# Patient Record
Sex: FEMALE | Race: WHITE
[De-identification: names, ages, dates, MRNs, and addresses within clinical notes are randomized per-mention and may not be internally consistent; named-entity substitution may affect disease eponyms.]

---

## 2018-01-29 ENCOUNTER — HOSPITAL ENCOUNTER (INPATIENT)
Dept: HOSPITAL 68 - ERH | Age: 78
LOS: 11 days | Discharge: NURSING, REHAB OR LONG-TERM CARE | DRG: 436 | End: 2018-02-09
Attending: INTERNAL MEDICINE | Admitting: INTERNAL MEDICINE
Payer: COMMERCIAL

## 2018-01-29 VITALS — SYSTOLIC BLOOD PRESSURE: 170 MMHG | DIASTOLIC BLOOD PRESSURE: 60 MMHG

## 2018-01-29 VITALS — WEIGHT: 130 LBS | HEIGHT: 59 IN | BODY MASS INDEX: 26.21 KG/M2

## 2018-01-29 VITALS — SYSTOLIC BLOOD PRESSURE: 150 MMHG | DIASTOLIC BLOOD PRESSURE: 60 MMHG

## 2018-01-29 DIAGNOSIS — I12.9: ICD-10-CM

## 2018-01-29 DIAGNOSIS — I73.9: ICD-10-CM

## 2018-01-29 DIAGNOSIS — G89.29: ICD-10-CM

## 2018-01-29 DIAGNOSIS — Z85.42: ICD-10-CM

## 2018-01-29 DIAGNOSIS — Z95.1: ICD-10-CM

## 2018-01-29 DIAGNOSIS — M47.896: ICD-10-CM

## 2018-01-29 DIAGNOSIS — K43.9: ICD-10-CM

## 2018-01-29 DIAGNOSIS — R26.89: ICD-10-CM

## 2018-01-29 DIAGNOSIS — E78.5: ICD-10-CM

## 2018-01-29 DIAGNOSIS — C78.7: Primary | ICD-10-CM

## 2018-01-29 DIAGNOSIS — M48.061: ICD-10-CM

## 2018-01-29 DIAGNOSIS — Z87.891: ICD-10-CM

## 2018-01-29 DIAGNOSIS — K59.09: ICD-10-CM

## 2018-01-29 DIAGNOSIS — N18.4: ICD-10-CM

## 2018-01-29 DIAGNOSIS — I25.10: ICD-10-CM

## 2018-01-29 DIAGNOSIS — K21.9: ICD-10-CM

## 2018-01-29 DIAGNOSIS — R18.0: ICD-10-CM

## 2018-01-29 DIAGNOSIS — Z85.038: ICD-10-CM

## 2018-01-29 DIAGNOSIS — K76.6: ICD-10-CM

## 2018-01-29 LAB
ABSOLUTE GRANULOCYTE CT: 11.6 /CUMM (ref 1.4–6.5)
APTT BLD: 29 SEC (ref 25–37)
BASOPHILS # BLD: 0 /CUMM (ref 0–0.2)
BASOPHILS NFR BLD: 0 % (ref 0–2)
EOSINOPHIL # BLD: 0 /CUMM (ref 0–0.7)
EOSINOPHIL NFR BLD: 0.1 % (ref 0–5)
ERYTHROCYTE [DISTWIDTH] IN BLOOD BY AUTOMATED COUNT: 14.9 % (ref 11.5–14.5)
GRANULOCYTES NFR BLD: 96.7 % (ref 42.2–75.2)
HCT VFR BLD CALC: 32.2 % (ref 37–47)
LYMPHOCYTES # BLD: 0.3 /CUMM (ref 1.2–3.4)
MCH RBC QN AUTO: 31 PG (ref 27–31)
MCHC RBC AUTO-ENTMCNC: 33.6 G/DL (ref 33–37)
MCV RBC AUTO: 92.2 FL (ref 81–99)
MONOCYTES # BLD: 0.1 /CUMM (ref 0.1–0.6)
PLATELET # BLD: 309 /CUMM (ref 130–400)
PMV BLD AUTO: 7.2 FL (ref 7.4–10.4)
PROTHROMBIN TIME: 10.1 SEC (ref 9.4–12.5)
RED BLOOD CELL CT: 3.5 /CUMM (ref 4.2–5.4)
WBC # BLD AUTO: 12 /CUMM (ref 4.8–10.8)

## 2018-01-29 PROCEDURE — 2NASP: CPT

## 2018-01-29 PROCEDURE — 04007: CPT

## 2018-01-29 PROCEDURE — 83520 IMMUNOASSAY QUANT NOS NONAB: CPT

## 2018-01-29 PROCEDURE — P9047 ALBUMIN (HUMAN), 25%, 50ML: HCPCS

## 2018-01-29 PROCEDURE — 87075 CULTR BACTERIA EXCEPT BLOOD: CPT

## 2018-01-29 PROCEDURE — 83516 IMMUNOASSAY NONANTIBODY: CPT

## 2018-01-29 NOTE — ULTRASOUND REPORT
EXAMINATION:
US TRIPLEX LOWER EXTREMITY, RIGHT
 
CLINICAL INFORMATION:
Right leg swelling and pain.
 
COMPARISON:
Prior bilateral venous examination October 2017
 
TECHNIQUE:
Color-flow triplex imaging with spectral analysis and compression Doppler
were performed on the lower extremity.
 
FINDINGS:
Respiratory variation, normal compression and augmented flow are noted
throughout the lower extremity. The visualized common femoral vein,
superficial femoral vein, profunda femoral vein, popliteal vein and midcalf
peroneal and posterior tibial venous segments show no evidence of deep venous
thrombosis.
 
There is no Baker's cyst.
 
IMPRESSION:
Normal triplex scan without evidence of deep venous thrombosis involving the
lower extremity.

## 2018-01-29 NOTE — ED NECK/BACK PAIN COMPLAINT
History of Present Illness
 
General
Chief Complaint: Low Back Pain/Injury
Stated Complaint: BIBA BACK PAIN
Source: patient, old records, EMS
Exam Limitations: no limitations
 
Vital Signs & Intake/Output
Vital Signs & Intake/Output
 Vital Signs
 
 
Date Time Temp Pulse Resp B/P B/P Pulse O2 O2 Flow FiO2
 
     Mean Ox Delivery Rate 
 
 1417 98.8 80 20 120/77  94   
 
 1147 97.9 62 20 170/58  97 Room Air Room Air 
 
 0918 97.7 61 18 138/68     
 
 0918 97.7 61 18 138/68     
 
 0918 97.7 61 18 138/68     
 
 0918 97.7 61 18 138/68     
 
 0918 97.7 61 18 138/68     
 
 0707 97.7 61 18 138/68  96 Room Air  
 
 2250  70  170/70     
 
 2249  80  170/70     
 
 2239 98.2 70 18 150/60  97 Room Air  
 
 2024 98.7 69 20 170/60  97 Room Air  
 
 1917 98.4 68 20 186/84  95 Room Air  
 
 1717 98.1 71 18 191/84  96 Room Air  
 
 1716       Room Air  
 
 
 ED Intake and Output
 
 
  0000  1200
 
Intake Total 420 
 
Output Total  
 
Balance 420 
 
   
 
Intake, IV 10 
 
Intake, Oral 410 
 
Number 1 
 
Bowel  
 
Movements  
 
Patient 130 lb 
 
Weight  
 
Weight Reported by Patient 
 
Measurement  
 
Method  
 
 
 
Allergies
Coded Allergies:
Sulfa (Sulfonamide Antibiotics) (Severe, HIVES 16)
 
Reconcile Medications
Amlodipine Besylate (Norvasc) 10 MG TABLET   1 TAB PO DAILY htn  (Reported)
Aspirin (Children's Aspirin) 81 MG TAB.CHEW   1 TAB PO DAILY HEART  (Reported)
Atorvastatin Calcium (Lipitor) 20 MG TABLET   1 TAB PO AT BEDTIME HIGH 
CHOLESTROL  (Reported)
Cholecalciferol (Vitamin D3) (Vitamin D3) 2,000 UNIT CAPSULE   1 CAP PO BID 
SUPPLEMENT  (Reported)
Cilostazol 50 MG TAB   1 TAB PO BID CLOTTING  (Reported)
Clonidine (Catapres) 0.2 MG TABLET   1 TAB PO BID HEART  (Reported)
Hydralazine HCl 50 MG TABLET   1 TAB PO BID htn  (Reported)
Isosorbide Mononitrate (Imdur) 60 MG TER   1 TAB PO DAILY HEART  (Reported)
Metoprolol Succinate 50 MG TAB.ER.24H   1 TAB PO DAILY htn  (Reported)
Omega-3 Fatty Acids/Fish Oil (Fish Oil 1,000 MG Capsule) 340 MG-1,000 MG CAPSULE
  2 CAP PO DAILY SUPPLEMENT  (Reported)
Omeprazole 40 MG CAPSULE.   1 CAP PO DAILY gerd
 
Triage Note:
PT BIBA FROM HOME WITH CHRONIC BACK PAIN. STATES
 WHEN SHE IS NOT MOVING OR WALKING SHE HAS NO PAIN,
 BUT WHEN SHE IS MOVING IT IS 10/10 PAIN. DENIES
 ANY CP/SOB. STATES SHE HAS BEEN HAVING DIARRHEA
 RECENTLY AND RETAINING WATER AS WELL.
Triage Nurses Notes Reviewed? yes
Onset: Gradual
Duration: week(s):, intermittent, waxing and waning
Timing: recent history
Quality/Severity: moderate
Location: lumbar spine, paraspinous muscles
Radiation: buttocks
Method of Injury: unknown
Loss of Consciousness: no loss of consciousness
Modifying Factors: movement, rest
Associated Symptoms: abdominal bloating
HPI:
77 year old female with past medical history significant for CAD status post 
CABG (), PVD (status post angioplasty with stent placement), HTN,stage IV 
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 
disc surgery, uterine cancer, colon cancer, ? cva presents to the emergency room
complaining of exacerbation of her chronic left lower back pain has been going 
on intermittently for the past few weeks however is now brought her to the point
where she is unable to ambulate at home secondary to her back pain which 
intermittently radiates into her left thigh.  She denies any recent trauma or 
fall.  Patient is been taking tramadol without improvement.  She is also 
complaining of a several year history of abdominal bloating and diarrhea for 
which she said her primary care physician wants to get a CAT scan however she is
unable to [place] to get there.  She denies any nausea vomiting walk or bloody 
stools.  No urinary urgency frequency dysuria. she denies any pain at this time
 
 
(Guera CARABALLO,Shad)
 
Past History
 
Travel History
Traveled to [place] past 21 day No
 
Medical History
Any Pertinent Medical History? see below for history
Neurological: CVA (? L CVA w/o residual)
EENT: NONE
Cardiovascular: CAD, hypertension, hyperlipidemia, PVD, CABG /PAD with LE 
stent
Respiratory: NONE
Gastrointestinal: n & v x 1 month PTA 10/22/1990:  remote sigmoid resection for 
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
Hepatic: NONE
Renal: chronic kidney disease (stage IV)
Musculoskeletal: chronic back pain, degen joint disease, falls, gout
Psychiatric: NONE
Endocrine: thyroid disease
Blood Disorders: anemia
Cancer(s): basal cell carcinoma, colon/rectal cancer, endometrial cancer
GYN/Reproductive: NONE
History of MRSA: No
History of VRE: No
History of CDIFF: No
 
Surgical History
Surgical History: hysterectomy (TAHBSO for uterine Ca, f/b RT ), PARTIAL 
COLECTOMY (SIGMOID) 10/1990  DUE TO CANCER OF COLON
 
Psychosocial History
Who do you live with Patient/Self
Services at Home None
What is your primary language English
Tobacco Use: Never used
 
Family History
Family History, If Any:
MOTHER (Unknown - pt adolted).
FATHER (unknown - pt adopted).
 
Hx Contributory? No
(Shad Wayne)
 
Review of Systems
 
Review of Systems
Constitutional:
Reports: see HPI. 
Comments
Review of systems: See HPI, All other systems negative.
Constitutional, no chills no fever, no malaise 
HEENT:  no sore throat no congestion
Cardiovascular: No chest pain 
Skin:  no rashes, no change in skin
Respiratory: No dyspnea no cough no  sputum 
GI: No nausea no vomiting, CHRONIC diarrhea
: No dysuria No hematuria, no frequency
Muscle skeletal:  back pain, no neck pain,
Neurologic: , no headache
Psych: No stress
Heme/endocrine: No bruising 
Immunology: No lymphadenopathy
(Shad Wayne)
 
Physical Exam
 
Physical Exam
General Appearance: well developed/nourished, alert, awake
Neck: supple
Comments:
Well-developed well-nourished person in no acute distress
HEENT: Normal EENT exam; PERRL, EOMI, no nystagmus. HEAD is atraumatic. moist 
mucous membranes.  
Neck: Supple, normal range of motion without pain or tenderness
Back: Nontender, midline well healed incision without overlying erythema 
induration or tenderness,  no CVA tenderness. Full range of motion
Cardiovascular: Regular rate and rhythms no murmurs rubs 
Respiratory:  No respiratory distress.  Patient speaking in full complete 
sentences. Breath sounds clear to auscultation bilaterally: NO W/R/R
Abdomen: Soft, nontender distended, no appreciable organomegaly. Normal bowel 
sounds. No rebound/guarding,   (+)  ascites.
Extremity: No edema, neg slr b/l, full range of motion of extremities, normal 
and equal pulses bilaterally, 5 out of 5 strength noted to bilateral upper and 
lower extremities
Neuro: Alert oriented x3, motor sensory normal, There were no obvious focal 
neurologic abnormalities.
Skin: No appreciable rash on exposed skin, skin is warm and dry. no jaundice
Psych: Mood and affect is normal, memory and judgment is normal.
 
Core Measures
CVA/TIA Diagnosis: No
(Guera CARABALLO,Shad)
 
Progress
Differential Diagnosis: cauda equina syn, herniated disc, myofascial strain, 
pyelo/UTI, sciatica, spinal cord inj, T/L spine injury, ureterolithiasis, 
malignancy, ascites, sbp
Plan of Care:
 Orders
 
 
Procedure Date/time Status
 
Heart Healthy Diet  B Active
 
Change service to  1455 Active
 
CULTURE,BODY FLUID  1000 Active
 
CYTOLOGY SPECIMEN  1000 Active
 
BODY FLUID TOTAL PROTEIN  1000 Complete
 
BODY FLUID LDH  1000 Complete
 
BODY FLUID CELL COUNT  1000 Complete
 
BODY FLUID GLUCOSE  1000 Complete
 
BODY FLUID AMYLASE  1000 Complete
 
BODY FLUID ALBUMIN  1000 Complete
 
TOTAL PROTEIN  0815 Complete
 
LDH (LACT ACID DEHYDROGENASE)  0815 Complete
 
ALBUMIN  0815 Complete
 
CBC WITHOUT DIFFERENTIAL  0600 Complete
 
BASIC ELECTROLYTES PLUS BUN&CR  0600 Complete
 
PT EVAL LOW COMPLEX 20 MIN   UNK Complete
 
Gait  Training   UNK Complete
 
Lab Add-on Test   UNK Active
 
Vital Signs  2020 Active
 
Teach/Educate 2020 Active
 
Pain Treatment and Response 2020 Active
 
Nutritional Intake, Monitor 2020 Active
 
Isolation  2020 Active
 
Intake & Output 2020 Active
 
Patient Care Conference  2020 Active
 
Activity/Ambulation  2020 Active
 
PT Evaluate & Treat  180 Active
 
Saline Lock  180 Active
 
Pathway - chart  1807 Active
 
House Staff  1807 Active
 
Lab Add-on Test  1807 Active
 
Code Status  1807 Active
 
Patient Data  1728 Active
 
Add-on Test (ER Only)  1708 Active
 
ED Holding Orders  1706 Active
 
Admit to inpatient  1706 Active
 
Vital Signs  1706 Active
 
Code Status  1706 Complete
 
TROPONIN LEVEL  1540 Complete
 
PARTIAL THROMBOPLASTIN TIME  1540 Complete
 
PROTHROMBIN TIME  1540 Complete
 
PHOSPHORUS  1540 Complete
 
MAGNESIUM  1540 Complete
 
GAMMA GLUTAMYL TRANSFERASE  1540 Complete
 
Lab Add-on Test   UNK Active
 
VTE Mechanical Prophylaxis   UNK Active
 
Vital Signs   UNK Complete
 
Precautions   UNK Active
 
Nursing Misc   UNK Active
 
Intake & Output   UNK Active
 
MISSING MEDICATION FORM   UNK Active
 
 
 Current Medications
 
 
  Sig/Jose E Start time  Last
 
Medication Dose  Stop Time Status Admin
 
Prednisone 10 MG DAILY  1523 AC 
 
Amlodipine Besylate 10 MG DAILY  1000 AC 
 
(Norvasc)     0918
 
Isosorbide  60 MG DAILY  1000 AC 
 
Mononitrate     0918
 
(Imdur)     
 
Metoprolol Succinate 50 MG DAILY  1000 AC 
 
(Toprol Xl)     0918
 
Atorvastatin Calcium 20 MG AT BEDTIME  2200 AC 
 
(Lipitor)     2250
 
Cilostazol 50 MG BID  2200 AC 
 
(Pletal)     0300
 
Clonidine 0.2 MG BID  2200 AC 
 
(Catapres)     0918
 
Hydralazine HCl 50 MG BID  2200 AC 
 
(Apresoline)     0918
 
Melatonin 5 MG ONCE ONE  2045 CAN 
 
(Melatonin)    204  
 
Acetaminophen 500 MG Q6P PRN  1815 AC 
 
(Tylenol)     
 
Lidocaine 1 PAT DAILY  1815 AC 
 
(Lidoderm)     
 
Hydrocodone Bitart/ 1 TAB Q8P PRN  1800 AC 
 
Acetaminophen     
 
(Vicodin)     
 
Oxycodone/ 1 TAB Q8P PRN  1800 AC 
 
Acetaminophen     
 
(Percocet)     
 
 
 Laboratory Tests
 
 
 
18 1000:
Fluid WBC 72  H, Fld Mesothelial Cells 63, Fld Total RBCs Counted 128  H
 
18 1000:
Lymphocytes 14, % Normal PMNs 23, Fluid Glucose 102, Fluid Total Protein < 2.0, 
Fluid Albumin < 1.0, Fluid , Fluid Amylase < 30
 
18 0815:
Anion Gap 9, Estimated GFR 44  L, BUN/Creatinine Ratio 30.8  H, Lactate 
Dehydrogenase 509, Total Protein 4.5  L, Albumin 2.4  L, CBC w Diff NO MAN DIFF 
REQ, RBC 2.66  L, MCV 91.0, MCH 30.8, MCHC 33.8, RDW 14.9  H, MPV 7.8, Gran % 
87.2  H, Lymphocytes % 7.4  L, Monocytes % 5.3, Eosinophils % 0.1, Basophils % 0
, Absolute Granulocytes 9.5  H, Absolute Lymphocytes 0.8  L, Absolute Monocytes 
0.6, Absolute Eosinophils 0, Absolute Basophils 0
 Microbiology
 UNK  BODY FLUID: Body Fluid Culture - RES
 UNK  BODY FLUID: Gram Stain - RES
 
labs ordered, ct ordered-PT IS DECLINING ANYTHING FOR pain, 
 
case d/w dr mckeon agrees with plan
 
 
i d/w the pt all of her labs, pt is aware of her elevated kidney function,.l id/
w however her ct findings and need for workup. pt denies abd pain, fever, 
chills. 
 
case d/wdr mann will admit
Diagnostic Imaging:
Viewed by Me: CT Scan.  Discussed w/RAD: CT Scan. 
Radiology Impression: PATIENT: BROOK QUIROZ  MEDICAL RECORD NO: [id number] PRESENT
AGE: 77  PATIENT ACCOUNT NO: [id number] : 41  LOCATION: [facility] ([facility] ORDERING 
PHYSICIAN: Shad CARABALLO     SERVICE DATE:  EXAM TYPE: CAT - CT 
ABD & PELVIS W/O IV CONTRAS EXAMINATION: CT ABDOMEN AND PELVIS WITHOUT CONTRAST 
CLINICAL INFORMATION: Ascites. Lower back pain. Swelling. COMPARISON: Ultrasound
abdomen 2018. CT abdomen and pelvis 2016. TECHNIQUE: Multidetector 
volumetric imaging was performed from the superior aspect of the liver through 
the pubic symphysis. Sagittal and coronal reformatted images were obtained on 
the technologist's workstation. DLP: 386.18 mGy-cm FINDINGS: LUNG BASES: Small 
volume left pleural effusion. Linear atelectasis at the left lung base. Status 
post median sternotomy. Vascular calcification of the coronary arteries and 
thoracic aorta. LIVER, GALLBLADDER, AND BILIARY TREE: Large area of low 
attenuation consistent with a hepatic mass involving much of the midline right 
liver. Mass extends from the herminio hepatis to the dome measuring about 8.2 x 7.6
x 8 cm. This causes lobular bulging of the margin of the liver. On the CT scan 
of 2016, there was a hypodense lesion at the dome of the liver measuring 
2.2 cm. There is no intrahepatic bile duct dilatation. The gallbladder is 
unremarkable with no evidence of radiopaque gallstones, gallbladder wall 
thickening, or obvious pericholecystic inflammatory changes. PANCREAS: Pancreas 
is atrophic. No pancreatic duct dilatation or mass. SPLEEN: Unremarkable. 
ADRENAL GLANDS: Unremarkable. KIDNEYS AND URETERS: Bilateral renal cysts. There 
is a hyperdense cyst in the cortex of the upper pole right kidney measuring 1.8 
cm. Hyperdense cyst in the mid pole of the left kidney measuring 3.8 cm. BLADDER
: Small collection of air in the bladder. No Rodriguez catheter seen. Correlate with
history of bladder instrumentation. No bladder wall thickening. GASTROINTESTINAL
TRACT: Surgical line of sutures at the proximal sigmoid colon from prior partial
colectomy. No acute change of the bowel. No bowel obstruction. No bowel wall 
dilatation. There is a large collection of stool throughout the colon from cecum
through pelvis. The appendix is not seen. MESENTERY: There is a large volume of 
abdominal ascites. Surgical clips in the mesentery adjacent to the descending 
colon left side of abdomen. ABDOMINAL WALL: There is a midline ventral hernia at
the upper abdomen just below the diaphragm at the midline. The defect in the 
anterior abdominal wall measures 4.7 x 5.7 cm. There is a large pocket of 
abdominal ascites at this hernia site extending into the subcutaneous tissue. 
The herniated pocket measures 7.9 x 3.0 x 9.4 cm and bulges the anterior skin 
line at the upper anterior abdomen. There is generalized anasarca. LYMPH NODES: 
Normal. VASCULAR: Extensive atherosclerotic vascular wall calcifications 
throughout the abdomen and pelvis. No aneurysm of the aorta. PELVIC VISCERA: 
Uterus is absent. No adnexal abnormality. OSSEOUS STRUCTURES: Degenerative 
spondylosis of the spine with multilevel endplate spurring of the thoracic 
vertebrae. Degenerative spondylosis of the facet joints at the lower lumbar 
spine. Status post left-sided laminectomy L4 vertebra. Status post median 
sternotomy. No acute osseous abnormality. No focal bone lesion. IMPRESSION: 1. 
Large volume of abdominal ascites. Generalized anasarca. Small left pleural 
effusion. 2. Suspicious large lesion in the liver. This can be further 
characterized with dynamic MRI. 3. Degenerative changes of the spine. Status 
post left-sided laminectomy L4 vertebra. DICTATED BY: Stanton De La Torre MD  DATE/TIME 
DICTATED:18 :RAD.GARCIA  DATE/TIME TRANSCRIBED:1531 CONFIDENTIAL, DO NOT COPY WITHOUT APPROPRIATE AUTHORIZATION.  <
Electronically signed in Other Vendor System>                                   
                                                    SIGNED BY: Stanton De La Torre MD  [id number]
Initial ED EKG: nsr at 70, nonspecific st seg changes, normal axis
Prior EKG: unchanged (2016)
(Shad Wayne)
 
Departure
 
Departure
Time of Disposition: 
Disposition: STILL A PATIENT
Condition: Stable
Clinical Impression
Primary Impression: Ascites
Secondary Impressions: Gait instability, Intractable back pain, Liver lesion
Referrals:
Jairon PINTO,Adalberto CARLSON (PCP/Family)
 
Departure Forms:
Customer Survey
General Discharge Information
 
Admission Note
Spoke With:
Jerson Mann MD
Documentation of Exam:
Documentation of any treatments & extenuating circumstances including Concerns 
Regarding Discharge (functional status, medication knowledge or non-compliance, 
living conditions, etc.) that warrant an admission rather than observation: [ gi
and oncology consult, iv pain control, trend labs, brook will require 
paracentesis, pt consult, case management consult as she is unable to care for 
self currently at home alone
 
(Shad Wayne)
 
PA/NP Co-Sign Statement
Statement:
ED Attending supervision documentation-
 
[x] I saw and evaluated the patient. I have also reviewed all the pertinent lab 
results and diagnostic results. I agree with the findings and the plan of care 
as documented in the PA's/NP's documentation. 
 
[] I have reviewed the ED Record and agree with the PA's/NP's documentation.
 
[] Additions or exceptions (if any) to the PAs/NP's note and plan are 
summarized below:
[]
 
(Win Mckeon DO

## 2018-01-29 NOTE — ADMISSION CERTIFICATION
Admission Certification
 
Certification Statement
- As attending physician, I certify that at the time of
- admission, based on clinical presentation, severity of
- symptoms, need for further diagnostic testing and
- therapeutic interventions, and risk of adverse outcomes
- without in-hospital treatment, in my clinical assessment,
- this patient requires an acute hospital stay for a minimum
- of two nights or longer. I have also considered psychsocial
- factors such as support system, advanced age, financial
- issues, cognitive issues, and failed out-patient treatments,
- past re-admission history, safety of patient, and lack of
- compliance as applicable.
Specific rationale supporting this admission is:
Patient presents with severe back pain requiring IV meds, unable to ambulate, 
ascites ? malignancy. Needs diagnostic and therapeutic paracentesis, 
neurosurgical and PT consults.

## 2018-01-29 NOTE — CT SCAN REPORT
EXAMINATION:
CT ABDOMEN AND PELVIS WITHOUT CONTRAST
 
CLINICAL INFORMATION:
Ascites. Lower back pain. Swelling.
 
COMPARISON:
Ultrasound abdomen 01/18/2018. CT abdomen and pelvis 09/28/2016.
 
TECHNIQUE:
Multidetector volumetric imaging was performed from the superior aspect of
the liver through the pubic symphysis. Sagittal and coronal reformatted
images were obtained on the technologist's workstation.
 
DLP:
386.18 mGy-cm
 
FINDINGS:
 
LUNG BASES: Small volume left pleural effusion. Linear atelectasis at the
left lung base. Status post median sternotomy. Vascular calcification of the
coronary arteries and thoracic aorta.
 
LIVER, GALLBLADDER, AND BILIARY TREE: Large area of low attenuation
consistent with a hepatic mass involving much of the midline right liver.
Mass extends from the herminio hepatis to the dome measuring about 8.2 x 7.6 x 8
cm. This causes lobular bulging of the margin of the liver. On the CT scan of
09/28/2016, there was a hypodense lesion at the dome of the liver measuring
2.2 cm.
There is no intrahepatic bile duct dilatation.
 
The gallbladder is unremarkable with no evidence of radiopaque gallstones,
gallbladder wall thickening, or obvious pericholecystic inflammatory changes.
 
 
PANCREAS: Pancreas is atrophic. No pancreatic duct dilatation or mass.
 
SPLEEN: Unremarkable.
 
ADRENAL GLANDS: Unremarkable.
 
KIDNEYS AND URETERS: Bilateral renal cysts. There is a hyperdense cyst in the
cortex of the upper pole right kidney measuring 1.8 cm. Hyperdense cyst in
the mid pole of the left kidney measuring 3.8 cm.
 
BLADDER: Small collection of air in the bladder. No Rodriguez catheter seen.
Correlate with history of bladder instrumentation. No bladder wall
thickening.
 
GASTROINTESTINAL TRACT: Surgical line of sutures at the proximal sigmoid
colon from prior partial colectomy. No acute change of the bowel. No bowel
obstruction. No bowel wall dilatation. There is a large collection of stool
throughout the colon from cecum through pelvis.
The appendix is not seen.
 
MESENTERY: There is a large volume of abdominal ascites. Surgical clips in
the mesentery adjacent to the descending colon left side of abdomen.
 
ABDOMINAL WALL: There is a midline ventral hernia at the upper abdomen just
below the diaphragm at the midline. The defect in the anterior abdominal wall
measures 4.7 x 5.7 cm. There is a large pocket of abdominal ascites at this
hernia site extending into the subcutaneous tissue. The herniated pocket
measures 7.9 x 3.0 x 9.4 cm and bulges the anterior skin line at the upper
anterior abdomen.
There is generalized anasarca.
 
LYMPH NODES: Normal.
 
VASCULAR: Extensive atherosclerotic vascular wall calcifications throughout
the abdomen and pelvis. No aneurysm of the aorta.
 
PELVIC VISCERA: Uterus is absent. No adnexal abnormality.
 
OSSEOUS STRUCTURES: Degenerative spondylosis of the spine with multilevel
endplate spurring of the thoracic vertebrae. Degenerative spondylosis of the
facet joints at the lower lumbar spine. Status post left-sided laminectomy L4
vertebra.
Status post median sternotomy.
No acute osseous abnormality. No focal bone lesion.
 
IMPRESSION:
1. Large volume of abdominal ascites. Generalized anasarca. Small left
pleural effusion.
2. Suspicious large lesion in the liver. This can be further characterized
with dynamic MRI.
3. Degenerative changes of the spine. Status post left-sided laminectomy L4
vertebra.

## 2018-01-30 VITALS — DIASTOLIC BLOOD PRESSURE: 77 MMHG | SYSTOLIC BLOOD PRESSURE: 120 MMHG

## 2018-01-30 VITALS — DIASTOLIC BLOOD PRESSURE: 68 MMHG | SYSTOLIC BLOOD PRESSURE: 138 MMHG

## 2018-01-30 VITALS — SYSTOLIC BLOOD PRESSURE: 170 MMHG | DIASTOLIC BLOOD PRESSURE: 58 MMHG

## 2018-01-30 VITALS — SYSTOLIC BLOOD PRESSURE: 170 MMHG | DIASTOLIC BLOOD PRESSURE: 60 MMHG

## 2018-01-30 LAB
ABSOLUTE GRANULOCYTE CT: 9.5 /CUMM (ref 1.4–6.5)
BASOPHILS # BLD: 0 /CUMM (ref 0–0.2)
BASOPHILS NFR BLD: 0 % (ref 0–2)
EOSINOPHIL # BLD: 0 /CUMM (ref 0–0.7)
EOSINOPHIL NFR BLD: 0.1 % (ref 0–5)
ERYTHROCYTE [DISTWIDTH] IN BLOOD BY AUTOMATED COUNT: 14.9 % (ref 11.5–14.5)
GRANULOCYTES NFR BLD: 87.2 % (ref 42.2–75.2)
HCT VFR BLD CALC: 24.2 % (ref 37–47)
LYMPHOCYTES # BLD: 0.8 /CUMM (ref 1.2–3.4)
MCH RBC QN AUTO: 30.8 PG (ref 27–31)
MCHC RBC AUTO-ENTMCNC: 33.8 G/DL (ref 33–37)
MCV RBC AUTO: 91 FL (ref 81–99)
MONOCYTES # BLD: 0.6 /CUMM (ref 0.1–0.6)
PLATELET # BLD: 233 /CUMM (ref 130–400)
PMV BLD AUTO: 7.8 FL (ref 7.4–10.4)
RED BLOOD CELL CT: 2.66 /CUMM (ref 4.2–5.4)
WBC # BLD AUTO: 10.9 /CUMM (ref 4.8–10.8)

## 2018-01-30 PROCEDURE — 0W9G3ZZ DRAINAGE OF PERITONEAL CAVITY, PERCUTANEOUS APPROACH: ICD-10-PCS

## 2018-01-30 NOTE — PN- HOUSESTAFF
**See Addendum**
Subjective
Follow-up For:
weakness
back pain
liver mass
Subjective:
nonradicular back pain 5/10
no abdominal pain, diarrhea, incontinence, dysuria, nausea or vomiting
 
Review of Systems
Constitutional:
Reports: see HPI. 
 
Objective
Last 24 Hrs of Vital Signs/I&O
 Vital Signs
 
 
Date Time Temp Pulse Resp B/P B/P Pulse O2 O2 Flow FiO2
 
     Mean Ox Delivery Rate 
 
 1417 98.8 80 20 120/77  94   
 
 1147 97.9 62 20 170/58  97 Room Air Room Air 
 
 0918 97.7 61 18 138/68     
 
 0918 97.7 61 18 138/68     
 
 0918 97.7 61 18 138/68     
 
 0918 97.7 61 18 138/68     
 
 0918 97.7 61 18 138/68     
 
 0707 97.7 61 18 138/68  96 Room Air  
 
 2250  70  170/70     
 
 2249  80  170/70     
 
 2239 98.2 70 18 150/60  97 Room Air  
 
 2024 98.7 69 20 170/60  97 Room Air  
 
 1917 98.4 68 20 186/84  95 Room Air  
 
 1717 98.1 71 18 191/84  96 Room Air  
 
 1716       Room Air  
 
 
 Intake & Output
 
 
  1600  0800  0000
 
Intake Total 680 110 420
 
Output Total 150  
 
Balance 530 110 420
 
    
 
Intake, Blood 200  
 
Product   
 
Intake, IV  10 10
 
Intake, Oral 480 100 410
 
Number 0  1
 
Bowel   
 
Movements   
 
Output, Urine 150  
 
Patient   58.967 kg
 
Weight   
 
Weight   Reported by Patient
 
Measurement   
 
Method   
 
 
 
 
Physical Exam
General Appearance: Alert, Oriented X3, Cooperative, No Acute Distress
Cardiovascular: Regular Rate, Normal S1, Normal S2, No Murmurs
Lungs: Clear to Auscultation, Normal Air Movement
Abdomen: Normal Bowel Sounds, Soft, firm hepatomegaly nontender with ascites
Neurological: Strength at 5/5 X4 Ext (4/5 LLE), Cranial Nerves 3-12 NL
Extremities: No Clubbing, No Cyanosis, 1+ RLE edema
Current Medications:
 Current Medications
 
 
  Sig/Jose E Start time  Last
 
Medication Dose Route Stop Time Status Admin
 
Acetaminophen 500 MG Q6P PRN  1815 AC 
 
  PO   
 
Albumin Human 50 GM ONCE ONE  1245 DC 01/30
 
  IV  1246  1419
 
Amlodipine Besylate 10 MG DAILY  1000 AC 
 
  PO   0918
 
Atorvastatin Calcium 20 MG AT BEDTIME  2200 AC 
 
  PO   2250
 
Cilostazol 50 MG BID  2200 AC 
 
  PO   0300
 
Clonidine 0.2 MG BID  2200 AC 
 
  PO   0918
 
Heparin Sodium  5,000 UNIT Q8  2200 DC 
 
(Porcine)  SC  0800  
 
Hydralazine HCl 50 MG BID  220 AC 
 
  PO   0918
 
Hydrocodone Bitart/ 1 TAB Q8P PRN  1800 AC 
 
Acetaminophen  PO   
 
Isosorbide  60 MG DAILY  1000 AC 
 
Mononitrate  PO   0918
 
Lidocaine 1 ML .STK-MED ONE  1138 DC 
 
  ID  1139  
 
Lidocaine 1 PAT DAILY  1815 AC 
 
  EXT   
 
Melatonin 5 MG ONCE ONE  2045 CAN 
 
  PO  2046  
 
Metoprolol Succinate 50 MG DAILY  1000 AC 
 
  PO   0918
 
Oxycodone/ 1 TAB Q8P PRN  1800 AC 
 
Acetaminophen  PO   
 
Prednisone 10 MG DAILY  1523 AC 
 
  PO   
 
Tramadol HCl 50 MG ONCE ONE  2230 DC 
 
  PO  2231  2255
 
 
 
 
Last 24 Hrs of Lab/Lio Results
Last 24 Hrs of Labs/Mics:
 Laboratory Tests
 
18 1000:
Fluid WBC 72  H, Fld Mesothelial Cells 63, Fld Total RBCs Counted 128  H
 
18 1000:
Lymphocytes 14, % Normal PMNs 23, Fluid Glucose 102, Fluid Total Protein < 2.0, 
Fluid Albumin < 1.0, Fluid , Fluid Amylase < 30
 
18 0815:
Anion Gap 9, Estimated GFR 44  L, BUN/Creatinine Ratio 30.8  H, Lactate 
Dehydrogenase 509, Total Protein 4.5  L, Albumin 2.4  L, CBC w Diff NO MAN DIFF 
REQ, RBC 2.66  L, MCV 91.0, MCH 30.8, MCHC 33.8, RDW 14.9  H, MPV 7.8, Gran % 
87.2  H, Lymphocytes % 7.4  L, Monocytes % 5.3, Eosinophils % 0.1, Basophils % 0
, Absolute Granulocytes 9.5  H, Absolute Lymphocytes 0.8  L, Absolute Monocytes 
0.6, Absolute Eosinophils 0, Absolute Basophils 0
 Microbiology
 UNK  BODY FLUID: Body Fluid Culture - RES
 UNK  BODY FLUID: Gram Stain - RES
 
 
 
Assessment/Plan
Assessment:
77-year-old female with past medical history of CAD status post CABG (), PVD
(status post angioplasty with stent placement), HTN,stage IV chronic kidney 
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 
uterine cancer, colon cancer, ? cva admitted with chief complaint of back pain 
for 2 weeks which is associated with lower extremity weakness and frequent 
falls.
 
Chronic back pain: nonradicular, with new onset weakness, difficulty ambulating 
and falls 
 Patient reports diarrhea with fecal incontinence but normal bladder control
 L4 laminectomy in past (Dr. Rodriguez) and PT.
 CT showing neuroforaminal narrowing (no metastases or fractures) with spinal 
stenosis. 
        Continue analgesia with Dilaudid/oxycodone/Tylenol.
        Physical therapy consulted
        Neurosurgical consult for possible cord compression
 Patient refusing MRI evaluation
        STR.
 
Ascites with liver mass:
 CT reports large mass. 
 Patient refusing MRI at this time 
 Potential malignant ascites is of concern 
 History of colon cancer in past (resected ). 
 Paracentesis today, check SAAG, SBP, culture, and cytology
 GI consultation
 
RLE edema:
 Ultrasound Doppler negative for DVT. 
 
CAD/HTN:
 Continue betablocker,nitrates and antihypertensives
 
HLD:
 Statin on hold
 Trend LFTs
 
GERD: Continue omeprazole
 
CKD stage IIIA: stable
 Avoid nephrotoxins
 
Heart healthy diet
DVT ppx-heparin 5000 units subcutaneous Q8H
Full code
Problem List:
 1. Peripheral vascular occlusive disease
 
 2. CAD (coronary artery disease)
 
 3. Hypertension
 
 4. Abdominal pain
 
 5. History of colon cancer
 
 6. History of uterine cancer
 
 7. Falls frequently
 
 8. Ascites
 
 9. Liver lesion
 
Pain Ratin
Pain Location:
lower back
Pain Goal: Pain 4 or less
Pain Plan:
prn
Tomorrow's Labs & Rationales:
cbc, bep, lfts

## 2018-01-30 NOTE — CONS- GASTROENTEROLOGY
General Information and HPI
 
Consulting Request
Date of Consult: 01/30/18
Requested By:
Jerson Mann MD
 
Reason for Consult:
1.  Ascites
Source of Information: patient
History of Present Illness:
Patient is a 77 year old female with a PMH colon cancer s/p resectin in 1990 who
presents with new onset ascites and an enlarging mass in the dome of the liver. 
She has a past medical history of CAD status post CABG (2000), PVD (status post 
angioplasty with stent placement), HTN,stage IV chronic kidney disease secondary
to hypertensive nephrosclerosis HLD, lumbar disc surgery, and uterine cancer, ? 
cva admitted with chief complaint of back pain for 2 weeks which is associated 
with lower extremity weakness and frequent falls.  She describes the pain as 
dull aching and radiating down her lower extremities especially on the left 
side.  Of note the patient has history of back injury in due to falling down and
she has history of lumbar laminectomy.  albert Perez also reports diarrhea for 2 
months of associated with fecal incontinence.  She has had no melena nor bright 
red blood per rectum.  She denies nausea, vomiting or abdominal pain.  She has 
had no fever or shaking chills.  
 
On admission she had a CT Scan the results of which are as follows:
 
 
FINDINGS:
 
LUNG BASES: Small volume left pleural effusion. Linear atelectasis at the
left lung base. Status post median sternotomy. Vascular calcification of the
coronary arteries and thoracic aorta.
 
LIVER, GALLBLADDER, AND BILIARY TREE: Large area of low attenuation
consistent with a hepatic mass involving much of the midline right liver.
Mass extends from the herminio hepatis to the dome measuring about 8.2 x 7.6 x 8
cm. This causes lobular bulging of the margin of the liver. On the CT scan of
09/28/2016, there was a hypodense lesion at the dome of the liver measuring
2.2 cm.
There is no intrahepatic bile duct dilatation.
 
The gallbladder is unremarkable with no evidence of radiopaque gallstones,
gallbladder wall thickening, or obvious pericholecystic inflammatory changes.
 
 
PANCREAS: Pancreas is atrophic. No pancreatic duct dilatation or mass.
 
SPLEEN: Unremarkable.
 
ADRENAL GLANDS: Unremarkable.
 
KIDNEYS AND URETERS: Bilateral renal cysts. There is a hyperdense cyst in the
cortex of the upper pole right kidney measuring 1.8 cm. Hyperdense cyst in
the mid pole of the left kidney measuring 3.8 cm.
 
BLADDER: Small collection of air in the bladder. No Rodriguez catheter seen.
Correlate with history of bladder instrumentation. No bladder wall
thickening.
 
GASTROINTESTINAL TRACT: Surgical line of sutures at the proximal sigmoid
colon from prior partial colectomy. No acute change of the bowel. No bowel
obstruction. No bowel wall dilatation. There is a large collection of stool
throughout the colon from cecum through pelvis.
The appendix is not seen.
 
MESENTERY: There is a large volume of abdominal ascites. Surgical clips in
the mesentery adjacent to the descending colon left side of abdomen.
 
ABDOMINAL WALL: There is a midline ventral hernia at the upper abdomen just
below the diaphragm at the midline. The defect in the anterior abdominal wall
measures 4.7 x 5.7 cm. There is a large pocket of abdominal ascites at this
hernia site extending into the subcutaneous tissue. The herniated pocket
measures 7.9 x 3.0 x 9.4 cm and bulges the anterior skin line at the upper
anterior abdomen.
There is generalized anasarca.
 
LYMPH NODES: Normal.
 
VASCULAR: Extensive atherosclerotic vascular wall calcifications throughout
the abdomen and pelvis. No aneurysm of the aorta.
 
PELVIC VISCERA: Uterus is absent. No adnexal abnormality.
 
OSSEOUS STRUCTURES: Degenerative spondylosis of the spine with multilevel
endplate spurring of the thoracic vertebrae. Degenerative spondylosis of the
facet joints at the lower lumbar spine. Status post left-sided laminectomy L4
vertebra.
Status post median sternotomy.
No acute osseous abnormality. No focal bone lesion.
 
IMPRESSION:
1. Large volume of abdominal ascites. Generalized anasarca. Small left
pleural effusion.
2. Suspicious large lesion in the liver. This can be further characterized
with dynamic MRI.
3. Degenerative changes of the spine. Status post left-sided laminectomy L4
vertebra.
 
She underwent paracentesis and had a SAAG of 1.3 which is suggestive of portal 
hypertension.  She had WBC of 72 which argues against SBP.  Cytology is still 
pending.  Ascitic protein was <2 and LDH was 173 both of which argue again 
malignancy and cardiac ascites.  She also has chronic anemia which has been 
present for several years as well as chronic hypoalbuminemia with albumins in 
the 3.0 range.  However, both her platelets and coags (PT/INR) have been normal.
 
 
Allergies/Medications
Allergies:
Coded Allergies:
Sulfa (Sulfonamide Antibiotics) (Severe, HIVES 11/20/16)
 
Home Med List:
Amlodipine Besylate (Norvasc) 10 MG TABLET   1 TAB PO DAILY htn  (Reported)
Aspirin (Children's Aspirin) 81 MG TAB.CHEW   1 TAB PO DAILY HEART  (Reported)
Atorvastatin Calcium (Lipitor) 20 MG TABLET   1 TAB PO AT BEDTIME HIGH 
CHOLESTROL  (Reported)
Cholecalciferol (Vitamin D3) (Vitamin D3) 2,000 UNIT CAPSULE   1 CAP PO BID 
SUPPLEMENT  (Reported)
Cilostazol 50 MG TAB   1 TAB PO BID CLOTTING  (Reported)
Clonidine (Catapres) 0.2 MG TABLET   1 TAB PO BID HEART  (Reported)
Hydralazine HCl 50 MG TABLET   1 TAB PO BID htn  (Reported)
Isosorbide Mononitrate (Imdur) 60 MG TER   1 TAB PO DAILY HEART  (Reported)
Metoprolol Succinate 50 MG TAB.ER.24H   1 TAB PO DAILY htn  (Reported)
Omega-3 Fatty Acids/Fish Oil (Fish Oil 1,000 MG Capsule) 340 MG-1,000 MG CAPSULE
  2 CAP PO DAILY SUPPLEMENT  (Reported)
Omeprazole 40 MG CAPSULE.   1 CAP PO DAILY gerd
 
Current Medications:
 Current Medications
 
 
  Sig/Jose E Start time  Last
 
Medication Dose Route Stop Time Status Admin
 
Acetaminophen 500 MG Q6P PRN 01/29 1815 AC 
 
  PO   
 
Albumin Human 50 GM ONCE ONE 01/30 1245 DC 01/30
 
  IV 01/30 1246  1419
 
Amlodipine Besylate 10 MG DAILY 01/30 1000 AC 01/30
 
  PO   0918
 
Atorvastatin Calcium 20 MG AT BEDTIME 01/29 2200 AC 01/29
 
  PO   2250
 
Cilostazol 50 MG BID 01/29 2200 AC 01/30
 
  PO   0300
 
Clonidine 0.2 MG BID 01/29 2200 AC 01/30
 
  PO   0918
 
Heparin Sodium  5,000 UNIT Q8 01/29 2200 DC 
 
(Porcine)  SC 01/30 0800  
 
Hydralazine HCl 50 MG BID 01/29 2200 AC 01/30
 
  PO   0918
 
Hydrocodone Bitart/ 1 TAB Q8P PRN 01/29 1800 AC 
 
Acetaminophen  PO   
 
Isosorbide  60 MG DAILY 01/30 1000 AC 01/30
 
Mononitrate  PO   0918
 
Lidocaine 1 ML .STK-MED ONE 01/30 1138 DC 
 
  ID 01/30 1139  
 
Lidocaine 1 PAT DAILY 01/29 1815 AC 
 
  EXT   
 
Melatonin 5 MG ONCE ONE 01/29 2045 CAN 
 
  PO 01/29 2046  
 
Metoprolol Succinate 50 MG DAILY 01/30 1000 AC 01/30
 
  PO   0918
 
Oxycodone/ 1 TAB Q8P PRN 01/29 1800 AC 
 
Acetaminophen  PO   
 
Prednisone 10 MG DAILY 01/30 1523 UNVr 
 
  PO   
 
Tramadol HCl 50 MG ONCE ONE 01/29 2230 DC 01/29
 
  PO 01/29 2231  2255
 
 
 
 
Past History
 
Travel History
Traveled to [place] past 21 day No
 
Medical History
Blood Transfusion Hx: Yes
Neurological: CVA (? L CVA w/o residual)
EENT: NONE
Cardiovascular: CAD, hypertension, hyperlipidemia, PVD, CABG 2000/PAD with LE 
stent
Respiratory: NONE
Gastrointestinal: n & v x 1 month PTA 10/22/1990:  remote sigmoid resection for 
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
Hepatic: NONE
Renal: chronic kidney disease (stage IV)
Musculoskeletal: chronic back pain, degen joint disease, falls, gout
Psychiatric: NONE
Endocrine: thyroid disease
Blood Disorders: anemia
Cancer(s): basal cell carcinoma, colon/rectal cancer, endometrial cancer
GYN/Reproductive: NONE
 
Surgical History
Surgical History: hysterectomy (TAHBSO for uterine Ca, f/b RT ), PARTIAL 
COLECTOMY (SIGMOID) 10/1990  DUE TO CANCER OF COLON
 
Family History
Relations & Conditions If Any:
MOTHER (Unknown - pt adolted).
FATHER (unknown - pt adopted).
 
 
Psychosocial History
Who Do You Live With? self
Services at Home: None
Primary Language: English
Smoking Status: Former Smoker
Living Will? yes
Power of /HCP? yes
Name of POA/HCP: Clare Alston- friend/POA
 
Functional Ability
ADLs
Independent: dressing, eating, toileting, bathing. 
Ambulation: cane
IADLs
Independent: shopping, housework, finances, food prep, telephone, transportation
, medication admin. 
 
Review of Systems
 
Review of Systems
Constitutional:
Reports: malaise, weakness. 
EENTM:
Denies: no symptoms. 
Cardiovascular:
Denies: no symptoms. 
Respiratory:
Denies: no symptoms. 
GI:
Reports: see HPI, distention. 
Genitourinary:
Denies: no symptoms. 
Musculoskeletal:
Reports: see HPI, back pain. 
Skin:
Denies: no symptoms. 
Neurological/Psychological:
Denies: no symptoms. 
Hematologic/Endocrine:
Denies: no symptoms. 
Immunologic/Allergic:
Denies: no symptoms. 
 
Exam & Diagnostic Data
Vital Signs and I&O
Vital Signs
 
 
Date Time Temp Pulse Resp B/P B/P Pulse O2 O2 Flow FiO2
 
     Mean Ox Delivery Rate 
 
01/30 1417 98.8 80 20 120/77  94   
 
01/30 1147 97.9 62 20 170/58  97 Room Air Room Air 
 
01/30 0918 97.7 61 18 138/68     
 
01/30 0918 97.7 61 18 138/68     
 
01/30 0918 97.7 61 18 138/68     
 
01/30 0918 97.7 61 18 138/68     
 
01/30 0918 97.7 61 18 138/68     
 
01/30 0707 97.7 61 18 138/68  96 Room Air  
 
01/29 2250  70  170/70     
 
01/29 2249  80  170/70     
 
01/29 2239 98.2 70 18 150/60  97 Room Air  
 
01/29 2024 98.7 69 20 170/60  97 Room Air  
 
01/29 1917 98.4 68 20 186/84  95 Room Air  
 
01/29 1717 98.1 71 18 191/84  96 Room Air  
 
01/29 1716       Room Air  
 
 
 Intake & Output
 
 
 01/30 1600 01/30 0400 01/29 1600 01/29 0400 01/28 1600 01/28 0400
 
Intake Total 790 420    
 
Output Total 150     
 
Balance 640 420    
 
       
 
Intake, Blood 200     
 
Product      
 
Intake, IV 10 10    
 
Intake, Oral 580 410    
 
Number 0 1    
 
Bowel      
 
Movements      
 
Output, Urine 150     
 
Patient  130 lb    
 
Weight      
 
Weight  Reported by Patient    
 
Measurement      
 
Method      
 
 
 
 
Physical Exam
General Appearance: alert, awake, mild distress
Head: atraumatic, normal appearance
Neck: supple, full range of motion
Respiratory: normal breath sounds, lungs clear
Cardiovascular: regular rate/rhythm, Normal S1 and S2 without rub, murmur or 
gallop
Gastrointestinal: normal bowel sounds, soft, non-tender, no organomegaly
Extremities: edema, 2+, RLE
Neurologic/Psych: awake, alert, oriented x 3
Cranial Nerves: grossly intact
 
Results
Pertinent Lab Results:
 Laboratory Tests
 
 
 01/30 01/30 01/30
 
 UNK UNK 0815
 
Chemistry   
 
  Sodium (137 - 145 mmol/L)   138
 
  Potassium (3.5 - 5.1 mmol/L)   4.4
 
  Chloride (98 - 107 mmol/L)   106
 
  Carbon Dioxide (22 - 30 mmol/L)   23
 
  Anion Gap (5 - 16)   9
 
  BUN (7 - 17 mg/dL)   37  H
 
  Creatinine (0.5 - 1.0 mg/dL)   1.2  H
 
  Estimated GFR (>60 ml/min)   44  L
 
  BUN/Creatinine Ratio (7 - 25 %)   30.8  H
 
  Lactate Dehydrogenase (313 - 618 U/L)   509
 
  Total Protein (6.3 - 8.2 g/dL)   4.5  L
 
  Albumin (3.5 - 5.0 g/dL)   2.4  L
 
Hematology   
 
  CBC w Diff   NO MAN DIFF REQ
 
  WBC (4.8 - 10.8 /CUMM)   10.9  H
 
  RBC (4.20 - 5.40 /CUMM)   2.66  L
 
  Hgb (12.0 - 16.0 G/DL)   8.2  L
 
  Hct (37 - 47 %)   24.2  L
 
  MCV (81.0 - 99.0 FL)   91.0
 
  MCH (27.0 - 31.0 PG)   30.8
 
  MCHC (33.0 - 37.0 G/DL)   33.8
 
  RDW (11.5 - 14.5 %)   14.9  H
 
  Plt Count (130 - 400 /CUMM)   233
 
  MPV (7.4 - 10.4 FL)   7.8
 
  Gran % (42.2 - 75.2 %)   87.2  H
 
  Lymphocytes % (20.5 - 51.1 %)   7.4  L
 
  Monocytes % (1.7 - 9.3 %)   5.3
 
  Eosinophils % (0 - 5 %)   0.1
 
  Basophils % (0.0 - 2.0 %)   0
 
  Absolute Granulocytes (1.4 - 6.5 /CUMM)   9.5  H
 
  Absolute Lymphocytes (1.2 - 3.4 /CUMM)   0.8  L
 
  Lymphocytes (%)  14 
 
  Absolute Monocytes (0.10 - 0.60 /CUMM)   0.6
 
  Absolute Eosinophils (0.0 - 0.7 /CUMM)   0
 
  Absolute Basophils (0.0 - 0.2 /CUMM)   0
 
  % Normal PMNs (%)  23 
 
Other Body Source   
 
  Fluid WBC (0 - 5 /CUMM) 72  H  
 
  Fld Mesothelial Cells (%) 63  
 
  Fld Total RBCs Counted (0 /CUMM) 128  H  
 
  Fluid Glucose (mg/dL)  102 
 
  Fluid Total Protein (g/dL)  < 2.0 
 
  Fluid Albumin (g/dL)  < 1.0 
 
  Fluid LDH (U/L)  173 
 
  Fluid Amylase (U/L)  < 30 
 
 
 
 
 01/29
 
 1540
 
Chemistry 
 
  Sodium (137 - 145 mmol/L) 141
 
  Potassium (3.5 - 5.1 mmol/L) 4.9
 
  Chloride (98 - 107 mmol/L) 105
 
  Carbon Dioxide (22 - 30 mmol/L) 23
 
  Anion Gap (5 - 16) 13
 
  BUN (7 - 17 mg/dL) 37  H
 
  Creatinine (0.5 - 1.0 mg/dL) 1.3  H
 
  Estimated GFR (>60 ml/min) 40  L
 
  BUN/Creatinine Ratio (7 - 25 %) 28.5  H
 
  Glucose (65 - 99 mg/dL) 132  H
 
  Calcium (8.4 - 10.2 mg/dL) 10.0
 
  Phosphorus (2.5 - 4.5 mg/dL) 3.0
 
  Magnesium (1.6 - 2.3 mg/dL) 1.9
 
  Total Bilirubin (0.2 - 1.3 mg/dL) 0.6
 
  GGT (12 - 43 U/L) 159  H
 
  AST (14 - 36 U/L) 25
 
  ALT (9 - 52 U/L) 35
 
  Alkaline Phosphatase (<127 U/L) 214  H
 
  Troponin I (< 0.11 ng/ml) 0.02
 
  Total Protein (6.3 - 8.2 g/dL) 5.8  L
 
  Albumin (3.5 - 5.0 g/dL) 3.3  L
 
  Globulin (1.9 - 4.2 gm/dL) 2.5
 
  Albumin/Globulin Ratio (1.1 - 2.2 %) 1.3
 
Coagulation 
 
  PT (9.4 - 12.5 SEC) 10.1
 
  INR (0.90 - 1.19) 0.96
 
  APTT (25 - 37 SEC) 29
 
Hematology 
 
  CBC w Diff NO MAN DIFF REQ
 
  WBC (4.8 - 10.8 /CUMM) 12.0  H
 
  RBC (4.20 - 5.40 /CUMM) 3.50  L
 
  Hgb (12.0 - 16.0 G/DL) 10.8  L
 
  Hct (37 - 47 %) 32.2  L
 
  MCV (81.0 - 99.0 FL) 92.2
 
  MCH (27.0 - 31.0 PG) 31.0
 
  MCHC (33.0 - 37.0 G/DL) 33.6
 
  RDW (11.5 - 14.5 %) 14.9  H
 
  Plt Count (130 - 400 /CUMM) 309
 
  MPV (7.4 - 10.4 FL) 7.2  L
 
  Gran % (42.2 - 75.2 %) 96.7  H
 
  Lymphocytes % (20.5 - 51.1 %) 2.3  L
 
  Monocytes % (1.7 - 9.3 %) 0.9  L
 
  Eosinophils % (0 - 5 %) 0.1
 
  Basophils % (0.0 - 2.0 %) 0
 
  Absolute Granulocytes (1.4 - 6.5 /CUMM) 11.6  H
 
  Absolute Lymphocytes (1.2 - 3.4 /CUMM) 0.3  L
 
  Absolute Monocytes (0.10 - 0.60 /CUMM) 0.1
 
  Absolute Eosinophils (0.0 - 0.7 /CUMM) 0
 
  Absolute Basophils (0.0 - 0.2 /CUMM) 0
 
 
 
 
Assessment/Plan
Assessment/Recommendations:
ASSESSMENT:
1.  New onset ascites.  Question etiology.  SAG suggests portal hypertension.  
Given patient's history of coronary artery disease as well as peripheral 
vascular disease may have underlying metabolic syndrome and steatohepatitis/
cirrhosis related to NAFLD.  Cannot rule out malignant ascites but this appears 
less likely given low protein and LDH will need to await cytology.
2.  Lesion in right lobe of the liver question primary hepatocellular carcinoma 
versus metastatic disease given history of endometrial cancer as well as colon 
cancer.
3.  Back pain
4.  Chronic anemia of long duration
5.  Chronic constipation.  CT scan of the abdomen and pelvis shows large stool 
burden
 
RECOMMENDATIONS:
1.  Would check alpha-fetoprotein as well as CEA 
2.  Await cytology from ascites
3.  Improve bowel habit would give MiraLAX on a twice a day basis
4.  Would check iron studies, B12 and folate given anemia
6.  Would obtain triple phase MRI as hepatocellular carcinoma has a particular 
bright appearance which washes out and which can be elicited with a with and 
without contrast study.
7.  Would check AMA and ASMA as patient may have burned out autoimmune hepatitis
 
 
Consult Acknowledgment
- Thank you for your consult request.

## 2018-01-30 NOTE — ULTRASOUND REPORT
EXAMINATION:
PARACENTESIS
 
CLINICAL INFORMATION:
Ascites
 
COMPARISON:
CT abdomen pelvis 01/29/2018
 
TECHNIQUE:
Indirect ultrasound guidance using a 6 [place] Safe-T-Centesis closed
needle/catheter system
 
FINDINGS:
Informed consent was obtained from the patient prior to the procedure. During
this process, the procedure alternatives were explained, along with the
intended outcome and benefits. The risks of the procedure, as well as the
risk of not doing the procedure, was discussed. The patient was given the
opportunity to ask questions regarding the procedure and appeared competent
to make medical decisions. A signed consent form which documents this
discussion was placed in the medical record.
 
Ultrasound evaluation of the abdomen for ascites was performed. Moderate
amount of ascites is noted in the left lower quadrant. The site was marked. A
timeout procedure was performed. The area was prepped and draped in usual
sterile fashion.
 
Using standard interventional and sterile techniques, lidocaine was used to
anesthetize the region.  A 6 [place] Safe-T-Centesis closed needle/catheter
system was introduced into the left lower quadrant using standard safety
needle technique. Approximately 6.4 L of yellow fluid was removed into the
Vacutainer bottles. The catheter was then removed. Good hemostasis was
achieved.
 
The patient demonstrated immediate symptomatic relief. The patient tolerated
the procedure well. A sterile dressing was placed. The patient was discharged
from the department in stable condition.
 
COMPLICATIONS:
None.
 
IMPRESSION:
Successful ultrasound-guided paracentesis yielding 6.4 L of fluid.

## 2018-01-31 VITALS — DIASTOLIC BLOOD PRESSURE: 68 MMHG | SYSTOLIC BLOOD PRESSURE: 140 MMHG

## 2018-01-31 VITALS — SYSTOLIC BLOOD PRESSURE: 130 MMHG | DIASTOLIC BLOOD PRESSURE: 60 MMHG

## 2018-01-31 VITALS — SYSTOLIC BLOOD PRESSURE: 168 MMHG | DIASTOLIC BLOOD PRESSURE: 70 MMHG

## 2018-01-31 VITALS — DIASTOLIC BLOOD PRESSURE: 60 MMHG | SYSTOLIC BLOOD PRESSURE: 180 MMHG

## 2018-01-31 LAB
ABSOLUTE GRANULOCYTE CT: 11.5 /CUMM (ref 1.4–6.5)
BASOPHILS # BLD: 0 /CUMM (ref 0–0.2)
BASOPHILS NFR BLD: 0.1 % (ref 0–2)
EOSINOPHIL # BLD: 0 /CUMM (ref 0–0.7)
EOSINOPHIL NFR BLD: 0 % (ref 0–5)
ERYTHROCYTE [DISTWIDTH] IN BLOOD BY AUTOMATED COUNT: 15 % (ref 11.5–14.5)
GRANULOCYTES NFR BLD: 94 % (ref 42.2–75.2)
HCT VFR BLD CALC: 26.3 % (ref 37–47)
LYMPHOCYTES # BLD: 0.5 /CUMM (ref 1.2–3.4)
MCH RBC QN AUTO: 30.6 PG (ref 27–31)
MCHC RBC AUTO-ENTMCNC: 33.3 G/DL (ref 33–37)
MCV RBC AUTO: 92 FL (ref 81–99)
MONOCYTES # BLD: 0.2 /CUMM (ref 0.1–0.6)
PLATELET # BLD: 213 /CUMM (ref 130–400)
PMV BLD AUTO: 8.1 FL (ref 7.4–10.4)
RED BLOOD CELL CT: 2.86 /CUMM (ref 4.2–5.4)
WBC # BLD AUTO: 12.2 /CUMM (ref 4.8–10.8)

## 2018-01-31 NOTE — PN- HOUSESTAFF
Alla PINTO,Jamal 01/31/18 0710:
Subjective
Follow-up For:
Weakness and chronic back pain with difficulty ambulating
liver mass and ascites
Subjective:
patient had no complaints today
no complaints of abdominal pain
 
Review of Systems
Constitutional:
Reports: see HPI. 
 
Objective
Last 24 Hrs of Vital Signs/I&O
 Vital Signs
 
 
Date Time Temp Pulse Resp B/P B/P Pulse O2 O2 Flow FiO2
 
     Mean Ox Delivery Rate 
 
01/31 1422 97.2 90 19 140/68  94   
 
01/31 1224  76  168/70     
 
01/31 1128       Room Air Room Air 
 
01/31 0858  68  180/60     
 
01/31 0615 98.0 58 18 180/60  97 Room Air  
 
01/31 0607   20 180/64     
 
01/30 2228 99.1 74 20 170/60  95   
 
01/30 2126    170/86     
 
01/30 2126    170/86     
 
 
 Intake & Output
 
 
 01/31 1600 01/31 0800 01/31 0000
 
Intake Total 560  360
 
Output Total 250  
 
Balance 310  360
 
    
 
Intake, Oral 560  360
 
Output, Urine 250  
 
 
 
 
Physical Exam
General Appearance: Alert, Oriented X3, Cooperative, No Acute Distress
Cardiovascular: Regular Rate, Normal S1, Normal S2, No Murmurs
Lungs: Clear to Auscultation, Normal Air Movement
Abdomen: Normal Bowel Sounds, Soft, No Tenderness, No Masses, +hepatomegaly
Extremities: No Clubbing, No Cyanosis, Normal Pulses, trace RLE edema
Current Medications:
 Current Medications
 
 
  Sig/Jose E Start time  Last
 
Medication Dose Route Stop Time Status Admin
 
Acetaminophen 650 MG .STK-MED ONE 01/30 2129 DC 
 
  PO 01/30 2130  
 
Acetaminophen 500 MG Q6P PRN 01/29 1815 AC 01/30
 
  PO   2129
 
Amlodipine Besylate 10 MG DAILY 01/30 1000 AC 01/31
 
  PO   0607
 
Atorvastatin Calcium 20 MG AT BEDTIME 01/29 2200 AC 01/30
 
  PO   2126
 
Cilostazol 50 MG BID 01/29 2200 AC 01/31
 
  PO   0859
 
Clonidine 0.2 MG BID 01/29 2200 AC 01/31
 
  PO   0858
 
Hydralazine HCl 50 MG TID 01/31 1000 AC 01/31
 
  PO   0858
 
Hydralazine HCl 50 MG BID 01/29 2200 DC 01/30
 
  PO   2126
 
Hydrocodone Bitart/ 1 TAB Q8P PRN 01/29 1800 AC 
 
Acetaminophen  PO   
 
Isosorbide  60 MG DAILY 01/30 1000 AC 01/31
 
Mononitrate  PO   0858
 
Lidocaine 1 PAT DAILY 01/29 1815 AC 
 
  EXT   
 
Metoprolol Succinate 50 MG DAILY 01/30 1000 AC 01/31
 
  PO   0858
 
Oxycodone/ 1 TAB Q8P PRN 01/29 1800 AC 
 
Acetaminophen  PO   
 
Polyethylene Glycol 17 GM BID 01/31 1000 AC 01/31
 
  PO   0859
 
Prednisone 10 MG DAILY 01/30 1523 AC 01/31
 
  PO   0859
 
 
 
 
Last 24 Hrs of Lab/Lio Results
Last 24 Hrs of Labs/Mics:
 Laboratory Tests
 
01/31/18 0748:
Anion Gap 9, Estimated GFR 40  L, BUN/Creatinine Ratio 27.7  H, Iron 26  L, TIBC
153  L, Ferritin 151.0, Total Bilirubin 0.7, Direct Bilirubin 0.3, AST 32, ALT 
35, Alkaline Phosphatase 212  H, Total Protein 4.5  L, Albumin 2.6  L, Vitamin 
B12 796, Folate 4.2, CBC w Diff NO MAN DIFF REQ, RBC 2.86  L, MCV 92.0, MCH 30.6
, MCHC 33.3, RDW 15.0  H, MPV 8.1, Gran % 94.0  H, Lymphocytes % 4.4  L, 
Monocytes % 1.5  L, Eosinophils % 0, Basophils % 0.1, Absolute Granulocytes 11.5
 H, Absolute Lymphocytes 0.5  L, Absolute Monocytes 0.2, Absolute Eosinophils 0,
Absolute Basophils 0
 
 
Assessment/Plan
Assessment:
77-year-old female with past medical history of CAD status post CABG (2000), PVD
(status post angioplasty with stent placement), HTN,stage IV chronic kidney 
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 
uterine cancer, colon cancer was admitted acute on chronic back pain, associated
with lower extremity weakness, gait instability, and frequent falls.  The 
patient was found to have hepatomegaly and ascites on admission and underwent 
diagnostic and therapeutic paracentesis on this hospitalization.
 
Acute on chronic back pain: nonradicular, with new weakness, difficulty 
ambulating and falls 
 History of L4 laminectomy in past (Dr. Rodriguez) and PT.
 History of neuroforaminal narrowing with possible spinal stenosis. 
 Continue analgesia with Dilaudid/oxycodone/Tylenol.
 Physical therapy consulted, patient will likely need STR placement.
 Patient refusing MRI evaluation
       
Ascites with liver mass:
 CT reports large mass 
 Patient refusing MRI at this time 
 Potential malignant ascites is of concern 
 History of colon cancer in past (resected 1990). 
 Paracentesis today, high SAAG suggests portal hypertension, can not rule out 
malignant ascites
 GI consulted, appreciate recommendations
 Deferring work up to outpatient because patient refusing MRI because of 
claustrophobia and risk of gadolinium administration with renal insufficiency 
for recommended triple phase MRI
 
RLE edema:
 Ultrasound Doppler negative for DVT. 
 
CAD/HTN:
 Continue betablocker, nitrates and antihypertensives
 Patient was hypertensive today, hydralazine frequency increased to TID
 
HLD:
 Statin on hold
 Trend LFTs
 
GERD: Continue omeprazole
 
CKD stage IIIA: stable
 Avoid nephrotoxins
 
Heart healthy diet
DVT ppx-heparin 5000 units subcutaneous Q8H
Full code
Problem List:
 1. Peripheral vascular occlusive disease
 
 2. CAD (coronary artery disease)
 
 3. Hypertension
 
 4. History of uterine cancer
 
 5. Unsteady gait
 
 6. Ascites
 
 7. Liver lesion
 
 8. Intractable back pain
 
Pain Rating: 3
Pain Location:
lumbar 
Pain Goal: Pain 4 or less
Pain Plan:
prn
Tomorrow's Labs & Rationales:
none
 
 
James Camp MD 01/31/18 1220:
Attending MD Review Statement
 
Attending Statement
Attending MD Statement: examined this patient, discuss w/resident/PA/NP, agreed 
w/resident/PA/NP, reviewed EMR data (avail)
Attending Assessment/Plan:
Doing well today.  Back pain is improved, no neurological deficit, still weak 
and unsteady.  Paracentesis shows no signs of SBP, consistent with acute liver 
injury or cirrhosis.  Cytology sent.  Patient requires an MRI for workup of 
liver mass (cannot obtain triple-phase CT due to CKD), and this can be done as 
an outpatient.  Can be discharged to [facility] if bed is available, with outpatient 
liver MRI and GI follow up.

## 2018-01-31 NOTE — PN- GASTROENTEROLOGY
Assessment/Plan
Assessment/Recommendations:
ASSESSMENT:
1.  New onset ascites.  Await cytology.
2.  Lesion in right lobe of the liver question primary hepatocellular carcinoma 
versus metastatic disease given history of endometrial cancer as well as colon 
cancer.
3.  Back pain
4.  Chronic anemia of long duration
5.  Chronic constipation.  CT scan of the abdomen and pelvis shows large stool 
burden
 
RECOMMENDATIONS:
1.  Would check alpha-fetoprotein as well as CEA 
2.  Await cytology from ascites
3.  Improve bowel habit would give MiraLAX on a twice a day basis as CT abdomen 
showed increased stool burden
4.  Would check iron studies, B12 and folate given anemia
6.  Would obtain triple phase MRI as hepatocellular carcinoma has a particular 
bright appearance (i.e. hypervascular on the arterial phase of imaging) and 
display portal vein/delayed phase wash-out (or pseudocapsule enhancement).  
These findings can only be elicited with a with and without contrast study.  
Patient unable to tolerate closed MRI and awaiting study at open MRI.
7.  Would check AMA and ASMA as patient may have burned out autoimmune hepatitis
to account for portal hypertension. 
8.  GI will see again on Friday.  Awaiting results of above studies. 
 
 
Subjective
Subjective:
Patient somewhat fatigued.  No nausea, vomiting or abdominal pain.  Unable to 
tolerate close MRI due to claustrophobia.  No shortness of breath.  
 
Objective
Vital Signs and I&Os
Vital Signs
 
 
Date Time Temp Pulse Resp B/P B/P Pulse O2 O2 Flow FiO2
 
     Mean Ox Delivery Rate 
 
01/31 0858  68  180/60     
 
01/31 0615 98.0 58 18 180/60  97 Room Air  
 
01/31 0607   20 180/64     
 
01/30 2228 99.1 74 20 170/60  95   
 
01/30 2126    170/86     
 
01/30 2126    170/86     
 
01/30 1417 98.8 80 20 120/77  94   
 
01/30 1147 97.9 62 20 170/58  97 Room Air Room Air 
 
 
 Intake & Output
 
 
 01/31 1600 01/31 0400 01/30 1600 01/30 0400 01/29 1600 01/29 0400
 
Intake Total  360 790 420  
 
Output Total   150   
 
Balance  360 640 420  
 
       
 
Intake, Blood   200   
 
Product      
 
Intake, IV   10 10  
 
Intake, Oral  360 580 410  
 
Number   0 1  
 
Bowel      
 
Movements      
 
Output, Urine   150   
 
Patient    130 lb  
 
Weight      
 
Weight    Reported by Patient  
 
Measurement      
 
Method      
 
 
 
 
Physical Exam
General Appearance: alert, awake, comfortable
Respiratory: lungs clear
Cardiovascular: regular rate/rhythm, Normal S1 and S2, without rub, murmur or 
gallop
Abdomen: soft, non-tender, no organomegaly
Neurologic/Psychiatric: alert, oriented x 3, normal mood/affect
Current Medications:
 Current Medications
 
 
  Sig/Jose E Start time  Last
 
Medication Dose Route Stop Time Status Admin
 
Acetaminophen 650 MG .STK-MED ONE 01/30 2129 DC 
 
  PO 01/30 2130  
 
Acetaminophen 500 MG Q6P PRN 01/29 1815 AC 01/30
 
  PO   2129
 
Albumin Human 50 GM ONCE ONE 01/30 1245 DC 01/30
 
  IV 01/30 1246  1419
 
Amlodipine Besylate 10 MG DAILY 01/30 1000 AC 01/31
 
  PO   0607
 
Atorvastatin Calcium 20 MG AT BEDTIME 01/29 2200 AC 01/30
 
  PO   2126
 
Cilostazol 50 MG BID 01/29 2200 AC 01/31
 
  PO   0859
 
Clonidine 0.2 MG BID 01/29 2200 AC 01/31
 
  PO   0858
 
Hydralazine HCl 50 MG TID 01/31 1000 AC 01/31
 
  PO   0858
 
Hydralazine HCl 50 MG BID 01/29 2200 DC 01/30
 
  PO   2126
 
Hydrocodone Bitart/ 1 TAB Q8P PRN 01/29 1800 AC 
 
Acetaminophen  PO   
 
Isosorbide  60 MG DAILY 01/30 1000 AC 01/31
 
Mononitrate  PO   0858
 
Lidocaine 1 ML .STK-MED ONE 01/30 1138 DC 
 
  ID 01/30 1139  
 
Lidocaine 1 PAT DAILY 01/29 1815 AC 
 
  EXT   
 
Metoprolol Succinate 50 MG DAILY 01/30 1000 AC 01/31
 
  PO   0858
 
Oxycodone/ 1 TAB Q8P PRN 01/29 1800 AC 
 
Acetaminophen  PO   
 
Polyethylene Glycol 17 GM BID 01/31 1000 AC 01/31
 
  PO   0859
 
Prednisone 10 MG DAILY 01/30 1523 AC 01/31
 
  PO   0859
 
 
 
 
Results
Pertinent Lab Results:
 Laboratory Tests
 
 
 01/31 01/30 01/30 01/30
 
 0748 UNK UNK 0815
 
Chemistry    
 
  Sodium (137 - 145 mmol/L) 138   138
 
  Potassium (3.5 - 5.1 mmol/L) 4.5   4.4
 
  Chloride (98 - 107 mmol/L) 104   106
 
  Carbon Dioxide (22 - 30 mmol/L) 25   23
 
  Anion Gap (5 - 16) 9   9
 
  BUN (7 - 17 mg/dL) 36  H   37  H
 
  Creatinine (0.5 - 1.0 mg/dL) 1.3  H   1.2  H
 
  Estimated GFR (>60 ml/min) 40  L   44  L
 
  BUN/Creatinine Ratio (7 - 25 %) 27.7  H   30.8  H
 
  Iron (37 - 170 ug/dL) 26  L   
 
  TIBC (265 - 497 ug/dL) 153  L   
 
  Ferritin (11.1 - 264 ng/mL) Pending   
 
  Total Bilirubin (0.2 - 1.3 mg/dL) 0.7   
 
  Direct Bilirubin (< 0.4 mg/dL) 0.3   
 
  AST (14 - 36 U/L) 32   
 
  ALT (9 - 52 U/L) 35   
 
  Alkaline Phosphatase (<127 U/L) 212  H   
 
  Lactate Dehydrogenase (313 - 618 U/L)    509
 
  Total Protein (6.3 - 8.2 g/dL) 4.5  L   4.5  L
 
  Albumin (3.5 - 5.0 g/dL) 2.6  L   2.4  L
 
  Vitamin B12 (239 - 931 pg/mL) Pending   
 
  Folate (2.76 - 20.0 ng/mL) Pending   
 
Hematology    
 
  CBC w Diff Pending   NO MAN DIFF REQ
 
  WBC (4.8 - 10.8 /CUMM) Pending   10.9  H
 
  RBC (4.20 - 5.40 /CUMM) Pending   2.66  L
 
  Hgb (12.0 - 16.0 G/DL) Pending   8.2  L
 
  Hct (37 - 47 %) Pending   24.2  L
 
  MCV (81.0 - 99.0 FL) Pending   91.0
 
  MCH (27.0 - 31.0 PG) Pending   30.8
 
  MCHC (33.0 - 37.0 G/DL) Pending   33.8
 
  RDW (11.5 - 14.5 %) Pending   14.9  H
 
  Plt Count (130 - 400 /CUMM) Pending   233
 
  MPV (7.4 - 10.4 FL) Pending   7.8
 
  Gran % (42.2 - 75.2 %) Pending   87.2  H
 
  Lymphocytes % (20.5 - 51.1 %) Pending   7.4  L
 
  Monocytes % (1.7 - 9.3 %) Pending   5.3
 
  Eosinophils % (0 - 5 %) Pending   0.1
 
  Basophils % (0.0 - 2.0 %) Pending   0
 
  Absolute Granulocytes (1.4 - 6.5 /CUMM) Pending   9.5  H
 
  Absolute Lymphocytes (1.2 - 3.4 /CUMM) Pending   0.8  L
 
  Lymphocytes (%)   14 
 
  Absolute Monocytes (0.10 - 0.60 /CUMM) Pending   0.6
 
  Absolute Eosinophils (0.0 - 0.7 /CUMM) Pending   0
 
  Absolute Basophils (0.0 - 0.2 /CUMM) Pending   0
 
  % Normal PMNs (%)   23 
 
Other Body Source    
 
  Fluid WBC (0 - 5 /CUMM)  72  H  
 
  Fld Mesothelial Cells (%)  63  
 
  Fld Total RBCs Counted (0 /CUMM)  128  H  
 
  Fluid Glucose (mg/dL)   102 
 
  Fluid Total Protein (g/dL)   < 2.0 
 
  Fluid Albumin (g/dL)   < 1.0 
 
  Fluid LDH (U/L)   173 
 
  Fluid Amylase (U/L)   < 30 
 
 
 
 
 01/29 01/29
 
 1540 1506
 
Chemistry  
 
  Sodium (137 - 145 mmol/L) 141 
 
  Potassium (3.5 - 5.1 mmol/L) 4.9 
 
  Chloride (98 - 107 mmol/L) 105 
 
  Carbon Dioxide (22 - 30 mmol/L) 23 
 
  Anion Gap (5 - 16) 13 
 
  BUN (7 - 17 mg/dL) 37  H 
 
  Creatinine (0.5 - 1.0 mg/dL) 1.3  H 
 
  Estimated GFR (>60 ml/min) 40  L 
 
  BUN/Creatinine Ratio (7 - 25 %) 28.5  H 
 
  Glucose (65 - 99 mg/dL) 132  H 
 
  Calcium (8.4 - 10.2 mg/dL) 10.0 
 
  Phosphorus (2.5 - 4.5 mg/dL) 3.0 
 
  Magnesium (1.6 - 2.3 mg/dL) 1.9 
 
  Total Bilirubin (0.2 - 1.3 mg/dL) 0.6 
 
  GGT (12 - 43 U/L) 159  H 
 
  AST (14 - 36 U/L) 25 
 
  ALT (9 - 52 U/L) 35 
 
  Alkaline Phosphatase (<127 U/L) 214  H 
 
  Troponin I (< 0.11 ng/ml) 0.02 
 
  Total Protein (6.3 - 8.2 g/dL) 5.8  L 
 
  Albumin (3.5 - 5.0 g/dL) 3.3  L 
 
  Globulin (1.9 - 4.2 gm/dL) 2.5 
 
  Albumin/Globulin Ratio (1.1 - 2.2 %) 1.3 
 
Coagulation  
 
  PT (9.4 - 12.5 SEC) 10.1 
 
  INR (0.90 - 1.19) 0.96 
 
  APTT (25 - 37 SEC) 29 
 
Hematology  
 
  CBC w Diff NO MAN DIFF REQ 
 
  WBC (4.8 - 10.8 /CUMM) 12.0  H 
 
  RBC (4.20 - 5.40 /CUMM) 3.50  L 
 
  Hgb (12.0 - 16.0 G/DL) 10.8  L 
 
  Hct (37 - 47 %) 32.2  L 
 
  MCV (81.0 - 99.0 FL) 92.2 
 
  MCH (27.0 - 31.0 PG) 31.0 
 
  MCHC (33.0 - 37.0 G/DL) 33.6 
 
  RDW (11.5 - 14.5 %) 14.9  H 
 
  Plt Count (130 - 400 /CUMM) 309 
 
  MPV (7.4 - 10.4 FL) 7.2  L 
 
  Gran % (42.2 - 75.2 %) 96.7  H 
 
  Lymphocytes % (20.5 - 51.1 %) 2.3  L 
 
  Monocytes % (1.7 - 9.3 %) 0.9  L 
 
  Eosinophils % (0 - 5 %) 0.1 
 
  Basophils % (0.0 - 2.0 %) 0 
 
  Absolute Granulocytes (1.4 - 6.5 /CUMM) 11.6  H 
 
  Absolute Lymphocytes (1.2 - 3.4 /CUMM) 0.3  L 
 
  Absolute Monocytes (0.10 - 0.60 /CUMM) 0.1 
 
  Absolute Eosinophils (0.0 - 0.7 /CUMM) 0 
 
  Absolute Basophils (0.0 - 0.2 /CUMM) 0 
 
Urines  
 
  Urine Color  Cancelled
 
  Urine Clarity  Cancelled
 
  Urine pH  Cancelled
 
  Ur Specific Gravity  Cancelled
 
  Urine Protein  Cancelled
 
  Urine Ketones  Cancelled
 
  Urine Nitrite  Cancelled
 
  Urine Bilirubin  Cancelled
 
  Urine Urobilinogen  Cancelled
 
  Ur Leukocyte Esterase  Cancelled
 
  Ur Microscopic  Cancelled
 
  Urine Hemoglobin  Cancelled
 
  Urine Glucose  Cancelled

## 2018-02-01 VITALS — SYSTOLIC BLOOD PRESSURE: 170 MMHG | DIASTOLIC BLOOD PRESSURE: 64 MMHG

## 2018-02-01 VITALS — DIASTOLIC BLOOD PRESSURE: 68 MMHG | SYSTOLIC BLOOD PRESSURE: 186 MMHG

## 2018-02-01 VITALS — SYSTOLIC BLOOD PRESSURE: 110 MMHG | DIASTOLIC BLOOD PRESSURE: 65 MMHG

## 2018-02-01 LAB
ABSOLUTE GRANULOCYTE CT: 12.1 /CUMM (ref 1.4–6.5)
BASOPHILS # BLD: 0 /CUMM (ref 0–0.2)
BASOPHILS NFR BLD: 0.1 % (ref 0–2)
EOSINOPHIL # BLD: 0 /CUMM (ref 0–0.7)
EOSINOPHIL NFR BLD: 0.2 % (ref 0–5)
ERYTHROCYTE [DISTWIDTH] IN BLOOD BY AUTOMATED COUNT: 14.6 % (ref 11.5–14.5)
GRANULOCYTES NFR BLD: 89 % (ref 42.2–75.2)
HCT VFR BLD CALC: 24.9 % (ref 37–47)
LYMPHOCYTES # BLD: 0.8 /CUMM (ref 1.2–3.4)
MCH RBC QN AUTO: 30.8 PG (ref 27–31)
MCHC RBC AUTO-ENTMCNC: 33.7 G/DL (ref 33–37)
MCV RBC AUTO: 91.6 FL (ref 81–99)
MONOCYTES # BLD: 0.6 /CUMM (ref 0.1–0.6)
PLATELET # BLD: 218 /CUMM (ref 130–400)
PMV BLD AUTO: 8.1 FL (ref 7.4–10.4)
RED BLOOD CELL CT: 2.72 /CUMM (ref 4.2–5.4)
WBC # BLD AUTO: 13.6 /CUMM (ref 4.8–10.8)

## 2018-02-01 NOTE — PN- HOUSESTAFF
Alla PINTO,Jamal 18 0702:
Subjective
Follow-up For:
Back pain and lower extremity weakness
hepatomegaly and ascites
Subjective:
no complaints this morning
back pain improved, ambulating with PT
no abdominal pain, nausea, or vomiting
 
Review of Systems
Constitutional:
Reports: see HPI. 
 
Objective
Last 24 Hrs of Vital Signs/I&O
 Vital Signs
 
 
Date Time Temp Pulse Resp B/P B/P Pulse O2 O2 Flow FiO2
 
     Mean Ox Delivery Rate 
 
 0857  64  182/70     
 
 0800       Room Air  
 
 0648 98.7 66 18 170/64  97 Room Air  
 
 2330 98.3 80 18 130/60  97 Room Air  
 
 2045  70  140/70     
 
 2045  70  140/70     
 
 1639  70  132/80     
 
 
 Intake & Output
 
 
  1600  0800  0000
 
Intake Total 480 210 350
 
Output Total 300  
 
Balance 180 210 350
 
    
 
Intake, IV  10 
 
Intake, Oral 480 200 350
 
Output, Urine 300  
 
 
 
 
Physical Exam
General Appearance: Alert, Oriented X3, Cooperative, No Acute Distress
Cardiovascular: Regular Rate, Normal S1, Normal S2, No Murmurs
Lungs: Clear to Auscultation, Normal Air Movement
Abdomen: Normal Bowel Sounds, Soft, No Tenderness, distended +hepatomegaly, no 
tenderness on palpation
Extremities: No Clubbing, No Cyanosis, No Edema, Normal Pulses
Current Medications:
 Current Medications
 
 
  Sig/Jose E Start time  Last
 
Medication Dose Route Stop Time Status Admin
 
Acetaminophen 500 MG Q6P PRN  1815 AC 
 
  PO   2129
 
Amlodipine Besylate 10 MG DAILY  1000 AC 
 
  PO   0858
 
Atorvastatin Calcium 20 MG AT BEDTIME  2200 AC 
 
  PO   2045
 
Cilostazol 50 MG BID  2200 AC 
 
  PO   0858
 
Clonidine 0.2 MG BID  2200 AC 
 
  PO   0858
 
Hydralazine HCl 75 MG TID  1600 AC 
 
  PO   
 
Hydralazine HCl 50 MG TID  1000 DC 
 
  PO   0857
 
Hydrocodone Bitart/ 1 TAB Q8P PRN  1800 AC 
 
Acetaminophen  PO   
 
Isosorbide  60 MG DAILY  1000 AC 
 
Mononitrate  PO   0858
 
Lidocaine 1 PAT DAILY  1815 AC 
 
  EXT   
 
Metoprolol Succinate 50 MG DAILY  1000 AC 
 
  PO   0858
 
Oxycodone/ 1 TAB Q8P PRN  1800 AC 
 
Acetaminophen  PO   0621
 
Polyethylene Glycol 17 GM BID  1000 AC 
 
  PO   0859
 
Prednisone 10 MG DAILY  1523 AC 
 
  PO   0858
 
Tramadol HCl 50 MG ONCE ONE  2300 DC 
 
  PO  2301  2258
 
 
 
 
Last 24 Hrs of Lab/Lio Results
Last 24 Hrs of Labs/Mics:
 Laboratory Tests
 
18 0920:
CBC w Diff NO MAN DIFF REQ, RBC 2.72  L, MCV 91.6, MCH 30.8, MCHC 33.7, RDW 14.6
 H, MPV 8.1, Gran % 89.0  H, Lymphocytes % 6.0  L, Monocytes % 4.7, Eosinophils 
% 0.2, Basophils % 0.1, Absolute Granulocytes 12.1  H, Absolute Lymphocytes 0.8 
L, Absolute Monocytes 0.6, Absolute Eosinophils 0, Absolute Basophils 0
 
18 0817:
Anion Gap 11, Estimated GFR 40  L, BUN/Creatinine Ratio 37.7  H
 
 
Assessment/Plan
Assessment:
77-year-old female with past medical history of CAD status post CABG (), PVD
(status post angioplasty with stent placement), HTN,stage IV chronic kidney 
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 
uterine cancer, colon cancer was admitted acute on chronic back pain, associated
with lower extremity weakness, gait instability, and frequent falls.  The 
patient was found to have hepatomegaly and ascites on admission and underwent 
diagnostic and therapeutic paracentesis on this hospitalization.
 
Acute on chronic back pain: nonradicular, with new weakness, difficulty 
ambulating and falls 
 History of L4 laminectomy in past (Dr. Rodriguez)
 History of neuroforaminal narrowing with possible spinal stenosis. 
 Continue analgesia with Dilaudid/oxycodone/Tylenol.
 Physical therapy consulted, patient will likely need STR placement.
       
Ascites with liver mass:
 CT reports enlarging liver mass 8cm x 8cm x 8cm compared to 2.2cm in 2016
 MRI abdomen to further evaluate liver mass tomorrow
 Potential malignant ascites is of concern 
 History of colon cancer in past (resected ). 
 Paracentesis today, high SAAG suggests portal hypertension, can not rule out 
malignant ascites
 GI consulted, appreciate recommendations
 Outpatient GI follow up for further testing
 
RLE edema:
 Ultrasound Doppler negative for DVT. 
 
CAD/HTN:
 Continue betablocker, nitrates and antihypertensives
 Patient was hypertensive today, hydralazine dose and frequency increased
 Patient is not currently on an ACEi?
 
HLD:
 Statin on hold
 Trend LFTs
 
GERD: Continue omeprazole
 
CKD stage IIIA: stable
 Avoid nephrotoxins
 
Heart healthy diet
DVT ppx-heparin 5000 units subcutaneous Q8H
Full code
Problem List:
 1. Peripheral vascular occlusive disease
 
 2. CAD (coronary artery disease)
 
 3. Hypertension
 
 4. Liver lesion
 
 5. Ascites
 
 6. Chronic renal insufficiency
 
 7. Unsteady gait
 
 8. Falls frequently
 
 9. History of colon cancer
 
 10. History of uterine cancer
 
Pain Ratin
Pain Location:
n/a
Pain Goal: Pain 4 or less
Pain Plan:
prn
Tomorrow's Labs & Rationales:
James Tejada MD 18 1333:
Attending MD Review Statement
 
Attending Statement
Attending MD Statement: examined this patient, discuss w/resident/PA/NP, agreed 
w/resident/PA/NP, reviewed EMR data (avail)
Attending Assessment/Plan:
Doing well today.  Back pain is improved, no neurological deficit, still weak 
and unsteady.  Paracentesis shows no signs of SBP, consistent with acute liver 
injury or cirrhosis.  Cytology sent.  Patient requires an MRI for workup of 
liver mass (cannot obtain triple-phase CT due to CKD), and this can be done as 
an outpatient.  Can be discharged to [facility] if bed is available, with outpatient 
liver MRI and GI follow up.

## 2018-02-02 VITALS — DIASTOLIC BLOOD PRESSURE: 62 MMHG | SYSTOLIC BLOOD PRESSURE: 110 MMHG

## 2018-02-02 VITALS — DIASTOLIC BLOOD PRESSURE: 60 MMHG | SYSTOLIC BLOOD PRESSURE: 180 MMHG

## 2018-02-02 VITALS — DIASTOLIC BLOOD PRESSURE: 68 MMHG | SYSTOLIC BLOOD PRESSURE: 120 MMHG

## 2018-02-02 NOTE — PN- HOUSESTAFF
Alla PINTO,Jamal 1817:
Subjective
Follow-up For:
back pain/lower extremity weakness
liver mass/hepatomegaly/ascites
Subjective:
ambulated with PT yesterday, pending STR can't do stairs at home
no abdominal pain, refused MRI for evaluation of liver mass this morning
back pain is controlled, had one dose of tramadol overnight
 
Review of Systems
Constitutional:
Reports: see HPI. 
 
Objective
Last 24 Hrs of Vital Signs/I&O
 Vital Signs
 
 
Date Time Temp Pulse Resp B/P B/P Pulse O2 O2 Flow FiO2
 
     Mean Ox Delivery Rate 
 
 1333    170/67     
 
 1128 98.6 61 16 120/68     
 
 1127 98.6 61 16 120/68     
 
 0600 98.6 61 16 120/68  97 Room Air  
 
 2300 98.0 64 18 186/68  98 Room Air  
 
 2207  65  114/60     
 
 2202  64  186/68     
 
 2201  64  186/68     
 
 1518 97.6 60 18 110/65  98   
 
 
 Intake & Output
 
 
  1600  0800  0000
 
Intake Total  400 500
 
Output Total   250
 
Balance  400 250
 
    
 
Intake, Oral  400 500
 
Output, Urine   250
 
 
 
 
Physical Exam
General Appearance: Alert, Oriented X3, Cooperative, No Acute Distress
Cardiovascular: Regular Rate, Normal S1, Normal S2, No Murmurs
Lungs: bibasilar crackles
Abdomen: Normal Bowel Sounds, Soft, distended but soft, nontender, hepatomegaly
Neurological: Strength at 5/5 X4 Ext
Extremities: No Clubbing, No Cyanosis, Normal Pulses, RLE 1+ edema, -dvt
Current Medications:
 Current Medications
 
 
  Sig/Jose E Start time  Last
 
Medication Dose Route Stop Time Status Admin
 
Acetaminophen 500 MG Q6P PRN  1815 AC 
 
  PO   9
 
Amlodipine Besylate 10 MG DAILY  1000 AC 
 
  PO   1128
 
Atorvastatin Calcium 20 MG AT BEDTIME 2200 AC 
 
  PO   2202
 
Cilostazol 50 MG BID 2200 AC 
 
  PO   1133
 
Clonidine 0.2 MG BID 2200 AC 
 
  PO   1127
 
Hydralazine HCl 75 MG TID  1600 AC 
 
  PO   1333
 
Hydrocodone Bitart/ 1 TAB Q8P PRN  1800 AC 
 
Acetaminophen  PO   
 
Isosorbide  60 MG DAILY  1000 AC 
 
Mononitrate  PO   1127
 
Lidocaine 1 PAT DAILY  1815 AC 
 
  EXT   1126
 
Lorazepam 2 MG ONE PRN  0730 DC 
 
  IV  1200  
 
Metoprolol Succinate 50 MG DAILY  1000 AC 
 
  PO   1127
 
Oxycodone/ 1 TAB Q8P PRN  1800 AC 
 
Acetaminophen  PO   0621
 
Polyethylene Glycol 17 GM BID  1000 AC 
 
  PO   1126
 
Prednisone 10 MG DAILY  1523 AC 
 
  PO   1127
 
Tramadol HCl 50 MG ONCE ONE 2200 DC 
 
  PO 
 
 
 
 
Assessment/Plan
Assessment:
77-year-old female with past medical history of CAD status post CABG (), PVD
(status post angioplasty with stent placement), HTN,stage IV chronic kidney 
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 
uterine cancer, colon cancer was admitted acute on chronic back pain, associated
with lower extremity weakness, gait instability, and frequent falls.  The 
patient was found to have hepatomegaly and ascites on admission and underwent 
diagnostic and therapeutic paracentesis on this hospitalization.
 
Acute on chronic back pain: nonradicular, with new weakness, difficulty 
ambulating and falls 
 History of L4 laminectomy in past (Dr. Rodriguez)
 History of neuroforaminal narrowing with possible spinal stenosis. 
 Continue analgesia with Dilaudid/oxycodone/Tylenol.
 Physical therapy consulted, patient will likely need STR placement.
       
Ascites with liver mass:
 CT reports enlarging liver mass 8cm x 8cm x 8cm compared to 2.2cm in 2016
 MRI abdomen to further evaluate liver mass tomorrow
 Likely malignancy, patient refused MRI today
 History of colon cancer in past (resected ). 
 High SAAG suggests portal hypertension, can not rule out malignant ascites
 Follow up cytology and alpha fetoprotein
 GI consulted, appreciate recommendations
 Outpatient GI follow up for further testing
 
CAD/HTN:
 Continue betablocker, nitrates and antihypertensives
 Patient was hypertensive today, hydralazine dose and frequency increased
 Patient is not currently on an ACEi?
 
HLD:
 Statin on hold
 Trend LFTs
 
GERD: Continue omeprazole
 
CKD stage IIIA: stable
 Avoid nephrotoxins
 
Heart healthy diet
DVT ppx-heparin 5000 units subcutaneous Q8H
Full code
Problem List:
 1. CAD (coronary artery disease)
 
 2. Peripheral vascular occlusive disease
 
 3. Ascites
 
 4. Liver lesion
 
 5. Unsteady gait
 
Pain Ratin
Pain Location:
lumbar
Pain Goal: Pain 4 or less
Pain Plan:
prn
Tomorrow's Labs & Rationales:
afp
 
 
Conrado PINTOJames 18 1242:
Attending MD Review Statement
 
Attending Statement
Attending MD Statement: examined this patient, discuss w/resident/PA/NP, agreed 
w/resident/PA/NP, reviewed EMR data (avail)
Attending Assessment/Plan:
77F PMH CAD status post CABG (), PVD (status post angioplasty with stent 
placement), HTN,stage IV chronic kidney disease secondary to hypertensive 
nephrosclerosis, HLD, lumbar disc surgery, uterine cancer, colon cancer admitted
with acute onset of lower back pain with lower extremity weakness, as her legs 
just gave out.  Happens to patient about once a year, improved during admission 
with physical therapy and pain control.  Was found incidentally to have ascites 
now s/p paracentesis removing 6L, with ascitic fluid showing no evidence of SBP 
and consistent with cirrhosis/hepatitis.  CT of the abdomen shows large mass and
requires MRI, but patient is claustrophobic and will have outpatient open MRI.
 
Doing well today.  Awaiting insurance authorization for STR placement.  BP 
improved after increasing Hydralazine dose yesterday.
 
1. Ascites, very likely to be malignant given hepatic mass and history of 
multiple malignancies
2. Acute on chronic lower back pain
3. Bilateral lower extremity weakness
 
Plan
- Stable for discharge to STR
- Continue home medications
- Increase Hydralazine to 75mg TID on discharge
- Continue home Prednisone
- Outpatient GI follow up and open MRI liver

## 2018-02-02 NOTE — PATIENT DISCHARGE INSTRUCTIONS
Discharge Instructions
 
General Discharge Information
You were seen/treated for:
hepatomegaly/ascites/liver mass
back pain
Special Instructions:
Please follow up with GI, Dr. Jolly to evaluate your liver.
Please follow-up with your PCP within one week of discharge to check creatinine 
and potassium numbers
 
Acute Coronary Syndrome
 
Inclusion Criteria
At DC or during hospital stay patient has or had the following:
ACS DIAGNOSIS No
 
Discharge Core Measures
Meds if any: Prescribed or Continued at Discharge
Meds if any: NOT Prescribed or Continued at Discharge
 
Congestive Heart Failure
 
Inclusion Criteria
At DC or during hospital stay patient has or had the following:
CHF DIAGNOSIS No
 
Discharge Core Measures
Meds if any: Prescribed or Continued at Discharge
Meds if any: NOT Prescribed or Continued at Discharge
 
Cerebrovascular accident
 
Inclusion Criteria
At DC or during hospital stay patient has or had the following:
CVA/TIA Diagnosis No
 
Discharge Core Measures
Meds if any: Prescribed or Continued at Discharge
Meds if any: NOT Prescribed or Continued at Discharge
 
Venous thromboembolism
 
Inclusion Criteria
VTE Diagnosis No
VTE Type NONE
VTE Confirmed by (Test) NONE
 
Discharge Core Measures
- Per Current guidelines, there needs to be overlap
- treatment for the first 5 days of Warfarin therapy.
- If discharged on Warfarin prior to 5 days of
- overlap therapy, the patient will need to be
- assessed for post discharge needs including
- *Post discharge parental anticoagulation
- *Warfarin and/or parental anticoagulation education
- *Follow up date to check INR post discharge
At least 5 days overlap therapy as Inpatient No
Meds if any: Prescribed or Continued at Discharge
Note: Overlap Therapy is Warfarin and Anticoagulant
Meds if any: NOT Prescribed or Continued at Discharge

## 2018-02-02 NOTE — DISCHARGE SUMMARY
Visit Information
 
Visit Dates
Admission Date:
01/29/18
 
Discharge Date:
02/02/17
 
 
Hospital Course
 
Course
Attending Physician:
James Camp MD
 
Primary Care Physician:
Adalberto De La O MD
 
Hospital Course:
77-year-old female who presented to the ED with a chief complaint of 2 weeks of 
progressive back pain associated with lower extremity weakness, gait instability
and frequent falls.  She also reported increased abdominal girth.
 
PMH: of CAD s/p CABG (2000), PVD s/p angioplasty with stent placement, HTN, 
stage IV CKD secondary to hypertensive nephrosclerosis, HLD, lumbar disc surgery
, uterine cancer, colon cancer.
 
 Issues that was addressed during this admission:
 
#Acute on chronic back pain: 
The patient has history of L4 laminectomy in past (Dr. Rodriguez), History of 
neuroforaminal narrowing with possible spinal stenosis, and she reported recent 
fall.  CT showed: Degenerative spondylosis of the facet joints at the lower 
lumbar spine, without acute osseous abnormality. No focal bone lesion.  She was 
treated with pain medication as needed.  Physical therapy recommended STR 
placement.
       
#Ascites with liver mass(high suspicious of liver cancer)
 CT reports enlarging liver mass 8cm x 8cm x 8cm compared to 2.2cm in 2016.  
Ascites is SAAG was >1.1 and suggestive of portal hypertension however 
malignancy cannot be ruled out. GI consult recommended triphasic MRI to further 
address the liver mass.  The patient refused MRI because she is claustrophobic. 
Given that the patient has a history of cancer in the past we will instruct her 
to do the MRI as an outpatient (open MRI) and follow the GI and primary care 
doctor as soon as possible postdischarge.
 
 The patient alpha-fetoprotein was found to be negative. CEA, antimitochondrial 
antibody, and anti-smooth muscle antibody are all pending
 
#Chronic stable conditions includes CAD/HTN/HLD/GERD/CKD
 For this we continued her home dose of beta blocker, nitrates, 
antihypertensives and omeprazole. 
 
#For DVT prophylaxis we give heparin 5000 units subcutaneous was given at all 
time
Allergies:
Coded Allergies:
Sulfa (Sulfonamide Antibiotics) (Severe, HIVES 11/20/16)
 
Pertinent Lab Results:
SERVICE DATE: 01/29/-EXAM TYPE: CAT - CT ABD & PELVIS W/O IV CONTRAS
 
EXAMINATION:
CT ABDOMEN AND PELVIS WITHOUT CONTRAST
 
CLINICAL INFORMATION:
Ascites. Lower back pain. Swelling.
 
COMPARISON:
Ultrasound abdomen 01/18/2018. CT abdomen and pelvis 09/28/2016.
 
TECHNIQUE:
Multidetector volumetric imaging was performed from the superior aspect of
the liver through the pubic symphysis. Sagittal and coronal reformatted
images were obtained on the technologist's workstation.
 
DLP:
386.18 mGy-cm
 
FINDINGS:
 
LUNG BASES: Small volume left pleural effusion. Linear atelectasis at the
left lung base. Status post median sternotomy. Vascular calcification of the
coronary arteries and thoracic aorta.
 
LIVER, GALLBLADDER, AND BILIARY TREE: Large area of low attenuation
consistent with a hepatic mass involving much of the midline right liver.
Mass extends from the herminio hepatis to the dome measuring about 8.2 x 7.6 x 8
cm. This causes lobular bulging of the margin of the liver. On the CT scan of
09/28/2016, there was a hypodense lesion at the dome of the liver measuring
2.2 cm.
There is no intrahepatic bile duct dilatation.
 
The gallbladder is unremarkable with no evidence of radiopaque gallstones,
gallbladder wall thickening, or obvious pericholecystic inflammatory changes.
 
 
PANCREAS: Pancreas is atrophic. No pancreatic duct dilatation or mass.
 
SPLEEN: Unremarkable.
 
ADRENAL GLANDS: Unremarkable.
 
KIDNEYS AND URETERS: Bilateral renal cysts. There is a hyperdense cyst in the
cortex of the upper pole right kidney measuring 1.8 cm. Hyperdense cyst in
the mid pole of the left kidney measuring 3.8 cm.
 
BLADDER: Small collection of air in the bladder. No Rodriguez catheter seen.
Correlate with history of bladder instrumentation. No bladder wall
thickening.
 
GASTROINTESTINAL TRACT: Surgical line of sutures at the proximal sigmoid
colon from prior partial colectomy. No acute change of the bowel. No bowel
obstruction. No bowel wall dilatation. There is a large collection of stool
throughout the colon from cecum through pelvis.
The appendix is not seen.
 
MESENTERY: There is a large volume of abdominal ascites. Surgical clips in
the mesentery adjacent to the descending colon left side of abdomen.
 
ABDOMINAL WALL: There is a midline ventral hernia at the upper abdomen just
below the diaphragm at the midline. The defect in the anterior abdominal wall
measures 4.7 x 5.7 cm. There is a large pocket of abdominal ascites at this
hernia site extending into the subcutaneous tissue. The herniated pocket
measures 7.9 x 3.0 x 9.4 cm and bulges the anterior skin line at the upper
anterior abdomen.
There is generalized anasarca.
 
LYMPH NODES: Normal.
 
VASCULAR: Extensive atherosclerotic vascular wall calcifications throughout
the abdomen and pelvis. No aneurysm of the aorta.
 
PELVIC VISCERA: Uterus is absent. No adnexal abnormality.
 
OSSEOUS STRUCTURES: Degenerative spondylosis of the spine with multilevel
endplate spurring of the thoracic vertebrae. Degenerative spondylosis of the
facet joints at the lower lumbar spine. Status post left-sided laminectomy L4
vertebra.
Status post median sternotomy.
No acute osseous abnormality. No focal bone lesion.
 
IMPRESSION:
1. Large volume of abdominal ascites. Generalized anasarca. Small left
pleural effusion.
2. Suspicious large lesion in the liver. This can be further characterized
with dynamic MRI.
3. Degenerative changes of the spine. Status post left-sided laminectomy L4
vertebra.
 
Disposition Summary
 
Disposition
Principal Diagnosis:
Abdominal Ascites with liver mass thats highly suspicious for malignancy.
Additional Diagnosis:
Spinal stenosis
Discharge Disposition: SNF
 
Discharge Instructions
 
General Discharge Information
Code Status: Full Code
Patient's Diet:
Heart healthy diet
Patient's Activity:
As tolerated
Follow-Up Instructions/Appts:
-The patient will need to follow with her primary care doctor within 1 week.
 
-The patient has a mass in the liver that is suspicious for cancer, she will be 
instructed to remove and follow with GI as stated in the discharge instructions.
 This will need to be done as soon as possible postdischarge.
 
Medications at Discharge
Discharge Medications:
Continue taking these medications:
Cilostazol (Cilostazol) 50 MG TAB
    1 Tablet ORAL TWICE DAILY
    Qty = 60
    Comments:
       TAKE 1 TABLET BY MOUTH TWICE A DAY 
       START TONIGHT 7/15/14
 
Clonidine (Catapres) 0.2 MG TABLET
    1 Tablet ORAL TWICE DAILY
 
Atorvastatin Calcium (Lipitor) 20 MG TABLET
    1 Tablet ORAL AT BEDTIME
    Comments:
       LAST GIVEN 7/14/14
       START TOMORROW 7/15/14
 
Isosorbide Mononitrate (Imdur) 60 MG TER
    1 Tablet ORAL DAILY
    Comments:
       LAST GIVEN 7/15/14
       START TOMORROW 7/16/14
 
Aspirin (Children's Aspirin) 81 MG TAB.CHEW
    1 Tablet ORAL DAILY
    Comments:
       START TOMORROW 7/16/14
 
Cholecalciferol (Vitamin D3) (Vitamin D3) 2,000 UNIT CAPSULE
    1 Capsule ORAL TWICE DAILY
    Comments:
       PER PT 
 
Omeprazole (Omeprazole) 40 MG CAPSULE.DR
    1 Capsule ORAL DAILY
    Qty = 30
 
Omega-3 Fatty Acids/Fish Oil (Fish Oil 1,000 MG Capsule) 340 MG-1,000 MG CAPSULE
    2 Capsule ORAL DAILY
 
Amlodipine Besylate (Norvasc) 10 MG TABLET
    1 Tablet ORAL DAILY
    Days = 30
 
Metoprolol Succinate (Metoprolol Succinate) 50 MG TAB.ER.24H
    1 Tablet ORAL DAILY
    Days = 30
 
Prednisone (Prednisone) 5 MG TABLET
    1.5 Tablet ORAL DAILY
    Qty = 45
 
Start taking the following new medications:
Polyethylene Glycol 3350 (Miralax) 17 GRAM/DOSE POWDER
    17 Gram ORAL TWICE DAILY
    Qty = 30
    No Refills
 
The following medications have been changed:
Old:
Hydralazine HCl (Hydralazine HCl) 50 MG TABLET
    1 Tablet ORAL TWICE DAILY
    Days = 30
 
New:
Hydralazine HCl (Hydralazine HCl) 50 MG TABLET
    1.5 Tablet ORAL THREE TIMES DAILY
    Qty = 90
 
 
Copies To:
Patricia PINTO,Filipe HOYOS; Jairon PINTO,Adalberto CARLSON

## 2018-02-02 NOTE — PN- GASTROENTEROLOGY
Assessment/Plan
Assessment/Recommendations:
ASSESSMENT:
1.  New onset ascites.  Await cytology.
2.  Lesion in right lobe of the liver question primary hepatocellular carcinoma 
versus metastatic disease given history of endometrial cancer as well as colon 
cancer.
3.  Back pain
4.  Chronic anemia of long duration
5.  Chronic constipation.  CT scan of the abdomen and pelvis shows large stool 
burden
 
RECOMMENDATIONS:
1.  Would check alpha-fetoprotein as well as CEA 
2.  Would obtain triple phase MRI as hepatocellular carcinoma has a particular 
bright appearance (i.e. hypervascular on the arterial phase of imaging) and 
display portal vein/delayed phase wash-out (or pseudocapsule enhancement).  
These findings can only be elicited with a with and without contrast study.  
Patient unable to tolerate closed MRI and awaiting study at open MRI.
3.  Would check AMA and ASMA as patient may have burned out autoimmune hepatitis
to account for portal hypertension. 
4. Patient to be discharge.  Make sure that patient has follow up appointment 
with me in the office.
5.  GI will sign off for now.  Please do not hesitate to contact us as needed. 
 
 
Subjective
Subjective:
Patient comfortable.  No GI complaints.  Unable to get open MRI as in-patient.  
AFP added to labs today.
 
Objective
Vital Signs and I&Os
Vital Signs
 
 
Date Time Temp Pulse Resp B/P B/P Pulse O2 O2 Flow FiO2
 
     Mean Ox Delivery Rate 
 
02/02 1500 98.2 55 20 110/62  96   
 
02/02 1333    170/67     
 
02/02 1128 98.6 61 16 120/68     
 
02/02 1127 98.6 61 16 120/68     
 
02/02 0600 98.6 61 16 120/68  97 Room Air  
 
02/01 2300 98.0 64 18 186/68  98 Room Air  
 
02/01 2207  65  114/60     
 
02/01 2202  64  186/68     
 
02/01 2201  64  186/68     
 
 
 Intake & Output
 
 
 02/02 1600 02/02 0400 02/01 1600 02/01 0400 01/31 1600 01/31 0400
 
Intake Total 640 500 690 350 560 360
 
Output Total  250 300  250 
 
Balance 640 250 390 350 310 360
 
       
 
Intake, IV   10   
 
Intake, Oral 640 500 680 350 560 360
 
Output, Urine  250 300  250 
 
 
 
 
Physical Exam
General Appearance: alert, awake
Respiratory: no respiratory distress, lungs clear
Cardiovascular: regular rate/rhythm, Normal S1 and S2 without Rub, Murmur or 
Gallop
Abdomen: normal bowel sounds, soft, non-tender
Neurologic/Psychiatric: awake, alert, oriented x 3
Skin: normal color, warm/dry
Current Medications:
 Current Medications
 
 
  Sig/Jose E Start time  Last
 
Medication Dose Route Stop Time Status Admin
 
Acetaminophen 500 MG Q6P PRN 01/29 1815 AC 01/30
 
  PO   2129
 
Amlodipine Besylate 10 MG DAILY 01/30 1000 AC 02/02
 
  PO   1128
 
Atorvastatin Calcium 20 MG AT BEDTIME 01/29 2200 AC 02/01
 
  PO   2202
 
Cilostazol 50 MG BID 01/29 2200 AC 02/02
 
  PO   1133
 
Clonidine 0.2 MG BID 01/29 2200 AC 02/02
 
  PO   1127
 
Hydralazine HCl 75 MG TID 02/01 1600 AC 02/02
 
  PO   1333
 
Hydrocodone Bitart/ 1 TAB Q8P PRN 01/29 1800 AC 
 
Acetaminophen  PO   
 
Isosorbide  60 MG DAILY 01/30 1000 AC 02/02
 
Mononitrate  PO   1127
 
Lidocaine 1 PAT DAILY 01/29 1815 AC 02/02
 
  EXT   1126
 
Lorazepam 2 MG ONE PRN 02/02 0730 DC 
 
  IV 02/02 1200  
 
Metoprolol Succinate 50 MG DAILY 01/30 1000 AC 02/02
 
  PO   1127
 
Oxycodone/ 1 TAB Q8P PRN 01/29 1800 AC 02/01
 
Acetaminophen  PO   0621
 
Polyethylene Glycol 17 GM BID 01/31 1000 AC 02/02
 
  PO   1126
 
Prednisone 10 MG DAILY 01/30 1523 AC 02/02
 
  PO   1127
 
Tramadol HCl 50 MG ONCE ONE 02/01 2200 DC 02/01
 
  PO 02/01 2201 2202
 
 
 
 
Results
Pertinent Lab Results:
 Laboratory Tests
 
 
 02/01 02/01 02/01
 
 0920 0817 0600
 
Chemistry   
 
  Sodium (137 - 145 mmol/L)  137 
 
  Potassium (3.5 - 5.1 mmol/L)  4.4 
 
  Chloride (98 - 107 mmol/L)  104 
 
  Carbon Dioxide (22 - 30 mmol/L)  22 
 
  Anion Gap (5 - 16)  11 
 
  BUN (7 - 17 mg/dL)  49  H 
 
  Creatinine (0.5 - 1.0 mg/dL)  1.3  H 
 
  Estimated GFR (>60 ml/min)  40  L 
 
  BUN/Creatinine Ratio (7 - 25 %)  37.7  H 
 
  Alpha Fetoprotein   Cancelled
 
Hematology   
 
  CBC w Diff NO MAN DIFF REQ  
 
  WBC (4.8 - 10.8 /CUMM) 13.6  H  
 
  RBC (4.20 - 5.40 /CUMM) 2.72  L  
 
  Hgb (12.0 - 16.0 G/DL) 8.4  L  
 
  Hct (37 - 47 %) 24.9  L  
 
  MCV (81.0 - 99.0 FL) 91.6  
 
  MCH (27.0 - 31.0 PG) 30.8  
 
  MCHC (33.0 - 37.0 G/DL) 33.7  
 
  RDW (11.5 - 14.5 %) 14.6  H  
 
  Plt Count (130 - 400 /CUMM) 218  
 
  MPV (7.4 - 10.4 FL) 8.1  
 
  Gran % (42.2 - 75.2 %) 89.0  H  
 
  Lymphocytes % (20.5 - 51.1 %) 6.0  L  
 
  Monocytes % (1.7 - 9.3 %) 4.7  
 
  Eosinophils % (0 - 5 %) 0.2  
 
  Basophils % (0.0 - 2.0 %) 0.1  
 
  Absolute Granulocytes (1.4 - 6.5 /CUMM) 12.1  H  
 
  Absolute Lymphocytes (1.2 - 3.4 /CUMM) 0.8  L  
 
  Absolute Monocytes (0.10 - 0.60 /CUMM) 0.6  
 
  Absolute Eosinophils (0.0 - 0.7 /CUMM) 0  
 
  Absolute Basophils (0.0 - 0.2 /CUMM) 0  
 
 
 
 
 01/31 01/31
 
 0748 0600
 
Chemistry  
 
  Sodium (137 - 145 mmol/L) 138 
 
  Potassium (3.5 - 5.1 mmol/L) 4.5 
 
  Chloride (98 - 107 mmol/L) 104 
 
  Carbon Dioxide (22 - 30 mmol/L) 25 
 
  Anion Gap (5 - 16) 9 
 
  BUN (7 - 17 mg/dL) 36  H 
 
  Creatinine (0.5 - 1.0 mg/dL) 1.3  H 
 
  Estimated GFR (>60 ml/min) 40  L 
 
  BUN/Creatinine Ratio (7 - 25 %) 27.7  H 
 
  Iron (37 - 170 ug/dL) 26  L 
 
  TIBC (265 - 497 ug/dL) 153  L 
 
  Ferritin (11.1 - 264 ng/mL) 151.0 
 
  Total Bilirubin (0.2 - 1.3 mg/dL) 0.7 
 
  Direct Bilirubin (< 0.4 mg/dL) 0.3 
 
  AST (14 - 36 U/L) 32 
 
  ALT (9 - 52 U/L) 35 
 
  Alkaline Phosphatase (<127 U/L) 212  H 
 
  Total Protein (6.3 - 8.2 g/dL) 4.5  L 
 
  Albumin (3.5 - 5.0 g/dL) 2.6  L 
 
  Alpha Fetoprotein  Pending
 
  Vitamin B12 (239 - 931 pg/mL) 796 
 
  Folate (2.76 - 20.0 ng/mL) 4.2 
 
Hematology  
 
  CBC w Diff NO MAN DIFF REQ 
 
  WBC (4.8 - 10.8 /CUMM) 12.2  H 
 
  RBC (4.20 - 5.40 /CUMM) 2.86  L 
 
  Hgb (12.0 - 16.0 G/DL) 8.8  L 
 
  Hct (37 - 47 %) 26.3  L 
 
  MCV (81.0 - 99.0 FL) 92.0 
 
  MCH (27.0 - 31.0 PG) 30.6 
 
  MCHC (33.0 - 37.0 G/DL) 33.3 
 
  RDW (11.5 - 14.5 %) 15.0  H 
 
  Plt Count (130 - 400 /CUMM) 213 
 
  MPV (7.4 - 10.4 FL) 8.1 
 
  Gran % (42.2 - 75.2 %) 94.0  H 
 
  Lymphocytes % (20.5 - 51.1 %) 4.4  L 
 
  Monocytes % (1.7 - 9.3 %) 1.5  L 
 
  Eosinophils % (0 - 5 %) 0 
 
  Basophils % (0.0 - 2.0 %) 0.1 
 
  Absolute Granulocytes (1.4 - 6.5 /CUMM) 11.5  H 
 
  Absolute Lymphocytes (1.2 - 3.4 /CUMM) 0.5  L 
 
  Absolute Monocytes (0.10 - 0.60 /CUMM) 0.2 
 
  Absolute Eosinophils (0.0 - 0.7 /CUMM) 0 
 
  Absolute Basophils (0.0 - 0.2 /CUMM) 0

## 2018-02-03 VITALS — DIASTOLIC BLOOD PRESSURE: 60 MMHG | SYSTOLIC BLOOD PRESSURE: 148 MMHG

## 2018-02-03 VITALS — SYSTOLIC BLOOD PRESSURE: 160 MMHG | DIASTOLIC BLOOD PRESSURE: 60 MMHG

## 2018-02-03 VITALS — DIASTOLIC BLOOD PRESSURE: 60 MMHG | SYSTOLIC BLOOD PRESSURE: 115 MMHG

## 2018-02-03 LAB
ABSOLUTE GRANULOCYTE CT: 9.9 /CUMM (ref 1.4–6.5)
BASOPHILS # BLD: 0 /CUMM (ref 0–0.2)
BASOPHILS NFR BLD: 0 % (ref 0–2)
EOSINOPHIL # BLD: 0 /CUMM (ref 0–0.7)
EOSINOPHIL NFR BLD: 0.2 % (ref 0–5)
ERYTHROCYTE [DISTWIDTH] IN BLOOD BY AUTOMATED COUNT: 14.9 % (ref 11.5–14.5)
GRANULOCYTES NFR BLD: 88.6 % (ref 42.2–75.2)
HCT VFR BLD CALC: 24.7 % (ref 37–47)
LYMPHOCYTES # BLD: 0.6 /CUMM (ref 1.2–3.4)
MCH RBC QN AUTO: 30.6 PG (ref 27–31)
MCHC RBC AUTO-ENTMCNC: 33.5 G/DL (ref 33–37)
MCV RBC AUTO: 91.3 FL (ref 81–99)
MONOCYTES # BLD: 0.6 /CUMM (ref 0.1–0.6)
PLATELET # BLD: 229 /CUMM (ref 130–400)
PMV BLD AUTO: 7.7 FL (ref 7.4–10.4)
RED BLOOD CELL CT: 2.7 /CUMM (ref 4.2–5.4)
WBC # BLD AUTO: 11.2 /CUMM (ref 4.8–10.8)

## 2018-02-03 NOTE — PN- HOUSESTAFF
Alla PINTO,Jamal 18 0859:
Subjective
Follow-up For:
back pain/weakness
hepatomegaly, liver mass, ascites
Subjective:
no complaints this morning, no abdominal pain, patient wants to go to [facility], 
refused MRI as an inpatient
 
Review of Systems
Constitutional:
Reports: see HPI. 
 
Objective
Last 24 Hrs of Vital Signs/I&O
 Vital Signs
 
 
Date Time Temp Pulse Resp B/P B/P Pulse O2 O2 Flow FiO2
 
     Mean Ox Delivery Rate 
 
 0742 98.2 63 20 160/60  96 Room Air  
 
 0000      97 Room Air  
 
 2157 98.3 72 20 180/60  97   
 
 2117  64  175/68     
 
 211  64  175/68     
 
 1500 98.2 55 20 110/62  96   
 
 1333    170/67     
 
 1128 98.6 61 16 120/68     
 
 1127 98.6 61 16 120/68     
 
 
 Intake & Output
 
 
  1600  0800  0000
 
Intake Total  0 500
 
Output Total   
 
Balance  0 500
 
    
 
Intake, IV  0 
 
Intake, Oral  0 500
 
Number  0 
 
Bowel   
 
Movements   
 
Patient  58.967 kg 
 
Weight   
 
 
 
 
Physical Exam
General Appearance: Alert, Oriented X3, Cooperative, No Acute Distress
Cardiovascular: Regular Rate, Normal S1, Normal S2, No Murmurs
Lungs: Clear to Auscultation, Normal Air Movement
Abdomen: Normal Bowel Sounds, Soft, distended but soft, firm hepatomegaly, no 
tenderness on palpation
Extremities: No Clubbing, No Cyanosis, minimal RLE edema
Current Medications:
 Current Medications
 
 
  Sig/Jose E Start time  Last
 
Medication Dose Route Stop Time Status Admin
 
Acetaminophen 500 MG Q6P PRN 1815 AC 
 
  PO   
 
Amlodipine Besylate 10 MG DAILY  1000 AC 
 
  PO   112
 
Atorvastatin Calcium 20 MG AT BEDTIME 2200 AC 
 
  PO   
 
Cilostazol 50 MG BID 2200 AC 
 
  PO   
 
Clonidine 0.2 MG BID 2200 AC 
 
  PO   
 
Hydralazine HCl 75 MG TID  1600 AC 
 
  PO   211
 
Hydrocodone Bitart/ 1 TAB Q8P PRN  1800 AC 
 
Acetaminophen  PO   
 
Isosorbide  60 MG DAILY  1000 AC 
 
Mononitrate  PO   112
 
Lidocaine 1 PAT DAILY 1815 AC 
 
  EXT   1126
 
Metoprolol Succinate 50 MG DAILY  1000 AC 
 
  PO   1127
 
Oxycodone/ 1 TAB Q8P PRN  1800 AC 
 
Acetaminophen  PO   0621
 
Polyethylene Glycol 17 GM BID  1000 AC 
 
  PO   2117
 
Prednisone 10 MG DAILY  1523 AC 
 
  PO   1127
 
Tramadol HCl 50 MG ONE TIME ONE 2115 DC 
 
  PO 3
 
 
 
 
Assessment/Plan
Assessment:
77-year-old female with past medical history of CAD status post CABG (), PVD
(status post angioplasty with stent placement), HTN,stage IV chronic kidney 
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 
uterine cancer, colon cancer was admitted acute on chronic back pain, associated
with lower extremity weakness, gait instability, and frequent falls.  The 
patient was found to have hepatomegaly and ascites on admission and underwent 
diagnostic and therapeutic paracentesis on this hospitalization.
 
Acute on chronic back pain: nonradicular, with new weakness, difficulty 
ambulating and falls 
 History of L4 laminectomy in past (Dr. Rodriguez)
 History of neuroforaminal narrowing with possible spinal stenosis. 
 Continue analgesia with Dilaudid/oxycodone/Tylenol.
 Physical therapy consulted, patient will likely need STR placement.
 Has stairs at home that she doesn't have the strength to climb
       
Ascites with liver mass:
 CT reports enlarging liver mass 8cm x 8cm x 8cm compared to 2.2cm in 2016
 MRI abdomen to further evaluate liver mass tomorrow
 Likely malignancy, patient refused MRI today
 History of colon cancer in past (resected ). 
 High SAAG suggests portal hypertension, can not rule out malignant ascites
 Follow up cytology and alpha fetoprotein
 GI consulted, appreciate recommendations
 Outpatient GI follow up for further testing
 
CAD/HTN:
 Continue betablocker, nitrates, hydralazine, and clonidine
 
HLD:
 Statin on hold
 Trend LFTs
 
GERD: Continue omeprazole
 
CKD stage IIIA: stable
 Avoid nephrotoxins
 
Heart healthy diet
DVT ppx-heparin 5000 units subcutaneous Q8H
Full code
Problem List:
 1. CAD (coronary artery disease)
 
 2. Hypertension
 
 3. History of colon cancer
 
 4. History of uterine cancer
 
 5. Chronic anemia
 
 6. Chronic renal insufficiency
 
 7. Unsteady gait
 
 8. Liver lesion
 
 9. Ascites
 
Pain Ratin
Pain Location:
n/a
Pain Goal: Pain 4 or less
Pain Plan:
prn
Tomorrow's Labs & Rationales:
cbc
 
Deja PINTO,B 18 1156:
Objective
Last 24 Hrs of Vital Signs/I&O
 Vital Signs
 
 
Date Time Temp Pulse Resp B/P B/P Pulse O2 O2 Flow FiO2
 
     Mean Ox Delivery Rate 
 
 1038 98.2 63 20 160/60     
 
 1038 98.2 63 20 160/60     
 
 1038 98.2 63 20 160/60     
 
 1038  63 20 160/60     
 
/ 0742 98.2 63 20 160/60  96 Room Air  
 
 0000      97 Room Air  
 
 2157 98.3 72 20 180/60  97   
 
 211  64  175/68     
 
 211  64  175/68     
 
 1500 98.2 55 20 110/62  96   
 
 1333    170/67     
 
 
 Intake & Output
 
 
  1600  0800  0000
 
Intake Total  0 500
 
Output Total   
 
Balance  0 500
 
    
 
Intake, IV  0 
 
Intake, Oral  0 500
 
Number  0 
 
Bowel   
 
Movements   
 
Patient  130 lb 
 
Weight   
 
 
 
 
 
Attending MD Review Statement
 
Attending Statement
Attending MD Statement: examined this patient, discuss w/resident/PA/NP, agreed 
w/resident/PA/NP, discussed with nursing, discussed with case mgmt
Attending Assessment/Plan:
78 y/o F presented with acute back pain and was found to have ascites s/p para, 
neg for SBP. Ct scan was s/f for large hepatic mass.
Hepatic mass with the concern of malignancy, pt refusing MRI in house as she is 
claustrophobic- plan is for MRI and GI follow up as outpt for further management
 
generalized weakness: Insurance refused STR. pt is very weak and at risk of fall
at home. Discussed with CM and as per CM [facility] services mentioned that they are 
familiar with pt and are not comfortable providing services given her risk of 
falls. Will follow PT and CM in regards to dispo planning. 
Mild leukocytosis: unclear etiology at this point. will rpt CBC. no signs of 
acute infection. 
Hypertension: overnight BP was not very well controlled. will monitor today and 
persistently elevated will adjust her BP meds.  
 
w/resident/PA/NP, discussed with nursing, discussed with case mgmt
Attending Assessment/Plan:
78 y/o F presented with acute back pain and was found to have ascites s/p para, 
neg for SBP. Ct scan was s/f for large hepatic mass.
Hepatic mass - plan is for MRI and GI follow up as outpt for further management 
generalized weakness: Insurance refused STR. pt is very weak and at risk of fall
at home. Discussed with CM and as per CM [facility] services mentioned that they are 
familiar with pt and are not comfortable providing services given her risk of 
falls. Will follow PT and CM in regards to dispo planning. 
Mild leukocytosis: unclear etiology at this point. will rpt CBC. no signs of 
acute infection. 
Hypertension: overnight BP was not very well controlled. will monitor today and 
persistently elevated will adjust her BP meds.

## 2018-02-04 VITALS — DIASTOLIC BLOOD PRESSURE: 68 MMHG | SYSTOLIC BLOOD PRESSURE: 150 MMHG

## 2018-02-04 VITALS — DIASTOLIC BLOOD PRESSURE: 60 MMHG | SYSTOLIC BLOOD PRESSURE: 180 MMHG

## 2018-02-04 VITALS — DIASTOLIC BLOOD PRESSURE: 64 MMHG | SYSTOLIC BLOOD PRESSURE: 138 MMHG

## 2018-02-04 VITALS — SYSTOLIC BLOOD PRESSURE: 118 MMHG | DIASTOLIC BLOOD PRESSURE: 68 MMHG

## 2018-02-04 LAB
ABSOLUTE GRANULOCYTE CT: 10.5 /CUMM (ref 1.4–6.5)
ABSOLUTE GRANULOCYTE CT: 9.3 /CUMM (ref 1.4–6.5)
BASOPHILS # BLD: 0 /CUMM (ref 0–0.2)
BASOPHILS # BLD: 0 /CUMM (ref 0–0.2)
BASOPHILS NFR BLD: 0 % (ref 0–2)
BASOPHILS NFR BLD: 0 % (ref 0–2)
EOSINOPHIL # BLD: 0 /CUMM (ref 0–0.7)
EOSINOPHIL # BLD: 0 /CUMM (ref 0–0.7)
EOSINOPHIL NFR BLD: 0 % (ref 0–5)
EOSINOPHIL NFR BLD: 0.3 % (ref 0–5)
ERYTHROCYTE [DISTWIDTH] IN BLOOD BY AUTOMATED COUNT: 15.1 % (ref 11.5–14.5)
ERYTHROCYTE [DISTWIDTH] IN BLOOD BY AUTOMATED COUNT: 15.2 % (ref 11.5–14.5)
GRANULOCYTES NFR BLD: 87.3 % (ref 42.2–75.2)
GRANULOCYTES NFR BLD: 91.6 % (ref 42.2–75.2)
HCT VFR BLD CALC: 21.2 % (ref 37–47)
HCT VFR BLD CALC: 21.9 % (ref 37–47)
LYMPHOCYTES # BLD: 0.6 /CUMM (ref 1.2–3.4)
LYMPHOCYTES # BLD: 0.7 /CUMM (ref 1.2–3.4)
MCH RBC QN AUTO: 30.7 PG (ref 27–31)
MCH RBC QN AUTO: 30.9 PG (ref 27–31)
MCHC RBC AUTO-ENTMCNC: 33.5 G/DL (ref 33–37)
MCHC RBC AUTO-ENTMCNC: 33.7 G/DL (ref 33–37)
MCV RBC AUTO: 91 FL (ref 81–99)
MCV RBC AUTO: 92.3 FL (ref 81–99)
MONOCYTES # BLD: 0.4 /CUMM (ref 0.1–0.6)
MONOCYTES # BLD: 0.7 /CUMM (ref 0.1–0.6)
PLATELET # BLD: 209 /CUMM (ref 130–400)
PLATELET # BLD: 216 /CUMM (ref 130–400)
PMV BLD AUTO: 7.5 FL (ref 7.4–10.4)
PMV BLD AUTO: 7.7 FL (ref 7.4–10.4)
RED BLOOD CELL CT: 2.3 /CUMM (ref 4.2–5.4)
RED BLOOD CELL CT: 2.41 /CUMM (ref 4.2–5.4)
WBC # BLD AUTO: 10.6 /CUMM (ref 4.8–10.8)
WBC # BLD AUTO: 11.4 /CUMM (ref 4.8–10.8)

## 2018-02-04 NOTE — PN- HOUSESTAFF
Bhumika Mattson 18 1052:
Subjective
Follow-up For:
back pain/weakness
hepatomegaly, liver mass, ascites
Subjective:
no complaints this morning, no abdominal pain, patient wants to go to [facility], 
refused MRI as an inpatient
 
Review of Systems
Constitutional:
Reports: no symptoms. 
 
Objective
Last 24 Hrs of Vital Signs/I&O
 Vital Signs
 
 
Date Time Temp Pulse Resp B/P B/P Pulse O2 O2 Flow FiO2
 
     Mean Ox Delivery Rate 
 
 0830 98.7 60 18 180/63     
 
 0829 98.7 60 18 138/64     
 
 0825 98.7 60 18 138/64     
 
 0706 98.7 60 18 138/64  97 Room Air  
 
 0000      97 Room Air  
 
 2245 98.1 65 18 148/60  97 Room Air  
 
 205  65  148/60     
 
 2054  65  148/60     
 
 1638  62  115/60     
 
 1600       Room Air  
 
 1424 98.6 62 18 115/60  96 Room Air  
 
 
 Intake & Output
 
 
  1600  0800  0000
 
Intake Total  0 150
 
Output Total   
 
Balance  0 150
 
    
 
Intake, IV  0 10
 
Intake, Oral  0 140
 
Number  1 
 
Bowel   
 
Movements   
 
 
Physical Exam
General Appearance: Alert, Oriented X3, Cooperative, No Acute Distress
Cardiovascular: Regular Rate, Normal S1, Normal S2, No Murmurs
Lungs: Clear to Auscultation, Normal Air Movement
Abdomen: Normal Bowel Sounds, Soft, distended but soft, firm hepatomegaly, no 
tenderness on palpation
Extremities: No Clubbing, No Cyanosis, minimal RLE edema
 
Physical Exam
General Appearance: Alert
 
Assessment/Plan
Assessment:
77-year-old female with past medical history of CAD status post CABG (), PVD
(status post angioplasty with stent placement), HTN,stage IV chronic kidney 
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 
uterine cancer, colon cancer was admitted acute on chronic back pain, associated
with lower extremity weakness, gait instability, and frequent falls.  The 
patient was found to have hepatomegaly and ascites on admission and underwent 
diagnostic and therapeutic paracentesis on this hospitalization.
 
Acute on chronic back pain: nonradicular, with new weakness, difficulty 
ambulating and falls 
 History of L4 laminectomy in past (Dr. Rodriguez)
 History of neuroforaminal narrowing with possible spinal stenosis. 
 Continue analgesia with Dilaudid/oxycodone/Tylenol.
 Discussed with CM and as per CM [facility] services mentioned that they are familiar 
with pt and are not comfortable providing services given her risk of falls. Will
follow PT and CM in regards to dispo planning. 
       
Ascites with liver mass:
 CT reports enlarging liver mass 8cm x 8cm x 8cm compared to 2.2cm in 2016
 MRI abdomen to further evaluate liver mass tomorrow
 Likely malignancy, patient refused MRI today
 History of colon cancer in past (resected ). 
 High SAAG suggests portal hypertension, can not rule out malignant ascites
 Follow up cytology and alpha fetoprotein
 GI consulted, appreciate recommendations
 Outpatient GI follow up for further testing
 
CAD/HTN:
 Continue betablocker, nitrates, hydralazine, and clonidine
 
HLD:
 Statin on hold
 Trend LFTs
 
GERD: Continue omeprazole
 
CKD stage IIIA: stable
 Avoid nephrotoxins
 
Heart healthy diet
DVT ppx-heparin 5000 units subcutaneous Q8H
Full code
Problem List:
 1. Liver lesion
 
 2. Ascites
 
 3. Intractable back pain
 
Pain Ratin
Pain Location:
low back
Pain Goal: Pain 4 or less
Pain Plan:
as per paion plan
Tomorrow's Labs & Rationales:
none
 
 
Deja PINTO,B 18 1219:
Attending MD Review Statement
 
Attending Statement
Attending MD Statement: examined this patient, discuss w/resident/PA/NP, agreed 
w/resident/PA/NP, discussed with nursing, discussed with case mgmt
Attending Assessment/Plan:
76 y/o F presented with acute back pain and was found to have ascites s/p para, 
neg for SBP. Ct scan was s/f for large hepatic mass.
Hepatic mass with the concern of malignancy, pt refusing MRI in house as she is 
claustrophobic- plan is for MRI and GI follow up as outpt for further management
 
Drop in H.H: no active source of bleeding. check FOBT. rpt H.h, type and screen.
Consult GI if positive H.H. 
generalized weakness: Insurance refused STR. pt is very weak and at risk of fall
at home. Discussed with CM and as per CM [facility] services mentioned that they are 
familiar with pt and are not comfortable providing services given her risk of 
falls. Will follow PT and CM in regards to dispo planning.

## 2018-02-05 VITALS — SYSTOLIC BLOOD PRESSURE: 178 MMHG | DIASTOLIC BLOOD PRESSURE: 60 MMHG

## 2018-02-05 VITALS — DIASTOLIC BLOOD PRESSURE: 60 MMHG | SYSTOLIC BLOOD PRESSURE: 140 MMHG

## 2018-02-05 VITALS — SYSTOLIC BLOOD PRESSURE: 154 MMHG | DIASTOLIC BLOOD PRESSURE: 74 MMHG

## 2018-02-05 LAB
ABSOLUTE GRANULOCYTE CT: 12.2 /CUMM (ref 1.4–6.5)
BASOPHILS # BLD: 0 /CUMM (ref 0–0.2)
BASOPHILS NFR BLD: 0.1 % (ref 0–2)
EOSINOPHIL # BLD: 0 /CUMM (ref 0–0.7)
EOSINOPHIL NFR BLD: 0.1 % (ref 0–5)
ERYTHROCYTE [DISTWIDTH] IN BLOOD BY AUTOMATED COUNT: 15.2 % (ref 11.5–14.5)
GRANULOCYTES NFR BLD: 94.7 % (ref 42.2–75.2)
HCT VFR BLD CALC: 23.9 % (ref 37–47)
LYMPHOCYTES # BLD: 0.3 /CUMM (ref 1.2–3.4)
MCH RBC QN AUTO: 30.9 PG (ref 27–31)
MCHC RBC AUTO-ENTMCNC: 33.7 G/DL (ref 33–37)
MCV RBC AUTO: 91.7 FL (ref 81–99)
MONOCYTES # BLD: 0.3 /CUMM (ref 0.1–0.6)
PLATELET # BLD: 252 /CUMM (ref 130–400)
PMV BLD AUTO: 7.7 FL (ref 7.4–10.4)
RED BLOOD CELL CT: 2.61 /CUMM (ref 4.2–5.4)
WBC # BLD AUTO: 12.9 /CUMM (ref 4.8–10.8)

## 2018-02-05 NOTE — PN- HOUSESTAFF
Drake Perez 18 0758:
Subjective
Follow-up For:
back pain/weakness
hepatomegaly, liver mass, ascites
Subjective:
Patient reports her R breast looks and feels different than her L breast. No 
acute events overnight
 
Review of Systems
Constitutional:
Reports: see HPI. 
 
Objective
Last 24 Hrs of Vital Signs/I&O
 Vital Signs
 
 
Date Time Temp Pulse Resp B/P B/P Pulse O2 O2 Flow FiO2
 
     Mean Ox Delivery Rate 
 
 0711 98.5 55 20 154/74  96 Room Air  
 
 2232 98.8 66 20 150/68  95 Room Air  
 
/ 1613 97.7 52 20 180/60     
 
 1500    180/60     
 
 1430 97.7 52 20 118/68  96   
 
 
 Intake & Output
 
 
  1600  0800  0000
 
Intake Total  250 260
 
Output Total   
 
Balance  250 260
 
    
 
Intake, IV  10 20
 
Intake, Oral  240 240
 
Number   1
 
Bowel   
 
Movements   
 
 
 
 
Physical Exam
General Appearance: Alert, Oriented X3, Cooperative
Skin: Multiple surgical scars
Cardiovascular: Regular Rate, Normal S1, Normal S2
Lungs: Clear to Auscultation, Normal Air Movement
Abdomen: Anterior thoracic wall hernia, Positive fluid wave 
Extremities: No Edema, thin
Breasts: Fluid filled L breast 
Other Physical Findings:
L side back appear to be 3rd spacing with fluid subcutaneously
Current Medications:
 Current Medications
 
 
  Sig/Jose E Start time  Last
 
Medication Dose Route Stop Time Status Admin
 
Acetaminophen 500 MG Q6P PRN  1815 AC 
 
  PO   2129
 
Amlodipine Besylate 10 MG DAILY  1000 AC 
 
  PO   0829
 
Atorvastatin Calcium 20 MG AT BEDTIME 0 AC 
 
  PO   214
 
Bisacodyl 5 MG ONE ONE  1815 DC 
 
  PO  1816  
 
Cilostazol 50 MG BID  2200 AC 
 
  PO   2143
 
Clonidine 0.2 MG BID  2200 AC 
 
  PO   2142
 
Hydralazine HCl 75 MG TID  1600 AC 
 
  PO   2142
 
Hydrocodone Bitart/ 1 TAB Q8P PRN  1800 AC 
 
Acetaminophen  PO   
 
Isosorbide  60 MG DAILY  1000 AC 
 
Mononitrate  PO   0829
 
Lidocaine 1 PAT DAILY  1815 AC 
 
  EXT   0824
 
Metoprolol Succinate 50 MG DAILY  1000 AC 
 
  PO   0825
 
Oxycodone/ 1 TAB Q8P PRN  1800 AC 
 
Acetaminophen  PO   0621
 
Polyethylene Glycol 17 GM BID  1000 AC 
 
  PO   0831
 
Prednisone 10 MG DAILY  1523 AC 
 
  PO   0825
 
Tramadol HCl 50 MG ONE TIME ONE 2200 DC 
 
  PO 2201  2216
 
 
 
 
Last 24 Hrs of Lab/Lio Results
Last 24 Hrs of Labs/Mics:
 Laboratory Tests
 
18:
CBC w Diff NO MAN DIFF REQ, RBC 2.41  L, MCV 91.0, MCH 30.7, MCHC 33.7, RDW 15.1
 H, MPV 7.5, Gran % 91.6  H, Lymphocytes % 4.8  L, Monocytes % 3.6, Eosinophils 
% 0, Basophils % 0, Absolute Granulocytes 10.5  H, Absolute Lymphocytes 0.6  L, 
Absolute Monocytes 0.4, Absolute Eosinophils 0, Absolute Basophils 0
 
18 09:
Anion Gap 11, Estimated GFR 31  L, BUN/Creatinine Ratio 38.8  H, CBC w Diff NO 
MAN DIFF REQ, RBC 2.30  L, MCV 92.3, MCH 30.9, MCHC 33.5, RDW 15.2  H, MPV 7.7, 
Gran % 87.3  H, Lymphocytes % 6.2  L, Monocytes % 6.2, Eosinophils % 0.3, 
Basophils % 0, Absolute Granulocytes 9.3  H, Absolute Lymphocytes 0.7  L, 
Absolute Monocytes 0.7  H, Absolute Eosinophils 0, Absolute Basophils 0
 
 
Assessment/Plan
Assessment:
77-year-old female with past medical history of CAD status post CABG (), PVD
(status post angioplasty with stent placement), HTN,stage IV chronic kidney 
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 
uterine cancer, colon cancer was admitted acute on chronic back pain, associated
with lower extremity weakness, gait instability, and frequent falls.  The 
patient was found to have hepatomegaly and ascites on admission and underwent 
diagnostic and therapeutic paracentesis on this hospitalization.
 
Acute on chronic back pain: nonradicular, with new weakness, difficulty 
ambulating and falls 
 History of L4 laminectomy in past (Dr. Rodriguez)
 History of neuroforaminal narrowing with possible spinal stenosis. 
 Continue analgesia with Dilaudid/oxycodone/Tylenol.
 Discussed with CM and as per CM [facility] services mentioned that they are familiar 
with pt and are not comfortable providing services given her risk of falls. Will
follow PT and CM in regards to dispo planning. 
       
Ascites with liver mass:
 CT reports enlarging liver mass 8cm x 8cm x 8cm compared to 2.2cm in 2016
 Likely malignancy, patient refused MRI 
 History of colon cancer in past (resected ). 
 High SAAG suggests portal hypertension, can not rule out malignant ascites
 Follow up cytology and alpha fetoprotein
 GI consulted, appreciate recommendations
 Outpatient GI follow up for further testing
 
CAD/HTN:
 Continue betablocker, nitrates, hydralazine, and clonidine
 
HLD:
 Statin on hold
 Trend LFTs
 
GERD: Continue omeprazole
 
CKD stage IIIA: stable
 Avoid nephrotoxins
 
Heart healthy diet
DVT ppx-heparin 5000 units subcutaneous Q8H
Full code
Problem List:
 1. Intractable back pain
 
Pain Ratin
Pain Location:
Back
Pain Goal: Remain pain free
Pain Plan:
Hyrdocodone, Oxycodone
Tomorrow's Labs & Rationales:
CBC for hgb
 
 
James Camp MD 18 1323:
Attending MD Review Statement
 
Attending Statement
Attending MD Statement: examined this patient, discuss w/resident/PA/NP, agreed 
w/resident/PA/NP, reviewed EMR data (avail)
Attending Assessment/Plan:
77F PMH CAD status post CABG (), PVD (status post angioplasty with stent 
placement), HTN,stage IV chronic kidney disease secondary to hypertensive 
nephrosclerosis, HLD, lumbar disc surgery, uterine cancer, colon cancer admitted
with acute onset of lower back pain with lower extremity weakness, as her legs 
just gave out.  Happens to patient about once a year, improved during admission 
with physical therapy and pain control.  Was found incidentally to have ascites 
now s/p paracentesis removing 6L, with ascitic fluid showing no evidence of SBP 
and consistent with cirrhosis/hepatitis.  CT of the abdomen shows large mass and
requires MRI, but patient is claustrophobic and will have outpatient open MRI.
 
Doing well today.  Reports worsening swelling of her left side and left breast 
without pain or erythema, non-tender on exam, consistent with dependent edema as
she lays and sleeps on that side.  Unable to transfer or climb stairs in her 
current condition.
 
1. Ascites, very likely to be malignant given hepatic mass and history of 
multiple malignancies
2. Acute on chronic lower back pain
3. Bilateral lower extremity weakness
 
Plan
- Continue on general mediicne
- Continue home medications
- Follow GI recommendations
- Continue home Prednisone
- Monitor edema
- Continue to work with PT as patient is deteriorating
- Would benefit from short term rehab

## 2018-02-06 VITALS — DIASTOLIC BLOOD PRESSURE: 62 MMHG | SYSTOLIC BLOOD PRESSURE: 148 MMHG

## 2018-02-06 VITALS — SYSTOLIC BLOOD PRESSURE: 100 MMHG | DIASTOLIC BLOOD PRESSURE: 60 MMHG

## 2018-02-06 VITALS — SYSTOLIC BLOOD PRESSURE: 160 MMHG | DIASTOLIC BLOOD PRESSURE: 60 MMHG

## 2018-02-06 NOTE — PN- HOUSESTAFF
**See Addendum**
Subjective
Follow-up For:
Chronic back pain
Subjective:
Pt seen and examined, currently resting comfortably, offers no complains. states
michael back pain is better than baseline.
 
Review of Systems
Constitutional:
Reports: see HPI. 
 
Objective
Last 24 Hrs of Vital Signs/I&O
 Vital Signs
 
 
Date Time Temp Pulse Resp B/P B/P Pulse O2 O2 Flow FiO2
 
     Mean Ox Delivery Rate 
 
0845 98.2 62 20 160/60     
 
 0844 98.2 62 20 160/60     
 
/ 0609 98.2 62 20 160/60  96 Room Air  
 
 0000      97 Room Air  
 
 2229 98.1 80 20 178/60  97 Room Air  
 
 2050  80  178/60     
 
 2049  80  178/60     
 
 1550  66  150/58     
 
 1509 98.1 62 16 140/60  98   
 
 
 Intake & Output
 
 
  1600  0800  0000
 
Intake Total  0 800
 
Output Total   
 
Balance  0 800
 
    
 
Intake, IV  0 
 
Intake, Oral  0 800
 
Number  0 
 
Bowel   
 
Movements   
 
 
 
 
Physical Exam
General Appearance: Alert, Oriented X3, Cooperative, No Acute Distress
Cardiovascular: Normal S1, Normal S2
Lungs: Clear to Auscultation
Abdomen: Normal Bowel Sounds, distended
Neurological: Normal Speech
Current Medications:
 Current Medications
 
 
  Sig/Jose E Start time  Last
 
Medication Dose Route Stop Time Status Admin
 
Acetaminophen 500 MG Q6P PRN  1815 AC 
 
  PO   2129
 
Amlodipine Besylate 10 MG DAILY  1000 AC 
 
  PO   0845
 
Atorvastatin Calcium 20 MG AT BEDTIME 2200 AC 
 
  PO   2049
 
Bisacodyl 10 MG ONCE ONE  1200 DC 
 
  DC  1201  
 
Cilostazol 50 MG BID  2200 AC 
 
  PO   0847
 
Clonidine 0.2 MG BID  220 AC 
 
  PO   0846
 
Hydralazine HCl 75 MG TID  1600 AC 
 
  PO   0846
 
Hydrocodone Bitart/ 1 TAB Q8P PRN  1800 DC 
 
Acetaminophen  PO   
 
Isosorbide  60 MG DAILY  1000 AC 
 
Mononitrate  PO   0846
 
Lidocaine 1 PAT DAILY  1815 AC 
 
  EXT   0847
 
Metoprolol Succinate 50 MG DAILY  1000 AC 
 
  PO   0844
 
Oxycodone/ 1 TAB Q8P PRN  1800 DC 
 
Acetaminophen  PO   0621
 
Polyethylene Glycol 17 GM BID  1000 AC 
 
  PO   0846
 
Prednisone 10 MG DAILY  1523 AC 
 
  PO   0844
 
Tramadol HCl 50 MG ONE TIME ONE  2100 DC 
 
  PO  2101  2154
 
 
 
 
Last 24 Hrs of Lab/Lio Results
Last 24 Hrs of Labs/Mics:
 Laboratory Tests
 
18 1457:
Anion Gap 12, Estimated GFR 29  L, BUN/Creatinine Ratio 38.8  H, CBC w Diff NO 
MAN DIFF REQ, RBC 2.61  L, MCV 91.7, MCH 30.9, MCHC 33.7, RDW 15.2  H, MPV 7.7, 
Gran % 94.7  H, Lymphocytes % 2.5  L, Monocytes % 2.6, Eosinophils % 0.1, 
Basophils % 0.1, Absolute Granulocytes 12.2  H, Absolute Lymphocytes 0.3  L, 
Absolute Monocytes 0.3, Absolute Eosinophils 0, Absolute Basophils 0
 
 
Assessment/Plan
Assessment:
77-year-old female with past medical history of CAD status post CABG (), PVD
(status post angioplasty with stent placement), HTN,stage IV chronic kidney 
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 
uterine cancer, colon cancer was admitted acute on chronic back pain, associated
with lower extremity weakness, gait instability, and frequent falls.  The 
patient was found to have hepatomegaly and ascites on admission and underwent 
diagnostic and therapeutic paracentesis on this hospitalization.
 
#Acute on chronic back pain: nonradicular, with new weakness, difficulty 
ambulating and falls 
 History of L4 laminectomy in past (Dr. Rodriguez)
 History of neuroforaminal narrowing with possible spinal stenosis. 
 Continue analgesia with Dilaudid/oxycodone/Tylenol.
          Awaiting STR, CM on case
 
#Ascites with liver mass:
 CT reports enlarging liver mass 8cm x 8cm x 8cm compared to 2.2cm in 2016
 Patient refusing MRI
 History of colon cancer in past (resected ). 
 High SAAG suggests portal hypertension, can not rule out malignant ascites
          Cytology showed rare atypical cells of undetermined significance, 
mesothelial cells
          histocytes, lymphocytes, neutrophils and RBCs. features of malignancy 
not identified
          Alpha fetoprotein,CEA, AMA, ASMA pending
 GI consulted, appreciate recommendations
 Outpatient GI follow up
 
#Stage 4 CKD secondary to hypertensive nephrosclerosis
 Creatinine 1.7 potassium 5.4
 Continue to monitor BEP
 Avoid nephrotoxins
 Outpatient follow-up with PCP for BEP monitoring
 
CAD/HTN: Continue betablocker, nitrates, hydralazine, and clonidine
 
HLD:Statin on hold,Trend LFTs
 
GERD: Continue omeprazole
 
CKD stage IIIA: stable
 
 
Heart healthy diet
DVT ppx-heparin 5000 units subcutaneous Q8H
Full code
Problem List:
 1. Liver lesion
 
 2. Ascites
 
Pain Ratin
Pain Location:
BACK
Pain Goal: Pain 4 or less
Pain Plan:
PRN
Tomorrow's Labs & Rationales:
AS ORDERED

## 2018-02-07 VITALS — DIASTOLIC BLOOD PRESSURE: 60 MMHG | SYSTOLIC BLOOD PRESSURE: 128 MMHG

## 2018-02-07 VITALS — SYSTOLIC BLOOD PRESSURE: 170 MMHG | DIASTOLIC BLOOD PRESSURE: 70 MMHG

## 2018-02-07 VITALS — DIASTOLIC BLOOD PRESSURE: 68 MMHG | SYSTOLIC BLOOD PRESSURE: 154 MMHG

## 2018-02-07 LAB
ABSOLUTE GRANULOCYTE CT: 11.1 /CUMM (ref 1.4–6.5)
BASOPHILS # BLD: 0 /CUMM (ref 0–0.2)
BASOPHILS NFR BLD: 0 % (ref 0–2)
EOSINOPHIL # BLD: 0 /CUMM (ref 0–0.7)
EOSINOPHIL NFR BLD: 0.2 % (ref 0–5)
ERYTHROCYTE [DISTWIDTH] IN BLOOD BY AUTOMATED COUNT: 15.5 % (ref 11.5–14.5)
GRANULOCYTES NFR BLD: 93.5 % (ref 42.2–75.2)
HCT VFR BLD CALC: 22.5 % (ref 37–47)
LYMPHOCYTES # BLD: 0.4 /CUMM (ref 1.2–3.4)
MCH RBC QN AUTO: 30.6 PG (ref 27–31)
MCHC RBC AUTO-ENTMCNC: 33.3 G/DL (ref 33–37)
MCV RBC AUTO: 91.9 FL (ref 81–99)
MONOCYTES # BLD: 0.4 /CUMM (ref 0.1–0.6)
PLATELET # BLD: 245 /CUMM (ref 130–400)
PMV BLD AUTO: 7.8 FL (ref 7.4–10.4)
RED BLOOD CELL CT: 2.45 /CUMM (ref 4.2–5.4)
WBC # BLD AUTO: 11.8 /CUMM (ref 4.8–10.8)

## 2018-02-07 NOTE — PN- GASTROENTEROLOGY
Assessment/Plan
Assessment/Recommendations:
ASSESSMENT:
1.  New onset ascites.  Await cytology.
2.  Lesion in right lobe of the liver unlikely primary hepatocellular carcinoma 
given AFP of 1.  Must consider metastatic disease given history of endometrial 
cancer as well as colon cancer.  Patient not interested in repeat colonoscopy.  
Can be done as outpatient.  
3.  Back pain
4.  Chronic anemia of long duration
5.  Chronic constipation.  CT scan of the abdomen and pelvis shows large stool 
burden, now on BID Miralax.
 
RECOMMENDATIONS:
1.  Await results CEA 
2.   Patient unable to tolerate closed MRI and awaiting study at open MRI.
3.  Would check AMA and ASMA as patient may have burned out autoimmune hepatitis
to account for portal hypertension. 
4. Patient to be discharged to rehab facility and will have open MRI as an 
outpatient from rehab.  Make sure that patient has follow up appointment with me
in the office.  Patient has voiced understanding that she will follow up with 
me.
5.  GI will sign off for now.  Please do not hesitate to contact us as needed. 
 
 
Subjective
Subjective:
Patient OOB to chair.  Concerned about the fact that she is unable to get OOB on
her own.  Reports she has not had a colonoscopy for many years.  Is unclear 
about the facts of her prior colon cancer.  No nausea, vomiting or abdominal 
pain.
 
Objective
Vital Signs and I&Os
Vital Signs
 
 
Date Time Temp Pulse Resp B/P B/P Pulse O2 O2 Flow FiO2
 
     Mean Ox Delivery Rate 
 
02/07 1023  60  190/70     
 
02/07 0926       Room Air Room Air 
 
02/07 0746 98.1 74 20 154/68  96 Room Air  
 
02/06 2208 98.8 63 20 148/62  97   
 
02/06 2148 98.5 63  100/60     
 
02/06 1357 98.5 63 20 100/60  96   
 
 
 Intake & Output
 
 
 02/07 1600 02/07 0400 02/06 1600 02/06 0400 02/05 1600 02/05 0400
 
Intake Total 200 200 340 800 490 260
 
Output Total     1 
 
Balance 200 200 340 800 489 260
 
       
 
Intake, IV   0  10 20
 
Intake, Oral 200 200 340 800 480 240
 
Number   0  2 1
 
Bowel      
 
Movements      
 
Output, Stool     1 
 
 
 
 
Physical Exam
General Appearance: alert, awake, comfortable
Respiratory: normal breath sounds, lungs clear
Cardiovascular: regular rate/rhythm
Abdomen: normal bowel sounds, non-tender, no organomegaly
Neurologic/Psychiatric: oriented x 3, normal mood/affect
Skin: normal color, warm/dry
Current Medications:
 Current Medications
 
 
  Sig/Jose E Start time  Last
 
Medication Dose Route Stop Time Status Admin
 
Acetaminophen 500 MG Q6P PRN 01/29 1815 AC 01/30
 
  PO   2129
 
Amlodipine Besylate 10 MG DAILY 01/30 1000 AC 02/07
 
  PO   1024
 
Atorvastatin Calcium 20 MG AT BEDTIME 01/29 2200 AC 02/06
 
  PO   2147
 
Cilostazol 50 MG BID 01/29 2200 AC 02/07
 
  PO   1024
 
Clonidine 0.2 MG BID 01/29 2200 AC 02/07
 
  PO   1024
 
Hydralazine HCl 75 MG TID 02/01 1600 AC 02/07
 
  PO   1023
 
Isosorbide  60 MG DAILY 01/30 1000 AC 02/07
 
Mononitrate  PO   1023
 
Lidocaine 1 PAT DAILY 01/29 1815 AC 02/06
 
  EXT   0847
 
Metoprolol Succinate 50 MG DAILY 01/30 1000 AC 02/07
 
  PO   1024
 
Patient Medication  1 ED ONE ONE 02/06 1245 DC 02/07
 
Teaching  ED 02/06 1246  0702
 
Polyethylene Glycol 17 GM BID 01/31 1000 AC 02/07
 
  PO   1024
 
Prednisone 10 MG DAILY 01/30 1523 AC 02/07
 
  PO   1024
 
Tramadol HCl 50 MG ONE TIME ONE 02/06 2330 DC 02/06
 
  PO 02/06 2331  2337
 
 
 
 
Results
Pertinent Lab Results:
 Laboratory Tests
 
 
 02/05 02/05
 
 1457 0600
 
Chemistry  
 
  Sodium (137 - 145 mmol/L) 135  L 
 
  Potassium (3.5 - 5.1 mmol/L) 5.4  H 
 
  Chloride (98 - 107 mmol/L) 100 
 
  Carbon Dioxide (22 - 30 mmol/L) 23 
 
  Anion Gap (5 - 16) 12 
 
  BUN (7 - 17 mg/dL) 66  H 
 
  Creatinine (0.5 - 1.0 mg/dL) 1.7  H 
 
  Estimated GFR (>60 ml/min) 29  L 
 
  BUN/Creatinine Ratio (7 - 25 %) 38.8  H 
 
  Carcinoembryonic Ag  Pending
 
Hematology  
 
  CBC w Diff NO MAN DIFF REQ 
 
  WBC (4.8 - 10.8 /CUMM) 12.9  H 
 
  RBC (4.20 - 5.40 /CUMM) 2.61  L 
 
  Hgb (12.0 - 16.0 G/DL) 8.1  L 
 
  Hct (37 - 47 %) 23.9  L 
 
  MCV (81.0 - 99.0 FL) 91.7 
 
  MCH (27.0 - 31.0 PG) 30.9 
 
  MCHC (33.0 - 37.0 G/DL) 33.7 
 
  RDW (11.5 - 14.5 %) 15.2  H 
 
  Plt Count (130 - 400 /CUMM) 252 
 
  MPV (7.4 - 10.4 FL) 7.7 
 
  Gran % (42.2 - 75.2 %) 94.7  H 
 
  Lymphocytes % (20.5 - 51.1 %) 2.5  L 
 
  Monocytes % (1.7 - 9.3 %) 2.6 
 
  Eosinophils % (0 - 5 %) 0.1 
 
  Basophils % (0.0 - 2.0 %) 0.1 
 
  Absolute Granulocytes (1.4 - 6.5 /CUMM) 12.2  H 
 
  Absolute Lymphocytes (1.2 - 3.4 /CUMM) 0.3  L 
 
  Absolute Monocytes (0.10 - 0.60 /CUMM) 0.3 
 
  Absolute Eosinophils (0.0 - 0.7 /CUMM) 0 
 
  Absolute Basophils (0.0 - 0.2 /CUMM) 0 
 
Immunology  
 
  Anti-Mitochondrial Ab  Pending
 
  Anti-Smooth Muscle Ab  Pending
 
 
 
 
 02/04 2250
 
Hematology 
 
  CBC w Diff NO MAN DIFF REQ
 
  WBC (4.8 - 10.8 /CUMM) 11.4  H
 
  RBC (4.20 - 5.40 /CUMM) 2.41  L
 
  Hgb (12.0 - 16.0 G/DL) 7.4 *L
 
  Hct (37 - 47 %) 21.9  L
 
  MCV (81.0 - 99.0 FL) 91.0
 
  MCH (27.0 - 31.0 PG) 30.7
 
  MCHC (33.0 - 37.0 G/DL) 33.7
 
  RDW (11.5 - 14.5 %) 15.1  H
 
  Plt Count (130 - 400 /CUMM) 216
 
  MPV (7.4 - 10.4 FL) 7.5
 
  Gran % (42.2 - 75.2 %) 91.6  H
 
  Lymphocytes % (20.5 - 51.1 %) 4.8  L
 
  Monocytes % (1.7 - 9.3 %) 3.6
 
  Eosinophils % (0 - 5 %) 0
 
  Basophils % (0.0 - 2.0 %) 0
 
  Absolute Granulocytes (1.4 - 6.5 /CUMM) 10.5  H
 
  Absolute Lymphocytes (1.2 - 3.4 /CUMM) 0.6  L
 
  Absolute Monocytes (0.10 - 0.60 /CUMM) 0.4
 
  Absolute Eosinophils (0.0 - 0.7 /CUMM) 0
 
  Absolute Basophils (0.0 - 0.2 /CUMM) 0

## 2018-02-07 NOTE — PN- HOUSESTAFF
Alla PINTO,Jamal 18 0914:
Subjective
Follow-up For:
liver mass/cirrhosis
 
Subjective:
no new complaints
 
Review of Systems
Constitutional:
Reports: see HPI. 
 
Objective
Last 24 Hrs of Vital Signs/I&O
 Vital Signs
 
 
Date Time Temp Pulse Resp B/P B/P Pulse O2 O2 Flow FiO2
 
     Mean Ox Delivery Rate 
 
 1600  72  140/70     
 
 1445    170/70     
 
 1438 98.7 98 20   96   
 
 1023  60  190/70     
 
 0926       Room Air Room Air 
 
 0746 98.1 74 20 154/68  96 Room Air  
 
 2208 98.8 63 20 148/62  97   
 
 2148 98.5 63  100/60     
 
 
 Intake & Output
 
 
  1600  0800  0000
 
Intake Total 480 200 200
 
Output Total   
 
Balance 480 200 200
 
    
 
Intake, Oral 480 200 200
 
 
 
 
Physical Exam
General Appearance: Alert, Oriented X3, Cooperative, No Acute Distress
Cardiovascular: Regular Rate, Normal S1, Normal S2, No Murmurs
Lungs: diminished bibasilar
Abdomen: Normal Bowel Sounds, Soft, No Tenderness, hepatomegaly
Extremities: No Clubbing, No Edema, Normal Pulses
Current Medications:
 Current Medications
 
 
  Sig/Jose E Start time  Last
 
Medication Dose Route Stop Time Status Admin
 
Acetaminophen 500 MG Q6P PRN  181 AC 
 
  PO   2129
 
Amlodipine Besylate 10 MG DAILY  1000 AC 
 
  PO   1024
 
Atorvastatin Calcium 20 MG AT BEDTIME 2200 AC 
 
  PO   2147
 
Cilostazol 50 MG BID  220 AC 
 
  PO   1024
 
Clonidine 0.2 MG BID  220 AC 
 
  PO   1024
 
Hydralazine HCl 75 MG TID  1600 AC 
 
  PO   1600
 
Isosorbide  60 MG DAILY  1000 AC 
 
Mononitrate  PO   1023
 
Lidocaine 1 PAT DAILY  181 AC 
 
  EXT   0847
 
Metoprolol Succinate 50 MG DAILY  1000 AC 
 
  PO   1024
 
Polyethylene Glycol 17 GM BID  1000 AC 
 
  PO   1024
 
Prednisone 10 MG DAILY  1523 AC 
 
  PO   1024
 
Tramadol HCl 50 MG AT BEDTIME 2200 AC 
 
  PO   2015
 
Tramadol HCl 50 MG ONE TIME ONE  2330 DC 
 
  PO  2331  2337
 
 
 
 
Last 24 Hrs of Lab/Lio Results
Last 24 Hrs of Labs/Mics:
 Laboratory Tests
 
18 1545:
Anion Gap 6, Estimated GFR 29  L, BUN/Creatinine Ratio 41.2  H, CBC w Diff NO 
MAN DIFF REQ, RBC 2.45  L, MCV 91.9, MCH 30.6, MCHC 33.3, RDW 15.5  H, MPV 7.8, 
Gran % 93.5  H, Lymphocytes % 3.2  L, Monocytes % 3.1, Eosinophils % 0.2, 
Basophils % 0, Absolute Granulocytes 11.1  H, Absolute Lymphocytes 0.4  L, 
Absolute Monocytes 0.4, Absolute Eosinophils 0, Absolute Basophils 0
 
 
Assessment/Plan
Assessment:
77-year-old female with past medical history of CAD status post CABG (), PVD
(status post angioplasty with stent placement), HTN,stage IV chronic kidney 
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 
uterine cancer, colon cancer was admitted acute on chronic back pain, associated
with lower extremity weakness, gait instability, and frequent falls.  The 
patient was found to have hepatomegaly and ascites on admission and underwent 
diagnostic and therapeutic paracentesis on this hospitalization.
 
#Acute on chronic back pain: nonradicular, with new weakness, difficulty 
ambulating and falls 
 History of L4 laminectomy in past (Dr. Rodriguez)
 History of neuroforaminal narrowing with possible spinal stenosis. 
 Continue analgesia with Dilaudid/oxycodone/Tylenol.
          Awaiting STR, CM on case
 
#Ascites with liver mass:
 CT reports enlarging liver mass 8cm x 8cm x 8cm compared to 2.2cm in 2016
 Patient refusing MRI
 History of colon cancer in past (resected ). 
 High SAAG suggests portal hypertension, can not rule out malignant ascites
          Cytology showed rare atypical cells of undetermined significance, 
mesothelial cells
          histocytes, lymphocytes, neutrophils and RBCs. features of malignancy 
not identified
          Alpha fetoprotein,CEA, AMA, ASMA pending
 GI consulted, appreciate recommendations
 Outpatient GI follow up
 
#Stage 4 CKD secondary to hypertensive nephrosclerosis
 Creatinine 1.7 potassium 5.4
 Continue to monitor BEP
 Avoid nephrotoxins
 Outpatient follow-up with PCP for BEP monitoring
 
CAD/HTN: Continue betablocker, nitrates, hydralazine, and clonidine
 
HLD:Statin on hold,Trend LFTs
 
GERD: Continue omeprazole
 
CKD stage IIIA: stable
 
 
Heart healthy diet
DVT ppx-heparin 5000 units subcutaneous Q8H
Full code
Problem List:
 1. History of colon cancer
 
 2. Anemia
 
 3. Acute on chronic renal failure
 
 4. Unsteady gait
 
 5. Ascites
 
 6. Liver lesion
 
Pain Ratin
Pain Location:
back
Pain Goal: Pain 4 or less
Pain Plan:
prn
Tomorrow's Labs & Rationales:
cbc, bep
 
 
James Camp MD 18 1343:
Attending MD Review Statement
 
Attending Statement
Attending MD Statement: examined this patient, discuss w/resident/PA/NP, agreed 
w/resident/PA/NP, reviewed EMR data (avail)
Attending Assessment/Plan:
77F PMH CAD status post CABG (), PVD (status post angioplasty with stent 
placement), HTN,stage IV chronic kidney disease secondary to hypertensive 
nephrosclerosis, HLD, lumbar disc surgery, uterine cancer, colon cancer admitted
with acute onset of lower back pain with lower extremity weakness, as her legs 
just gave out.  Happens to patient about once a year, improved during admission 
with physical therapy and pain control.  Was found incidentally to have ascites 
now s/p paracentesis removing 6L, with ascitic fluid showing no evidence of SBP 
and consistent with cirrhosis/hepatitis.  CT of the abdomen shows large mass and
requires MRI, but patient is claustrophobic and will have outpatient open MRI.
 
Doing well today.  Reports worsening swelling of her left side and left breast 
without pain or erythema, non-tender on exam, consistent with dependent edema as
she lays and sleeps on that side.  Unable to transfer or climb stairs in her 
current condition.
 
1. Ascites, very likely to be malignant given hepatic mass and history of 
multiple malignancies
2. Acute on chronic lower back pain
3. Bilateral lower extremity weakness
 
Plan
- Continue on general mediicne
- Continue home medications
- Follow GI recommendations
- Continue home Prednisone
- Monitor edema
- Continue to work with PT as patient is deteriorating
- Would benefit from short term rehab

## 2018-02-08 VITALS — DIASTOLIC BLOOD PRESSURE: 88 MMHG | SYSTOLIC BLOOD PRESSURE: 156 MMHG

## 2018-02-08 VITALS — SYSTOLIC BLOOD PRESSURE: 160 MMHG | DIASTOLIC BLOOD PRESSURE: 72 MMHG

## 2018-02-08 VITALS — DIASTOLIC BLOOD PRESSURE: 90 MMHG | SYSTOLIC BLOOD PRESSURE: 140 MMHG

## 2018-02-08 LAB
ABSOLUTE GRANULOCYTE CT: 12 /CUMM (ref 1.4–6.5)
BASOPHILS # BLD: 0 /CUMM (ref 0–0.2)
BASOPHILS NFR BLD: 0 % (ref 0–2)
EOSINOPHIL # BLD: 0 /CUMM (ref 0–0.7)
EOSINOPHIL NFR BLD: 0.2 % (ref 0–5)
ERYTHROCYTE [DISTWIDTH] IN BLOOD BY AUTOMATED COUNT: 15.9 % (ref 11.5–14.5)
GRANULOCYTES NFR BLD: 92.3 % (ref 42.2–75.2)
HCT VFR BLD CALC: 27.7 % (ref 37–47)
LYMPHOCYTES # BLD: 0.4 /CUMM (ref 1.2–3.4)
MCH RBC QN AUTO: 30.6 PG (ref 27–31)
MCHC RBC AUTO-ENTMCNC: 32.6 G/DL (ref 33–37)
MCV RBC AUTO: 93.8 FL (ref 81–99)
MONOCYTES # BLD: 0.6 /CUMM (ref 0.1–0.6)
PLATELET # BLD: 297 /CUMM (ref 130–400)
PMV BLD AUTO: 7.9 FL (ref 7.4–10.4)
RED BLOOD CELL CT: 2.96 /CUMM (ref 4.2–5.4)
WBC # BLD AUTO: 13 /CUMM (ref 4.8–10.8)

## 2018-02-08 NOTE — PN- HOUSESTAFF
Alla PINTO,Jamal 18 0648:
Subjective
Follow-up For:
liver mass/ascites
lower extremity weakness and back pain
Subjective:
no new complaints
 
Review of Systems
Constitutional:
Reports: see HPI. 
 
Objective
Last 24 Hrs of Vital Signs/I&O
 Vital Signs
 
 
Date Time Temp Pulse Resp B/P B/P Pulse O2 O2 Flow FiO2
 
     Mean Ox Delivery Rate 
 
 1339 97.9 94 20 140/90  93 Room Air  
 
/ 0955 98.5 67 20 160/72     
 
/08 0955 98.5 67 20 160/72     
 
/08 0955 98.5 67 20 160/72     
 
/08 0955 98.5 67 20 160/72     
 
02/08 0955 98.5 67 20 160/72     
 
/08 0734 98.5 67 20 160/72  97 Room Air  
 
 2232 98.2 66 18 128/60  95 Room Air  
 
/07 2122  66  128/60     
 
07 2033 98.2 66 18 128/60  95 Room Air  
 
07 1600       Room Air  
 
 1600  72  140/70     
 
 
 Intake & Output
 
 
  1600  0800  0000
 
Intake Total  130 250
 
Output Total   
 
Balance  130 250
 
    
 
Intake, IV  10 10
 
Intake, Oral  120 240
 
 
 
 
Physical Exam
General Appearance: Alert, Oriented X3, Cooperative, No Acute Distress
Cardiovascular: Regular Rate, Normal S1, Normal S2, No Murmurs, sternotomy scar
Lungs: Clear to Auscultation, Normal Air Movement
Abdomen: Normal Bowel Sounds, Soft, distended, nontender, hepatomegaly, 
epigastric hernia
Extremities: No Clubbing, No Cyanosis, No Edema, Normal Pulses
Current Medications:
 Current Medications
 
 
  Sig/Jose E Start time  Last
 
Medication Dose Route Stop Time Status Admin
 
Acetaminophen 500 MG Q6P PRN 1815 AC 
 
  PO   
 
Amlodipine Besylate 10 MG DAILY  1000 AC 
 
  PO   0955
 
Atorvastatin Calcium 20 MG AT BEDTIME 2200 AC 
 
  PO   212
 
Calcium Gluconate 1 GM ONCE  CAN 
 
Sodium Chloride 100 ML IV  1014  
 
Cilostazol 50 MG BID 2200 AC 
 
  PO   0956
 
Clonidine 0.2 MG BID 2200 AC 
 
  PO   0955
 
Hydralazine HCl 75 MG TID  1600 AC 
 
  PO   0955
 
Isosorbide  60 MG DAILY  1000 AC 
 
Mononitrate  PO   0955
 
Lidocaine 1 PAT DAILY  1815 AC 
 
  EXT   0847
 
Metoprolol Succinate 50 MG DAILY  1000 AC 
 
  PO   0955
 
Oxycodone HCl 5 MG ONE ONE  1115 DC 
 
  PO  1116  1125
 
Polyethylene Glycol 17 GM BID  1000 AC 
 
  PO   0956
 
Prednisone 10 MG DAILY  1523 AC 
 
  PO   1008
 
Sodium Polystyrene  60 ML ONCE  DC 
 
Sulfonate    0954
 
Tramadol HCl 50 MG AT BEDTIME 2200 AC 
 
  PO   
 
 
 
 
Last 24 Hrs of Lab/Lio Results
Last 24 Hrs of Labs/Mics:
 Laboratory Tests
 
18 1341:
Anion Gap 10, Estimated GFR 34  L, BUN/Creatinine Ratio 49.3  H, CBC w Diff NO 
MAN DIFF REQ, RBC 2.96  L, MCV 93.8, MCH 30.6, MCHC 32.6  L, RDW 15.9  H, MPV 
7.9, Gran % 92.3  H, Lymphocytes % 2.9  L, Monocytes % 4.6, Eosinophils % 0.2, 
Basophils % 0, Absolute Granulocytes 12.0  H, Absolute Lymphocytes 0.4  L, 
Absolute Monocytes 0.6, Absolute Eosinophils 0, Absolute Basophils 0
 
18 1545:
Anion Gap 6, Estimated GFR 29  L, BUN/Creatinine Ratio 41.2  H, CBC w Diff NO 
MAN DIFF REQ, RBC 2.45  L, MCV 91.9, MCH 30.6, MCHC 33.3, RDW 15.5  H, MPV 7.8, 
Gran % 93.5  H, Lymphocytes % 3.2  L, Monocytes % 3.1, Eosinophils % 0.2, 
Basophils % 0, Absolute Granulocytes 11.1  H, Absolute Lymphocytes 0.4  L, 
Absolute Monocytes 0.4, Absolute Eosinophils 0, Absolute Basophils 0
 
 
Assessment/Plan
Assessment:
77-year-old female with past medical history of CAD status post CABG (), PVD
(status post angioplasty with stent placement), HTN,stage IV chronic kidney 
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 
uterine cancer, colon cancer was admitted acute on chronic back pain, associated
with lower extremity weakness, gait instability, and frequent falls.  The 
patient was found to have hepatomegaly and ascites on admission and underwent 
diagnostic and therapeutic paracentesis on this hospitalization.
 
#Acute on chronic back pain: nonradicular, with new weakness, difficulty 
ambulating and falls 
 History of L4 laminectomy in past (Dr. Rodriguez)
 History of neuroforaminal narrowing with possible spinal stenosis. 
 Continue analgesia with Dilaudid/oxycodone/Tylenol.
          Awaiting STR, CM on case
 
#Ascites with liver mass:
 CT reports enlarging liver mass 8cm x 8cm x 8cm compared to 2.2cm in 2016
 Patient refusing MRI
 History of colon cancer in past (resected ). 
 High SAAG suggests portal hypertension, can not rule out malignant ascites
          Cytology showed rare atypical cells of undetermined significance, 
mesothelial cells
          histocytes, lymphocytes, neutrophils and RBCs. features of malignancy 
not identified
          Alpha fetoprotein,CEA, AMA, ASMA pending
 GI consulted, appreciate recommendations
 Outpatient GI follow up
 
#Stage 4 CKD secondary to hypertensive nephrosclerosis
 Creatinine 1.7 potassium 5.8 this morning
 Lactulose admininstered
 Avoid nephrotoxins
 
CAD/HTN: Continue betablocker, nitrates, hydralazine, and clonidine
 
HLD: Statin on hold,Trend LFTs
 
GERD: Continue omeprazole
 
CKD stage IIIA: stable
 
 
Heart healthy diet
DVT ppx-heparin 5000 units subcutaneous Q8H
Full code
Problem List:
 1. CAD (coronary artery disease)
 
 2. History of colon cancer
 
 3. History of uterine cancer
 
 4. Chronic renal insufficiency
 
 5. Ascites
 
 6. Liver lesion
 
 7. Intractable back pain
 
Pain Ratin
Pain Location:
lumbar back
Pain Goal: Pain 4 or less
Pain Plan:
prn
Tomorrow's Labs & Rationales:
James Tejada MD 18 1326:
Attending MD Review Statement
 
Attending Statement
Attending MD Statement: examined this patient, discuss w/resident/PA/NP, agreed 
w/resident/PA/NP, reviewed EMR data (avail)
Attending Assessment/Plan:
77F PMH CAD status post CABG (), PVD (status post angioplasty with stent 
placement), HTN,stage IV chronic kidney disease secondary to hypertensive 
nephrosclerosis, HLD, lumbar disc surgery, uterine cancer, colon cancer admitted
with acute onset of lower back pain with lower extremity weakness, as her legs 
just gave out.  Happens to patient about once a year, improved during admission 
with physical therapy and pain control.  Was found incidentally to have ascites 
now s/p paracentesis removing 6L, with ascitic fluid showing no evidence of SBP 
and consistent with cirrhosis/hepatitis.  CT of the abdomen shows large mass and
requires MRI, but patient is claustrophobic and will have outpatient open MRI.
 
Doing well today.  Left side and breast swelling is unchanged, no pain or 
discomfort.  Unable to transfer or climb stairs in her current condition.
 
1. Ascites, very likely to be malignant given hepatic mass and history of 
multiple malignancies
2. Acute on chronic lower back pain
3. Bilateral lower extremity weakness
 
Plan
- Continue on general mediicne
- Continue home medications
- Follow GI recommendations
- Continue home Prednisone
- Monitor edema
- Continue to work with PT as patient is deteriorating
- Would benefit from short term rehab

## 2018-02-09 VITALS — SYSTOLIC BLOOD PRESSURE: 130 MMHG | DIASTOLIC BLOOD PRESSURE: 80 MMHG

## 2018-02-09 VITALS — SYSTOLIC BLOOD PRESSURE: 142 MMHG | DIASTOLIC BLOOD PRESSURE: 80 MMHG

## 2018-02-09 VITALS — DIASTOLIC BLOOD PRESSURE: 80 MMHG | SYSTOLIC BLOOD PRESSURE: 130 MMHG

## 2018-02-09 NOTE — PN- HOUSESTAFF
Subjective
Follow-up For:
liver mass, ascites
chronic back pain, lower extremity weakness
Subjective:
patient has had frequent loose BMs overnight from being administered kayexalate 
for hyperkalemia
didnt sleep well overnight and reports not being in a good mood, tired and just 
wants to sleep
back pain is well controlled today
 
 
Review of Systems
Constitutional:
Reports: see HPI. 
 
Objective
Last 24 Hrs of Vital Signs/I&O
 Vital Signs
 
 
Date Time Temp Pulse Resp B/P B/P Pulse O2 O2 Flow FiO2
 
     Mean Ox Delivery Rate 
 
 1102       Room Air Room Air 
 
0949 98.6 70 16 21080     
 
0947 98.6 70 16 21043 98.6 70 16 21042 98.6 70 16 21040 98.6 70 16 210 0651 98.2 68 20 142/80  96 Room Air  
 
 2230 98.6 67 20 156/88  97 Room Air  
 
 2125  88  160/90     
 
 2124  88  160/90     
 
 1658  80  134/80     
 
 
 Intake & Output
 
 
  1600  0800  0000
 
Intake Total  130 400
 
Output Total   
 
Balance  130 400
 
    
 
Intake, IV  10 
 
Intake, Oral  120 400
 
Number  2 1
 
Bowel   
 
Movements   
 
 
 
 
Physical Exam
General Appearance: Alert, Oriented X3, Cooperative, No Acute Distress
Cardiovascular: Regular Rate, Normal S1, Normal S2, No Murmurs, thoracotomy scar
Lungs: diminished bibasilar
Abdomen: Normal Bowel Sounds, Soft, distended, hepatomegaly, non tender
Extremities: No Clubbing, No Cyanosis, R>L le edema
Current Medications:
 Current Medications
 
 
  Sig/Jose E Start time  Last
 
Medication Dose Route Stop Time Status Admin
 
Acetaminophen 500 MG Q6P PRN  181 AC 
 
  PO   
 
Amlodipine Besylate 10 MG DAILY  1000 AC 
 
  PO   0947
 
Atorvastatin Calcium 20 MG AT BEDTIME 2200 AC 
 
  PO   
 
Cilostazol 50 MG BID 2200 AC 
 
  PO   0948
 
Clonidine 0.2 MG BID 2200 AC 
 
  PO   0943
 
Hydralazine HCl 75 MG TID  1600 AC 
 
  PO   0940
 
Isosorbide  60 MG DAILY  1000 AC 
 
Mononitrate  PO   0942
 
Lidocaine 1 PAT DAILY  1815 AC 
 
  EXT   0847
 
Metoprolol Succinate 50 MG DAILY  1000 AC 
 
  PO   0949
 
Polyethylene Glycol 17 GM BID  1000 AC 
 
  PO   0946
 
Prednisone 10 MG DAILY  1523 AC 
 
  PO   0949
 
Tramadol HCl 50 MG AT BEDTIME  2200 AC 
 
  PO   2227
 
 
 
 
Assessment/Plan
Assessment:
77-year-old female with past medical history of CAD status post CABG (), PVD
(status post angioplasty with stent placement), HTN,stage IV chronic kidney 
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 
uterine cancer, colon cancer was admitted acute on chronic back pain, associated
with lower extremity weakness, gait instability, and frequent falls.  The 
patient was found to have hepatomegaly and ascites on admission and underwent 
diagnostic and therapeutic paracentesis on this hospitalization.
 
Acute on chronic back pain: nonradicular, with new weakness, difficulty 
ambulating and falls 
 History of L4 laminectomy in past (Dr. Rodriguez)
 History of neuroforaminal narrowing with possible spinal stenosis. 
 Continue analgesia with Dilaudid/oxycodone/Tylenol.
          Awaiting STR, denied by insurance, peer to peer
 
Ascites with liver mass:
 CT reports enlarging liver mass 8cm x 8cm x 8cm compared to 2.2cm in 2016
 Patient refusing MRI
 History of colon cancer in past (resected ). 
 High SAAG suggests portal hypertension, can not rule out malignant ascites
          Cytology showed rare atypical cells of undetermined significance, 
mesothelial cells
          histocytes, lymphocytes, neutrophils and RBCs. features of malignancy 
not identified
          Alpha fetoprotein negative, CEA, AMA, ASMA pending
 GI consulted, appreciate recommendations
 Outpatient GI follow up
 
Stage 4 CKD secondary to hypertensive nephrosclerosis
 Creatinine improve, hyperkalemia improved with kayexalate
 Lactulose admininstered
 Avoid nephrotoxins
 
CAD/HTN: Continue betablocker, nitrates, hydralazine, and clonidine
 
HLD: Statin on hold,Trend LFTs
 
GERD: Continue omeprazole
 
CKD stage IIIA: stable
 
Heart healthy diet
DVT ppx-heparin 5000 units subcutaneous Q8H
Full code
Problem List:
 1. CAD (coronary artery disease)
 
 2. Liver lesion
 
 3. Ascites
 
 4. Chronic renal insufficiency
 
 5. History of colon cancer
 
 6. History of uterine cancer
 
 7. Anemia
 
 8. Hypertension
 
 9. Unsteady gait
 
Pain Ratin
Pain Location:
lumbar back
Pain Goal: Pain 4 or less
Pain Plan:
prn
Tomorrow's Labs & Rationales:
bep

## 2018-02-09 NOTE — PN- ATT ADDEND
Attending Addendum
Attending Brief Note
77F PMH CAD status post CABG (2000), PVD (status post angioplasty with stent 
placement), HTN,stage IV chronic kidney disease secondary to hypertensive 
nephrosclerosis, HLD, lumbar disc surgery, uterine cancer, colon cancer admitted
with acute onset of lower back pain with lower extremity weakness, as her legs 
just gave out.  Happens to patient about once a year, improved during admission 
with physical therapy and pain control.  Was found incidentally to have ascites 
now s/p paracentesis removing 6L, with ascitic fluid showing no evidence of SBP 
and consistent with cirrhosis/hepatitis.  CT of the abdomen shows large mass and
requires MRI, but patient is claustrophobic and will have outpatient open MRI.
 
Doing well today.  Left side and breast swelling is unchanged, no pain or 
discomfort.  Unable to transfer or climb stairs in her current condition.
 
1. Ascites, very likely to be malignant given hepatic mass and history of 
multiple malignancies
2. Acute on chronic lower back pain
3. Bilateral lower extremity weakness
 
Plan
- Continue on general mediicne
- Continue home medications
- Follow GI recommendations
- Continue home Prednisone
- Monitor edema
- Continue to work with PT as patient is deteriorating
- Would benefit from short term rehab

## 2018-03-06 ENCOUNTER — HOSPITAL ENCOUNTER (INPATIENT)
Dept: HOSPITAL 68 - ERH | Age: 78
LOS: 16 days | Discharge: HOSPICE | DRG: 682 | End: 2018-03-22
Attending: INTERNAL MEDICINE | Admitting: INTERNAL MEDICINE
Payer: COMMERCIAL

## 2018-03-06 VITALS — BODY MASS INDEX: 21.37 KG/M2 | WEIGHT: 106 LBS | HEIGHT: 59 IN

## 2018-03-06 DIAGNOSIS — Z95.820: ICD-10-CM

## 2018-03-06 DIAGNOSIS — C78.7: ICD-10-CM

## 2018-03-06 DIAGNOSIS — E87.1: ICD-10-CM

## 2018-03-06 DIAGNOSIS — N18.4: ICD-10-CM

## 2018-03-06 DIAGNOSIS — Z85.038: ICD-10-CM

## 2018-03-06 DIAGNOSIS — E78.5: ICD-10-CM

## 2018-03-06 DIAGNOSIS — E87.6: ICD-10-CM

## 2018-03-06 DIAGNOSIS — Z95.1: ICD-10-CM

## 2018-03-06 DIAGNOSIS — M19.90: ICD-10-CM

## 2018-03-06 DIAGNOSIS — K76.7: ICD-10-CM

## 2018-03-06 DIAGNOSIS — I12.9: ICD-10-CM

## 2018-03-06 DIAGNOSIS — N39.0: ICD-10-CM

## 2018-03-06 DIAGNOSIS — Z66: ICD-10-CM

## 2018-03-06 DIAGNOSIS — B96.1: ICD-10-CM

## 2018-03-06 DIAGNOSIS — R64: ICD-10-CM

## 2018-03-06 DIAGNOSIS — B96.89: ICD-10-CM

## 2018-03-06 DIAGNOSIS — K72.90: ICD-10-CM

## 2018-03-06 DIAGNOSIS — Z85.42: ICD-10-CM

## 2018-03-06 DIAGNOSIS — K65.9: ICD-10-CM

## 2018-03-06 DIAGNOSIS — I73.9: ICD-10-CM

## 2018-03-06 DIAGNOSIS — R18.8: ICD-10-CM

## 2018-03-06 DIAGNOSIS — R62.7: ICD-10-CM

## 2018-03-06 DIAGNOSIS — N17.9: Primary | ICD-10-CM

## 2018-03-06 DIAGNOSIS — E46: ICD-10-CM

## 2018-03-06 DIAGNOSIS — L89.150: ICD-10-CM

## 2018-03-06 LAB
ABSOLUTE GRANULOCYTE CT: 7.7 /CUMM (ref 1.4–6.5)
BASOPHILS # BLD: 0 /CUMM (ref 0–0.2)
BASOPHILS NFR BLD: 0 % (ref 0–2)
EOSINOPHIL # BLD: 0 /CUMM (ref 0–0.7)
EOSINOPHIL NFR BLD: 0 % (ref 0–5)
ERYTHROCYTE [DISTWIDTH] IN BLOOD BY AUTOMATED COUNT: 14.7 % (ref 11.5–14.5)
GRANULOCYTES NFR BLD: 88.6 % (ref 42.2–75.2)
HCT VFR BLD CALC: 30.7 % (ref 37–47)
LYMPHOCYTES # BLD: 0.6 /CUMM (ref 1.2–3.4)
MCH RBC QN AUTO: 30.1 PG (ref 27–31)
MCHC RBC AUTO-ENTMCNC: 33.3 G/DL (ref 33–37)
MCV RBC AUTO: 90.4 FL (ref 81–99)
MONOCYTES # BLD: 0.4 /CUMM (ref 0.1–0.6)
PLATELET # BLD: 312 /CUMM (ref 130–400)
PMV BLD AUTO: 6.9 FL (ref 7.4–10.4)
RED BLOOD CELL CT: 3.4 /CUMM (ref 4.2–5.4)
WBC # BLD AUTO: 8.7 /CUMM (ref 4.8–10.8)

## 2018-03-06 PROCEDURE — 87075 CULTR BACTERIA EXCEPT BLOOD: CPT

## 2018-03-06 PROCEDURE — 84300 ASSAY OF URINE SODIUM: CPT

## 2018-03-06 PROCEDURE — 84133 ASSAY OF URINE POTASSIUM: CPT

## 2018-03-06 PROCEDURE — P9047 ALBUMIN (HUMAN), 25%, 50ML: HCPCS

## 2018-03-06 PROCEDURE — P9016 RBC LEUKOCYTES REDUCED: HCPCS

## 2018-03-06 PROCEDURE — 04007: CPT

## 2018-03-06 NOTE — ED GI/GU/ABDOMINAL COMPLAINT
History of Present Illness
 
General
Chief Complaint: General Adult
Stated Complaint: INCREASED ASCITES
Source: patient, old records
Exam Limitations: no limitations
 
Vital Signs & Intake/Output
Vital Signs & Intake/Output
 Vital Signs
 
 
Date Time Temp Pulse Resp B/P B/P Pulse O2 O2 Flow FiO2
 
     Mean Ox Delivery Rate 
 
 1435 97.9 65 18 127/68  97 Room Air  
 
/ 0745 98.1 65 20 126/60  96 Room Air  
 
/ 0222 98.1 64 20 121/63  96 Room Air  
 
/ 0137 97.4 66 18 142/68  95 Room Air  
 
03/ 2342 97.6 67 18 146/67  95 Room Air  
 
/ 2129 97.9 62 18 132/63  96 Room Air  
 
 1851       Room Air  
 
/ 1829 97.1 64 18 142/64  95 Room Air  
 
 
 
Allergies
Coded Allergies:
Sulfa (Sulfonamide Antibiotics) (Severe, HIVES 16)
 
Reconcile Medications
Albuterol Sulfate 0.63 MG/3 ML VIAL.NEB   1 Vial INH/SOL 4 TIMES/DAY PRN COPD  (
Reported)
Amlodipine Besylate 10 MG TABLET   1 TAB PO DAILY HTN  (Reported)
Aspirin (Aspirin*) 81 MG TAB.CHEW   1 TAB PO DAILY HEART HEALTH  (Reported)
Cholecalciferol (Vitamin D3) (Vitamin D3) 2,000 UNIT CAPSULE   1 CAP PO BID 
SUPPLEMENT  (Reported)
Clonidine HCl 0.3 MG TABLET   1 TAB PO Q12 HTN  (Reported)
Guaifenesin/Dextromethorphan (Robitussin Cough-Chest Dm Liq) 100 MG-5 MG/5 ML 
LIQUID   10 ML PO Q4 PRN COUGH  (Reported)
Hydralazine HCl 25 MG TABLET   3 TAB PO TID HTN  (Reported)
Isosorbide Mononitrate (Isosorbide Mononitrate ER) 60 MG TAB.ER.24H   1 TAB PO 
DAILY HTN  (Reported)
     HOLD FOR SBP<100MMGH
Metoprolol Tartrate (Lopressor) 50 MG TABLET   1 TAB PO DAILY HTN  (Reported)
Omega-3 Fatty Acids/Fish Oil (Fish Oil 1,000 MG Capsule) 340 MG-1,000 MG CAPSULE
  2 CAP PO DAILY SUPPLEMENT  (Reported)
Omeprazole 40 MG CAPSULE.DR   1 CAP PO DAILY ACID REFLUX  (Reported)
Ondansetron HCl (Zofran) 4 MG TABLET   1 TAB PO TID NAUSEA  (Reported)
Oxycodone HCl 5 MG TABLET   1 TAB PO Q8P PAIN MODERATE  (Reported)
Oxycodone HCl 5 MG TABLET   1 TAB PO TIDPRN PRN BACK PAIN
Polyethylene Glycol 3350 (Miralax) 17 GRAM/DOSE POWDER   17 GM PO BID GI
Prednisone 5 MG TABLET   1.5 TAB PO DAILY ARTHRITIS  (Reported)
Tramadol HCl 50 MG TABLET   1 TAB PO DAILY PAIN  (Reported)
Tramadol HCl 50 MG TABLET   1 TAB PO DAILY PRN BACK PAIN
 
Triage Note:
PT BIBA FROM [facility] WITH C/O POOR PO INTAKE, GENERAL
MALAISE AND INCREASED ASCITES 2/2 LIVER FAILURE.
PT ARRIVES A&O, VSS. DOES C/O SOME NAUSEA
Triage Nurses Notes Reviewed? yes
Pregnant? n
Is pt currently breastfeeding? No
Onset: Gradual
Duration: day(s):
Timing: recent history
Quality/Severity: moderate
Location: generalized abdomen
HPI:
78yo female with hx of HTN, CKD stage 4, CAD s/p CABG, colon CA sent in from 
[facility] for worsening ascites, decreased oral intake, and possible 
hepatic malignancy.  For the past 3 days patient has had decreased oral intake. 
Patient had an MRI performed last week which showed signs of large volume 
ascites and possible hepatic carcinoma.  Patient is currently under GI workup.  
Patient had paracentesis performed 18 with IR and 6.4L were drained from 
her abdomen.  Patient states that shortly after this drainage the fluid 
returned.  Patient reports chronic loose stools.  Patient currently denies 
abdominal pain, chest pain, dyspnea, cough, urinary symptoms, constipation, 
fevers, chills.
(Fransisca CARABALLO,Elis Reyes)
 
Past History
 
Medical History
Any Pertinent Medical History? see below for history
Neurological: CVA (? L CVA w/o residual)
EENT: NONE
Cardiovascular: CAD, hypertension, hyperlipidemia, PVD, CABG 2000/PAD with LE 
stent
Respiratory: NONE
Gastrointestinal: n & v x 1 month PTA 10/22/1990:  remote sigmoid resection for 
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
Hepatic: NONE
Renal: chronic kidney disease (stage IV)
Musculoskeletal: chronic back pain, degen joint disease, falls, gout
Psychiatric: NONE
Endocrine: thyroid disease
Blood Disorders: anemia
Cancer(s): basal cell carcinoma, colon/rectal cancer, endometrial cancer
GYN/Reproductive: NONE
History of MRSA: No
History of VRE: No
History of CDIFF: No
 
Surgical History
Surgical History: hysterectomy (TAHBSO for uterine Ca, f/b RT ), PARTIAL 
COLECTOMY (SIGMOID) 10/1990  DUE TO CANCER OF COLON
 
Psychosocial History
Who do you live with Patient/Self
Services at Home None
What is your primary language English
 
Family History
Family History, If Any:
MOTHER (Unknown - pt adolted).
FATHER (unknown - pt adopted).
 
Hx Contributory? No
(Elis Maddox)
 
Review of Systems
 
Review of Systems
Constitutional:
Reports: no symptoms. 
EENTM:
Reports: no symptoms. 
Respiratory:
Reports: no symptoms. 
Cardiovascular:
Reports: no symptoms. 
GI:
Reports: see HPI. 
Genitourinary:
Reports: no symptoms. 
Musculoskeletal:
Reports: no symptoms. 
Skin:
Reports: no symptoms. 
Neurological/Psychological:
Reports: no symptoms. 
Hematologic/Endocrine:
Reports: no symptoms. 
Immunologic/Allergic:
Reports: no symptoms. 
All Other Systems: Reviewed and Negative
(Elis Maddox)
 
Physical Exam
 
Physical Exam
General Appearance: well developed/nourished, no apparent distress, alert, awake
Head: atraumatic, normal appearance
Eyes:
Bilateral: normal appearance. 
Ears, Nose, Throat, Mouth: hearing grossly normal
Neck: normal inspection, supple, full range of motion
Respiratory: no respiratory distress, diminished breath sounds right posterior 
lung fields
Cardiovascular: regular rate/rhythm
Peripheral Pulses:
2+ radial (R), 2+ radial (L)
Gastrointestinal: normal bowel sounds, moderate ascites
Back: normal inspection, normal range of motion
Extremities: normal range of motion
Neurologic/Psych: awake, alert, oriented x 3
Skin: intact, jaundice
 
Core Measures
ACS in differential dx? No
Sepsis Present: No
Sepsis Focused Exam Completed? No
(Elis Maddox)
 
Progress
Differential Diagnosis: bowel obstruction, hepatitis, acute on chronic kidney 
disease, ascites, pleural effusion, PNA
Plan of Care:
 Orders
 
 
Procedure Date/time Status
 
Heart Healthy Diet  B Active
 
CULTURE,URINE  1625 Active
 
CULTURE,BODY FLUID  1158 Active
 
CYTOLOGY SPECIMEN  1158 Active
 
BODY FLUID TOTAL PROTEIN  1158 Complete
 
BODY FLUID LDH  1158 Complete
 
BODY FLUID CELL COUNT  1158 Complete
 
BODY FLUID GLUCOSE  1158 Complete
 
BODY FLUID AMYLASE  1158 Complete
 
BODY FLUID ALBUMIN  1158 Complete
 
PROTHROMBIN TIME  0832 Active
 
Change service to  0807 Active
 
TROPONIN LEVEL  0745 Complete
 
URINALYSIS  0721 Complete
 
HEPATIC FUNCTION PANEL  06 Complete
 
CBC WITHOUT DIFFERENTIAL  06 Complete
 
BASIC ELECTROLYTES PLUS BUN&CR  06 Complete
 
Turn and Reposition  0244 Active
 
Skin Integrity Protocol  0244 Active
 
Skin/Pressure Ulcer Assess (Sk  0244 Active
 
URINE LYTES, SPOT  0214 Complete
 
Weight  0145 Active
 
Vital Signs  0145 Active
 
Teach/Educate  0145 Active
 
Pain Treatment and Response  0145 Active
 
Nutritional Intake, Monitor  0145 Active
 
Isolation  0145 Active
 
Intake & Output  0145 Active
 
Patient Care Conference  0145 Active
 
Activity/Ambulation  0145 Active
 
Intake & Output  0134 Active
 
Nursing Misc   UNK Active
 
EKG   UNK Active
 
PHYSICIAN CONSULT   UNK Active
 
NUTRITIONAL CONSULT   UNK Active
 
Pathway - chart  2357 Active
 
Saline Lock  2356 Active
 
Pathway - chart  2356 Active
 
House Staff  2356 Active
 
Patient Data  2342 Active
 
ED Holding Orders  2317 Active
 
Admit to inpatient  2317 Active
 
Vital Signs  2317 Active
 
Code Status  2317 Active
 
TROPONIN LEVEL  2229 Complete
 
EKG  2229 Active
 
TROPONIN LEVEL  1855 Complete
 
COMPREHENSIVE METABOLIC PANEL  1855 Complete
 
CBC WITHOUT DIFFERENTIAL  1855 Complete
 
EKG  1855 Active
 
VTE Mechanical Prophylaxis   UNK Active
 
Hemoccult   UNK Active
 
 
 Current Medications
 
 
  Sig/Jose E Start time  Last
 
Medication Dose  Stop Time Status Admin
 
Potassium Chloride 20 MEQ   1345 CAN 
 
(KCl 20MEQ in D5W 1/     
 
2NS 1000ML)     
 
Albumin Human 12.5 GM TID  1000 AC 
 
(Plasbumin)     1539
 
Amlodipine Besylate 10 MG DAILY  1000 AC 
 
(Norvasc)     1535
 
Cholecalciferol 1,000 IU BID  1000 AC 
 
(Vitamin D)     
 
Clonidine 0.3 MG Q12  1000 AC 
 
(Catapres)     
 
Hydralazine HCl 75 MG TID  1000 AC 
 
(Apresoline)     
 
Isosorbide  60 MG DAILY  1000 AC 
 
Mononitrate     1532
 
(Imdur)     
 
Metoprolol Tartrate 50 MG DAILY  1000 AC 
 
(Lopressor)     1532
 
Polyethylene Glycol 17 GM BID  1000 AC 
 
(Miralax)     
 
Tramadol HCl 50 MG DAILY  1000 AC 
 
(Ultram)     
 
Omeprazole 40 MG DAILY AC  0700 AC 
 
(Prilosec)     0502
 
Albuterol Sulfate 3 ML Q4H PRN  020 AC 
 
(Proventil)     
 
Guaifenesin 10 ML Q6P PRN  0200 AC 
 
(Robitussin)     
 
Ondansetron HCl 4 MG TID PRN  0200 AC 
 
(Zofran)     0249
 
Oxycodone HCl 5 MG Q8P PRN  020 AC 
 
(Roxicodone)     0238
 
Cyclosporine 125 MG BID  2330 CAN 
 
(Neoral 100MG Cap)    2359  
 
 
 Laboratory Tests
 
 
 
18 1312:
Fluid WBC 67  H, Fld Mesothelial Cells 57, Fld Total RBCs Counted 100  H
 
18 1312:
Lymphocytes 12, % Normal PMNs 31, Fluid Glucose 68, Fluid Total Protein < 2.0, 
Fluid Albumin < 1.0, Fluid , Fluid Amylase < 30
 
18 1100:
Urine Color COREY, Urine Clarity HAZY  H, Urine pH 6.0, Ur Specific Gravity 
1.015, Urine Protein 30  H, Urine Ketones NEG, Urine Nitrite POS  H, Urine 
Bilirubin NEG, Urine Urobilinogen 0.2, Ur Leukocyte Esterase MOD  H, Ur 
Microscopic SEDIMENT EXAMINED, Urine WBC 25-50  H, Ur Epithelial Cells FEW, 
Urine Bacteria MANY  H, Urine Hemoglobin NEG, Urine Glucose NEG
 
18 1100:
Ur Random Creatinine 75.3, Ur Random Sodium 25  L, Ur Random Potassium 19.0, 
Fraction Sodium Excret 0.6
 
18 0745:
Anion Gap 12, Estimated GFR 21  L, BUN/Creatinine Ratio 27.8  H, Total Bilirubin
0.4, Direct Bilirubin 0.4, AST 19, ALT 17, Alkaline Phosphatase 192  H, Troponin
I < 0.01, Total Protein 4.4  L, Albumin 2.2  L, CBC w Diff NO MAN DIFF REQ, RBC 
2.72  L, MCV 89.3, MCH 30.1, MCHC 33.7, RDW 14.6  H, MPV 7.0  L, Gran % 85.7  H,
Lymphocytes % 6.8  L, Monocytes % 7.3, Eosinophils % 0.1, Basophils % 0.1, 
Absolute Granulocytes 7.6  H, Absolute Lymphocytes 0.6  L, Absolute Monocytes 
0.6, Absolute Eosinophils 0, Absolute Basophils 0
 
18 0709:
Troponin I Cancelled
 
18 0215:
Troponin I Cancelled
 
18 2240:
Troponin I 0.01
 
18 1915:
Anion Gap 11, Estimated GFR 22  L, BUN/Creatinine Ratio 30.0  H, Glucose 76, 
Calcium 9.3, Total Bilirubin 0.6, AST 24, ALT 27, Alkaline Phosphatase 230  H, 
Troponin I 0.02, Total Protein 5.0  L, Albumin 2.4  L, Globulin 2.6, Albumin/
Globulin Ratio 0.9  L, CBC w Diff NO MAN DIFF REQ, RBC 3.40  L, MCV 90.4, MCH 
30.1, MCHC 33.3, RDW 14.7  H, MPV 6.9  L, Gran % 88.6  H, Lymphocytes % 6.7  L, 
Monocytes % 4.7, Eosinophils % 0, Basophils % 0, Absolute Granulocytes 7.7  H, 
Absolute Lymphocytes 0.6  L, Absolute Monocytes 0.4, Absolute Eosinophils 0, 
Absolute Basophils 0
 Microbiology
 1625  URINE ROUT: Urine Culture - ORD
 1312  BODY FLUID: Body Fluid Culture - RECD
 1312  BODY FLUID: Gram Stain - RECD
 
Previous MRI and old records reviewed. Creatinin increasing from 1.5 to 2.2.
 
Spoke with Dr. Ruff regarding this patient - decreasing renal function in 
cirrhotic patient, failure to thrive with decrease PO intake. Will likely 
require multiple nights stay given hepatorenal failure. Speedy reports large 
paracentesis is contraindicated, Diagnostic tap only, if necessary. Patient 
requires further cancer evaluation given MRI findings, CEA elevated.
 
Dr. Mckeon present to see and evaluate patient.
 
Discussed this patient with hospitalist, Dr. Braxton, regarding her general 
medicine admission.
 
Diagnostic Imaging:
Viewed by Me: Radiology Read.  Discussed w/RAD: Radiology Read. 
CXR Impression: PATIENT: BROOK QUIROZ  MEDICAL RECORD NO: [id number] PRESENT AGE: 
77  PATIENT ACCOUNT NO: [id number] : 41  LOCATION: [facility] ORDERING PHYSICIAN:
Elis CARABALLO     SERVICE DATE:  EXAM TYPE: RAD - XRY-
PORTABLE CHEST XRAY EXAMINATION: XR PORTABLE CHEST CLINICAL INFORMATION: 
Diminished breath sounds COMPARISON: 2016 TECHNIQUE: Portable frontal view
of the chest was obtained. FINDINGS: Slight apical lordotic positioning of the 
patient. Lungs are well expanded and without interstitial edema, focal 
consolidation or pleural effusion. Cardiac silhouette is normal in size. 
Surgical changes from remote coronary artery bypass grafting with intact 
sternotomy wires in place. No acute osseous findings. Chronic osteoarthritis of 
glenohumeral joints. IMPRESSION: Surgical changes from prior CABG. No acute 
cardiopulmonary findings compared 2016. DICTATED BY: Prabhjot Swift MD  
DATE/TIME DICTATED:18 :RAD.GARCIA  DATE/TIME 
TRANSCRIBED:18 CONFIDENTIAL, DO NOT COPY WITHOUT APPROPRIATE 
AUTHORIZATION.  <Electronically signed in Other Vendor System>                  
                                                                     SIGNED BY: 
Prabhjot Swift MD 18
Initial ED EKG: sinus rhythm @64bpm, nonspecific T wave changes through out
Prior EKG: changed (18)
(Fransisca CARABALLO,Elis Reyes)
 
Departure
 
Departure
Disposition: STILL A PATIENT
Condition: Stable
Clinical Impression
Primary Impression: Hepatorenal failure
Secondary Impressions: Acute on chronic kidney failure, Ascites, Decreased 
appetite
Referrals:
Adalberto De La O MD (PCP/Family)
 
Departure Forms:
Customer Survey
General Discharge Information
 
Admission Note
Spoke With:
Clare Monsalve MD
Documentation of Exam:
Documentation of any treatments & extenuating circumstances including Concerns 
Regarding Discharge (functional status, medication knowledge or non-compliance, 
living conditions, etc.) that warrant an admission rather than observation: [
hepatorenal failure with acute on chronic kidney failure in setting of cirrhosis
, likely liver malignancy, requiring possible diagnostic paracentesis, possible 
IR consult, GI consult, Nephrology consult, repeat labs, premature discharge 
would be medically unsafe.]
 
(Fransisca CARABALLO,Elis Reyes)
 
Departure
Comments
 
 
 
 
3/6/18
10:47 PM
77-year-old female with a history of end-stage liver disease.  Presents with 
increasing site ascites and decreased appetite.  On physical exam she does have 
ascites but no distention.  No difficulty breathing, chest x-ray is 
unremarkable.  Will repeat second set of enzymes for EKG changes and obtain 
ultrasound to assess degree of ascites she has no abdominal tenderness.
 
PA/NP Co-Sign Statement
Statement:
ED Attending supervision documentation-
 
[X] I saw and evaluated the patient. I have also reviewed all the pertinent lab 
results and diagnostic results. I agree with the findings and the plan of care 
as documented in the PA's/NP's documentation. Patient with no fever, and 
abdominal pain, no respirtatory distress on my exam.
 
[] I have reviewed the ED Record and agree with the PA's/NP's documentation.
 
[] Additions or exceptions (if any) to the PAs/NP's note and plan are 
summarized below:
[]
 
(Win Mckeon DO

## 2018-03-06 NOTE — RADIOLOGY REPORT
EXAMINATION:
XR PORTABLE CHEST
 
CLINICAL INFORMATION:
Diminished breath sounds
 
COMPARISON:
11/16/2016
 
TECHNIQUE:
Portable frontal view of the chest was obtained.
 
FINDINGS:
Slight apical lordotic positioning of the patient. Lungs are well expanded
and without interstitial edema, focal consolidation or pleural effusion.
Cardiac silhouette is normal in size. Surgical changes from remote coronary
artery bypass grafting with intact sternotomy wires in place. No acute
osseous findings. Chronic osteoarthritis of glenohumeral joints.
 
IMPRESSION:
Surgical changes from prior CABG. No acute cardiopulmonary findings compared
11/16/2016.

## 2018-03-06 NOTE — HISTORY & PHYSICAL
Shana Patricio MD 03/06/18 8106:
General Information and HPI
MD Statement:
I have seen and personally examined BROOK QUIROZ and documented this H&P.
 
The patient is a 77 year old F who presented with a patient stated chief 
complaint of increased ascites and decreased oral intake in the setting of 
recently found hepatic masses suspicious of hepatocellular carcinoma.
 
Source of Information: patient, old records, W[facility]
Exam Limitations: no limitations
History of Present Illness:
Patient is a 77 her old female with a past medical history significant for 
hypertension, hyperlipidemia, chronic kidney disease stage IV, CAD status post 
triple vessel CABG over 10 years ago, previous sigmoid colon cancer resected, 
PVD with carotid endarterectomy over 10 years ago and most recent angiography 
done in 2014, chronic back pain, that is brought in by ambulance from her F 
[facility] for concerns of increased ascites, decreased renal function, 
and decreased appetite in the setting of recent liver masses found on MRI.  
Patient was recently admitted to [facility] at the beginning of February 
for back pain, lower extremity weakness, gait instability, and increased 
abdominal girth.  CT scan showed enlarging liver mass compared to CT from 2016. 
Alpha-fetoprotein was found to be negative.  Antimitochondrial and anti-smooth 
muscle antibodies were also found to be negative.  However, CEA was found to be 
positive. As patient is claustrophobic MRI was deferred until she could do open 
MRI outpatient.  Results from thyroid 27th open MRI showed multiple hepatic 
lesions which demonstrated heterogeneous enhancement, increased in number since 
CT from 2016, multiple renal cysts.  The patient apparently saw Dr. Holland 
recently who told her of the results but offered no plan.  However the nurse 
from [facility] stated that it was Dr. Holland who wanted the patient 
sent here today for management of her suspected hepatocellular carcinoma, 
growing ascites, and failure to thrive.
 
The patient also admits to recent malaise, and states that was nauseous and did 
vomit on the way to the hospital today but attributes it to carsickness.  She 
also endorses decreased appetite but states that it is because she does not like
the food at [facility].  The patient has had recent diarrhea but knows 
nothing of the color/if blood is present etc.   The patient admits to recent 
weight loss of about 15 pounds.  The patient denies any nausea, vomiting now, 
recent abdominal pain, diaphoresis, night sweats, chest pain, headaches, 
shortness of breath, dizziness, recent loss of consciousness, swelling of the 
feet.  The patient states that before her hospitalization here a month ago, she 
was walking unassisted.  Now she barely ever gets out of bed.  She has been [facility] since her discharge.
 
The patient denies ever having an issue with alcohol.  She denies any drug use. 
She states that she has never had a blood transfusion before 2015, no hepatitis,
no extensive use of Tylenol.
 
The patient has chronic back pain and a history of laminectomy in the past.  She
notes chronic discomfort and as per her nurse at [facility] does receive 
oxycodone 5 mg about 3 times a week for pain.  
 
The patient's PCP is Dr. De La O
 
Allergies/Medications
Allergies:
Coded Allergies:
Sulfa (Sulfonamide Antibiotics) (Severe, HIVES 11/20/16)
 
Compliance With Home Meds: GOOD
 
Past History
 
Travel History
Traveled to [facility] past 21 day No
 
Medical History
Neurological: CVA (? L CVA w/o residual)
EENT: NONE
Cardiovascular: CAD, hypertension, hyperlipidemia, PVD, CABG 2000/PAD with LE 
stent
Respiratory: NONE
Gastrointestinal: n & v x 1 month PTA 10/22/1990:  remote sigmoid resection for 
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
Hepatic: NONE
Renal: chronic kidney disease (stage IV)
Musculoskeletal: chronic back pain, degen joint disease, falls, gout
Psychiatric: NONE
Endocrine: thyroid disease
Blood Disorders: anemia
Cancer(s): basal cell carcinoma, colon/rectal cancer, endometrial cancer
GYN/Reproductive: NONE
History of MRSA: No
History of VRE: No
History of CDIFF: No
 
Surgical History
Surgical History: hysterectomy (TAHBSO for uterine Ca, f/b RT ), PARTIAL 
COLECTOMY (SIGMOID) 10/1990  DUE TO CANCER OF COLON
 
Past Family/Social History
 
Family History
Relations & Conditions if any
MOTHER (Unknown - pt adolted).
FATHER (unknown - pt adopted).
 
 
Psychosocial History
Who Do You Live With? self
Services at Home: None
Primary Language: English
ETOH Use: denies use
Living Will? yes
Power of /HCP? yes
Name of POA/HCP: Clare Alston- friend/POA
 
Functional Ability
ADLs
Independent: dressing, eating, toileting, bathing. 
Ambulation: cane
IADLs
Independent: shopping, housework, finances, food prep, telephone, transportation
, medication admin. 
 
Review of Systems
 
Review of Systems
Constitutional:
Reports: see HPI, malaise, weakness, unexplained weight loss. 
EENTM:
Reports: no symptoms. 
Cardiovascular:
Reports: no symptoms. 
Respiratory:
Reports: no symptoms. 
GI:
Reports: diarrhea, distention, vomiting. 
Genitourinary:
Reports: no symptoms. 
Musculoskeletal:
Reports: back pain. 
Skin:
Reports: no symptoms. 
Neurological/Psychological:
Reports: no symptoms. 
Hematologic/Endocrine:
Reports: no symptoms. 
Immunologic/Allergic:
Reports: no symptoms, see HPI, splenectomy, HIV/AIDS, lymphadenopathy, other. 
All Other Systems: Reviewed and Negative
 
Exam & Diagnostic Data
Last 24 Hrs of Vital Signs/I&O
 Vital Signs
 
 
Date Time Temp Pulse Resp B/P B/P Pulse O2 O2 Flow FiO2
 
     Mean Ox Delivery Rate 
 
03/07 0222 98.1 64 20 121/63  96 Room Air  
 
03/07 0137 97.4 66 18 142/68  95 Room Air  
 
03/06 2342 97.6 67 18 146/67  95 Room Air  
 
03/06 2129 97.9 62 18 132/63  96 Room Air  
 
03/06 1851       Room Air  
 
03/06 1829 97.1 64 18 142/64  95 Room Air  
 
 
 Intake & Output
 
 
 03/07 0800 03/07 0000 03/06 1600
 
Intake Total 50  
 
Output Total   
 
Balance 50  
 
    
 
Intake, Oral 50  
 
Patient 118 lb  
 
Weight   
 
Weight Bed scale  
 
Measurement   
 
Method   
 
 
 
 
Physical Exam
General Appearance Alert, Oriented X3, Cooperative, No Acute Distress
Skin No Rashes, No Significant Lesion, stage I buttock ulcers, bandaged, 
bilateral
Skin Temp/Moisture Exam: Warm/Dry
Sepsis Skin Exam (color): Normal for Ethnicity
HEENT Atraumatic, EOMI, Mucous Membr. moist/pink, pinpoint pupils mildly 
reactive
Neck Supple, No JVD
Cardiovascular Regular Rate, Normal S1, Normal S2, systolic murmur at LLSB 3/6
Lungs Clear to Auscultation, Normal Air Movement
Abdomen Normal Bowel Sounds, Soft, No Tenderness, distended with fluid shift
Neurological Normal Speech
Extremities No Clubbing, No Cyanosis, Normal Pulses, No Tenderness/Swelling (2+ 
nonpitting edema bilaterall)
Vascular Normal Pulses, Pulses Symmetrical
Sepsis Peripheral Pulse Location: Radial
Sepsis Peripheral Pulse Exam: Normal
Sepsis Cap Refill Exam: <2 Sec
Last 24 Hrs of Labs/Lio:
 Laboratory Tests
 
03/06/18 2240:
Troponin I 0.01
 
03/06/18 1915:
Anion Gap 11, Estimated GFR 22  L, BUN/Creatinine Ratio 30.0  H, Glucose 76, 
Calcium 9.3, Total Bilirubin 0.6, AST 24, ALT 27, Alkaline Phosphatase 230  H, 
Troponin I 0.02, Total Protein 5.0  L, Albumin 2.4  L, Globulin 2.6, Albumin/
Globulin Ratio 0.9  L, CBC w Diff NO MAN DIFF REQ, RBC 3.40  L, MCV 90.4, MCH 
30.1, MCHC 33.3, RDW 14.7  H, MPV 6.9  L, Gran % 88.6  H, Lymphocytes % 6.7  L, 
Monocytes % 4.7, Eosinophils % 0, Basophils % 0, Absolute Granulocytes 7.7  H, 
Absolute Lymphocytes 0.6  L, Absolute Monocytes 0.4, Absolute Eosinophils 0, 
Absolute Basophils 0
 
 
Assessment/Plan
Assessment:
Patient is a 77 her old female with a past medical history significant for 
hypertension, hyperlipidemia, chronic kidney disease stage IV, CAD status post 
triple vessel CABG over 10 years ago, previous sigmoid colon cancer resected, 
PVD with carotid endarterectomy over 10 years ago and most recent angiography 
done in 2014, chronic back pain, that is brought in by ambulance from her ECF 
[facility] for concerns of increased ascites, decreased renal function, 
and decreased appetite in the setting of recent liver masses found on MRI with a
positive CEA.  The patient follows with Dr. Holland who, as per call placed to 
[facility], is the one who sent her in for management of her increased 
ascites, decreased renal function, and recent MRI findings.  Patient was last 
seen here in early February when suspicion for malignancy began and had 
diagnostic and therapeutic tap done with no infection seen on ascites fluid 
culture.
 
In the ED, patient was found to have stable vitals.  EKG showed a rate of 64 
with inferolateral T-wave changes compared to her EKG from last time.  WBC count
was found to be 8.7, hemoglobin 10.2, sodium 132, BUN 66, creatinine 2.2 up from
her baseline of about 1.3, alkaline phosphatase 230, normal liver function test,
negative troponins, albumin 2.4, protein 5.  Chest x-ray showed no acute 
cardiopulmonary findings.
 
As patient's with chronic kidney disease and SBP have increased risk of 
hepatorenal syndrome, our most important step is to rule out infection of her 
ascitic fluid and if it is there, treat her.  We discussed doing diagnostic tap 
tonight but patient stated she wanted to defer till tomorrow when "hopefully Dr. Mcleod can do it".  ER staff have spoken with Dr. Holland who is aware.  Patient
is most likely experiencing type II hepatorenal syndrome which is slower in 
onset and progression and with a lower rise in creatinine (less than 2.5).  
Urine sodium is usually less than 10 in this type of hepatorenal syndrome.  In 
any case, patient's prognosis is gram with survival less than 6 months unless 
patient did receive a liver transplant.
 
 
 
Plan
 
Ascites in the setting of HCC suspicion and CKD, most likely hepatorenal 
syndrome.
-INR (needed to determine Maddrey's/MELD score)
-Formal consult with Dr. Holland for tomorrow
-Plan for diagnostic paracentesis with IR tomorrow as patient stated she wanted 
Dr. Mcleod to perform the tap.
-Albumin 12.5 g 3 times a day IV
-Zofran for nausea 4 mg 3 times a day
-Follow up hepatic function panel and albumin and morning labs
-Nephrology consult
-Follow up urine random creatinine, sodium, potassium and FeNa
 
EKG changes:  No symptoms suggestive of ACS.  Vitals stable.  Troponin negative.
-Repeat troponin and EKG in the morning
 
Bedsores
-Consider wound care consultation.  Sores are NOT open and stage I.
 
Chronic medical problems
-Tramadol 50 mg and oxycodone 5 mg 3 times a day when necessary daily for back 
pain
-Lopressor 50 mg, clonidine 0.3 mg twice a day, Norvasc 10 mg daily, and 
hydralazine 75 mg 3 times a day for hypertension
-PPI for gastritis
 
 
 
Heart healthy diet
DVT prophylaxis with Alps and subcutaneous heparin AFTER paracentesis
Full code
 
As Ranked By This Provider
Problem List:
 1. Decreased appetite
 
 2. Acute on chronic kidney failure
 
 3. Hepatorenal failure
 
 4. Intractable back pain
 
 5. Liver lesion
 
 6. Ascites
 
 
Core Measures/Misc (9/17)
 
Acute Coronary Syndrome
ACS Diagnosis: No
 
Congestive Heart Failure
Congestive Heart Failure Diagnosis No
 
Cerebrovascular Accident
CVA/TIA Diagnosis: No
 
VTE (View Protocol)
VTE Risk Factors Acute Medical Illness
No Mechanical VTE Prophylaxis d/t N/A MechProphylax Ordered
No VTE Pharm Prophylaxis d/t Surgical Contraindication
 
Sepsis (View protocol)
Sepsis Present: No
 
Albalwai,Afaf 03/06/18 4746:
General Information and HPI
 
Allergies/Medications
Home Med list
Albuterol Sulfate 0.63 MG/3 ML VIAL.NEB   1 Vial INH/SOL 4 TIMES/DAY PRN COPD  (
Reported)
Amlodipine Besylate 10 MG TABLET   1 TAB PO DAILY HTN  (Reported)
Aspirin (Aspirin*) 81 MG TAB.CHEW   1 TAB PO DAILY HEART HEALTH  (Reported)
Cholecalciferol (Vitamin D3) (Vitamin D3) 2,000 UNIT CAPSULE   1 CAP PO BID 
SUPPLEMENT  (Reported)
Clonidine HCl 0.3 MG TABLET   1 TAB PO Q12 HTN  (Reported)
Guaifenesin/Dextromethorphan (Robitussin Cough-Chest Dm Liq) 100 MG-5 MG/5 ML 
LIQUID   10 ML PO Q4 PRN COUGH  (Reported)
Hydralazine HCl 25 MG TABLET   3 TAB PO TID HTN  (Reported)
Isosorbide Mononitrate (Isosorbide Mononitrate ER) 60 MG TAB.ER.24H   1 TAB PO 
DAILY HTN  (Reported)
     HOLD FOR SBP<100MMGH
Metoprolol Tartrate (Lopressor) 50 MG TABLET   1 TAB PO DAILY HTN  (Reported)
Omega-3 Fatty Acids/Fish Oil (Fish Oil 1,000 MG Capsule) 340 MG-1,000 MG CAPSULE
  2 CAP PO DAILY SUPPLEMENT  (Reported)
Omeprazole 40 MG CAPSULE.DR   1 CAP PO DAILY ACID REFLUX  (Reported)
Ondansetron HCl (Zofran) 4 MG TABLET   1 TAB PO TID NAUSEA  (Reported)
Oxycodone HCl 5 MG TABLET   1 TAB PO Q8P PAIN MODERATE  (Reported)
Oxycodone HCl 5 MG TABLET   1 TAB PO TIDPRN PRN BACK PAIN
Polyethylene Glycol 3350 (Miralax) 17 GRAM/DOSE POWDER   17 GM PO BID GI
Prednisone 5 MG TABLET   1.5 TAB PO DAILY ARTHRITIS  (Reported)
Tramadol HCl 50 MG TABLET   1 TAB PO DAILY PAIN  (Reported)
Tramadol HCl 50 MG TABLET   1 TAB PO DAILY PRN BACK PAIN
 
 
 
Resident Review Statement
Resident Statement: examined this patient, discussed with intern, agreed with 
intern, discussed with family, discussed with nursing, reviewed images
Other Findings:
Mrs. Santiago is a 76 yo lady with PMHx. of of CAD s/p CABG (2000), PVD s/p 
angioplasty with stent placement, HTN, stage IV CKD secondary to hypertensive 
nephrosclerosis, HLD, lumbar disc surgery, uterine cancer, colon cancer s/p 
resection back in 90"s.
Presented to ED from [facility] with a concern of increased 
ascites, decrease oral intake and a suspicion of hepatocellular carcinoma on a 
recent MRI. She was recently dc from [facility] after been treated for 
generalized weakness and RAHUL and found to have a liver mass with a large ascites
, she was dc with a recommendation to f/u with anopen MRI given that she is 
clustrophobic and refused closed MRI. MRI was done which showed multiple 
heterogenous opacity highly suggestive for HHC, and increase ascites, she was 
seen at her GI physician office who discussed the result of MRI with the 
patient.  Now she came back for a concern of decreased oral intake, increased 
abdominal distention, and MRI finding.  Patient denies any abdominal pain when 
we asked her, no shortness of breath.  Her lab is pertinent for BUN of 66, 
creatinine 2.2, alk phosphatase 230.  Will admit the patient to general medicine
floor, we discussed with her the need for diagnostic paracentesis but patient 
refused to do it overnight she wanted to be done in the morning.  We'll start 
albumin 25% 12.5 mg 3 times a day.  Will send urine lites, will repeat EKG and 
troponin as her hCG at the emergency department shows T-wave inversion in the 
inferior leads and flattening on the lateral lead.  GI consult with Dr. Holland., nephrology consult with  will start DVT prophylaxis with 
after paracentesis: SC heparin at a.m. she is a full code
 
 
 
Bello PINTO, [facility] 03/07/18 0449:
Attending MD Review Statement
 
Attending Statement
Attending MD Statement: examined this patient, discuss w/resident/PA/NP, agreed 
w/resident/PA/NP, reviewed images, amended to note
Attending Assessment/Plan:
76 yo F with h/o PVD s/p angioplasty with stent, HTN, CAD s/p CABG, CKD stage 4,
uterine and colon cancer, was recently admitted to [place] (Jan 2018) for 
evaluation of new onset ascites  ?malignant with new hepatic masses concerning 
for primary HCC vs metastases. She was discharged to [facility] (feb 9) 
with eventual plan for outpatient MRI and GI follow up. 
MRI (Feb 28) shows multiple hepatic lesions concerning for HCC, large volume 
ascites, multiple renal cysts and small lobulated cystic lesion in mid 
pancreatic body. 
Patient states she followed up with Dr. Jolly and was told that the cancer is
fatal. She does not recollect being told of any management options.
 
Patient comes in today for poor PO intake and an episode of nausea/ vomiting. 
Patient states that she does not like the food that is provided at the facility,
and hence she does not eat. She thinks she has lost weight, reports current 
weight at 113 pounds. She also reports intermittent diarrhea though not on a 
daily basis. She has noticed her abdomen to be more distended but denies 
abdominal pain or fever/ chills. She also does not work with PT due to her lower
extremity weakness, mostly bedbound.
 
Vitals stable. Cachectic lady in no distress, Abdomen distended with fluid 
thrill, nontender, LE  1+ edema. Back: chronic buttock wounds. 
Labs: no leukocytosis, Na 132, bicarb 19, BUN 66, creat 2.2 (baseline 1.2  1.5)
, trop neg.
CXR: no acute findings.
EKG: SR with T-wave inversions in inferior leads that resolved on repeat EKG. 
Echo (2016): EF 60-65%, stage 1 diastolic dysfunction.
 
Assessment and plan:
1. Poor PO intake, failure to thrive
2. RAHUL on CKD stage 4 ?dehydration, poor PO intake 
3. Ascites, hepatic mass concerning for HCC vs metastases (previous uterine and 
colon cancer), CEA elevated, AFP  1 may not be HCC.
4. Rule out hepatorenal syndrome
5. Assess for SBP, no large volume paracentesis at this point
6. History of CAD and PVD
7. Chronic low back pain
 
- Admit to general medicine
- Nutritional consult, encourage PO intake
- Check urine lytes to assess sodium excretion and urinalysis to asses 
proteinuria
- IV albumin now and then TID, would avoid isotonic fluids
- ?addition of Terlipressin if this is truly HRS
- Trend renal functions
- GI consult and nephro consult in AM
- Diagnostic paracentesis to rule out SBP as it can trigger HRS
- Patient refused paracentesis overnight, please consult IR in AM
- Repeat EKG and troponin in AM
- Goals of care discussion
- Wound care  decubitus ulcers
- Resume home meds  avoid NSAIDs or nephrotoxic meds.
DVT ppx Hep SC. Full code.

## 2018-03-07 VITALS — DIASTOLIC BLOOD PRESSURE: 63 MMHG | SYSTOLIC BLOOD PRESSURE: 121 MMHG

## 2018-03-07 VITALS — DIASTOLIC BLOOD PRESSURE: 68 MMHG | SYSTOLIC BLOOD PRESSURE: 127 MMHG

## 2018-03-07 VITALS — SYSTOLIC BLOOD PRESSURE: 157 MMHG | DIASTOLIC BLOOD PRESSURE: 75 MMHG

## 2018-03-07 VITALS — DIASTOLIC BLOOD PRESSURE: 60 MMHG | SYSTOLIC BLOOD PRESSURE: 126 MMHG

## 2018-03-07 LAB
ABSOLUTE GRANULOCYTE CT: 7.6 /CUMM (ref 1.4–6.5)
BASOPHILS # BLD: 0 /CUMM (ref 0–0.2)
BASOPHILS NFR BLD: 0.1 % (ref 0–2)
EOSINOPHIL # BLD: 0 /CUMM (ref 0–0.7)
EOSINOPHIL NFR BLD: 0.1 % (ref 0–5)
ERYTHROCYTE [DISTWIDTH] IN BLOOD BY AUTOMATED COUNT: 14.6 % (ref 11.5–14.5)
GRANULOCYTES NFR BLD: 85.7 % (ref 42.2–75.2)
HCT VFR BLD CALC: 24.3 % (ref 37–47)
LYMPHOCYTES # BLD: 0.6 /CUMM (ref 1.2–3.4)
MCH RBC QN AUTO: 30.1 PG (ref 27–31)
MCHC RBC AUTO-ENTMCNC: 33.7 G/DL (ref 33–37)
MCV RBC AUTO: 89.3 FL (ref 81–99)
MONOCYTES # BLD: 0.6 /CUMM (ref 0.1–0.6)
PLATELET # BLD: 259 /CUMM (ref 130–400)
PMV BLD AUTO: 7 FL (ref 7.4–10.4)
RED BLOOD CELL CT: 2.72 /CUMM (ref 4.2–5.4)
WBC # BLD AUTO: 8.9 /CUMM (ref 4.8–10.8)

## 2018-03-07 PROCEDURE — 0W9G3ZZ DRAINAGE OF PERITONEAL CAVITY, PERCUTANEOUS APPROACH: ICD-10-PCS

## 2018-03-07 NOTE — ADMISSION CERTIFICATION
Admission Certification
 
Certification Statement
- As attending physician, I certify that at the time of
- admission, based on clinical presentation, severity of
- symptoms, need for further diagnostic testing and
- therapeutic interventions, and risk of adverse outcomes
- without in-hospital treatment, in my clinical assessment,
- this patient requires an acute hospital stay for a minimum
- of two nights or longer. I have also considered psychsocial
- factors such as support system, advanced age, financial
- issues, cognitive issues, and failed out-patient treatments,
- past re-admission history, safety of patient, and lack of
- compliance as applicable.
Specific rationale supporting this admission is:
RAHUL on CKD, liver mass concerning for HCC, ascites, needs admission for GI and 
nephro consult, rule out hepatorenal syndrome.

## 2018-03-07 NOTE — CONS- NEPHROLOGY
General Information and HPI
 
Consulting Request
Date of Consult: 03/07/18
Requested By:
James Camp MD
 
Reason for Consult:
RAHUL
Source of Information: patient, old records
Exam Limitations: no limitations
History of Present Illness:
The patient is a 77-year-old woman with a past medical history most significant 
for stage III chronic kidney disease with baseline creatinine approximately 1.3-
1.5, recently diagnosed liver mass and elevated CEA level and history of colon 
cancer status post resection as well as a history of endometrial cancer, 
hypertension, peripheral vascular disease who presents with her significant 
appetite and labs.
 
The patient was admitted to [facility] back in February when she was found
to have an enlarging liver mass for which the workup is still ongoing.  She was 
ultimately discharged to an extended care facility.  She tells me that the food 
is terrible and she has been unable to really eat anything.  She has also had 
some nausea, vomiting, and diarrhea.  She has been trying to drink fluid.  No 
fevers or chills or abdominal pain.  Overall she feels swollen.  Should note 
that the patient says she had a recent LVP where she had 6.5L of ascitic fluid 
removed but cannot recall where/when.
 
On Presentation, blood pressure 142/64afebrile.  Labs notable for serum 
creatinine 2.2, sodium 132, bicarbonate 19, T bili 0.4, normal AST/ALT, negative
troponin, albumin 2.2, UA with 30md/dl protein/positive nitrite with 25-50 WBC's
- no UCx.  FENa 0.6%.  She has been started on IV albumin.  She just underwent 
an LVP (not sure how much fluid removed).  SCr on repeat 2.3.  Still feels dry.
 
Should note no NSAID's.  No urinary difficulties.
 
Allergies/Medications
Allergies:
Coded Allergies:
Sulfa (Sulfonamide Antibiotics) (Severe, HIVES 11/20/16)
 
Home Med List:
Albuterol Sulfate 0.63 MG/3 ML VIAL.NEB   1 Vial INH/SOL 4 TIMES/DAY PRN COPD  (
Reported)
Amlodipine Besylate 10 MG TABLET   1 TAB PO DAILY HTN  (Reported)
Aspirin (Aspirin*) 81 MG TAB.CHEW   1 TAB PO DAILY HEART HEALTH  (Reported)
Cholecalciferol (Vitamin D3) (Vitamin D3) 2,000 UNIT CAPSULE   1 CAP PO BID 
SUPPLEMENT  (Reported)
Clonidine HCl 0.3 MG TABLET   1 TAB PO Q12 HTN  (Reported)
Guaifenesin/Dextromethorphan (Robitussin Cough-Chest Dm Liq) 100 MG-5 MG/5 ML 
LIQUID   10 ML PO Q4 PRN COUGH  (Reported)
Hydralazine HCl 25 MG TABLET   3 TAB PO TID HTN  (Reported)
Isosorbide Mononitrate (Isosorbide Mononitrate ER) 60 MG TAB.ER.24H   1 TAB PO 
DAILY HTN  (Reported)
     HOLD FOR SBP<100MMGH
Metoprolol Tartrate (Lopressor) 50 MG TABLET   1 TAB PO DAILY HTN  (Reported)
Omega-3 Fatty Acids/Fish Oil (Fish Oil 1,000 MG Capsule) 340 MG-1,000 MG CAPSULE
  2 CAP PO DAILY SUPPLEMENT  (Reported)
Omeprazole 40 MG CAPSULE.DR   1 CAP PO DAILY ACID REFLUX  (Reported)
Ondansetron HCl (Zofran) 4 MG TABLET   1 TAB PO TID NAUSEA  (Reported)
Oxycodone HCl 5 MG TABLET   1 TAB PO Q8P PAIN MODERATE  (Reported)
Oxycodone HCl 5 MG TABLET   1 TAB PO TIDPRN PRN BACK PAIN
Polyethylene Glycol 3350 (Miralax) 17 GRAM/DOSE POWDER   17 GM PO BID GI
Prednisone 5 MG TABLET   1.5 TAB PO DAILY ARTHRITIS  (Reported)
Tramadol HCl 50 MG TABLET   1 TAB PO DAILY PAIN  (Reported)
Tramadol HCl 50 MG TABLET   1 TAB PO DAILY PRN BACK PAIN
 
Current Medications:
 Current Medications
 
 
  Sig/Jose E Start time  Last
 
Medication Dose Route Stop Time Status Admin
 
Albumin Human 12.5 GM TID 03/07 1000 AC 03/07
 
  IV   1539
 
Albumin Human 12.5 GM ONCE ONE 03/07 0445 DC 03/07
 
  IV 03/07 0446  0502
 
Albuterol Sulfate 3 ML Q4H PRN 03/07 0200 AC 
 
  INH   
 
Amlodipine Besylate 10 MG DAILY 03/07 1000 AC 03/07
 
  PO   1535
 
Cholecalciferol 1,000 IU BID 03/07 1000 AC 
 
  PO   
 
Clonidine 0.3 MG Q12 03/07 1000 AC 
 
  PO   
 
Cyclosporine 125 MG BID 03/06 2330 CAN 
 
  PO 03/06 2359  
 
Guaifenesin 10 ML Q6P PRN 03/07 0200 AC 
 
  PO   
 
Hydralazine HCl 75 MG TID 03/07 1000 AC 
 
  PO   
 
Isosorbide  60 MG DAILY 03/07 1000 AC 03/07
 
Mononitrate  PO   1532
 
Lidocaine 1 ML .STK-MED ONE 03/07 1300 DC 
 
  ID 03/07 1301  
 
Metoprolol Tartrate 50 MG DAILY 03/07 1000 AC 03/07
 
  PO   1532
 
Omeprazole 40 MG DAILY AC 03/07 0700 AC 03/07
 
  PO   0502
 
Ondansetron HCl 0 .STK-MED ONE 03/07 1257 DC 
 
  .ROUTE   
 
Ondansetron HCl 4 MG TID PRN 03/07 0200 AC 03/07
 
  PO   0249
 
Oxycodone HCl 5 MG Q8P PRN 03/07 0200 AC 03/07
 
  PO   0238
 
Polyethylene Glycol 17 GM BID 03/07 1000 AC 
 
  PO   
 
Potassium Chloride 20 MEQ  03/07 1345 CAN 
 
  IV   
 
Tramadol HCl 50 MG DAILY 03/07 1000 AC 
 
  PO   
 
 
 
 
Review of Systems
Review of Systems:
Complete 14 point ROS neg except as per HPI
 
Past History
 
Travel History
Traveled to [place] past 21 day No
 
Medical History
Blood Transfusion Hx: Yes
Neurological: CVA (? L CVA w/o residual)
EENT: NONE
Cardiovascular: CAD, hypertension, hyperlipidemia, PVD
Respiratory: NONE
Gastrointestinal: colon cancer
Hepatic: NONE
Renal: chronic kidney disease (stage IV)
Musculoskeletal: chronic back pain, degen joint disease, falls, gout
Psychiatric: NONE
Endocrine: thyroid disease
Blood Disorders: anemia
Cancer(s): basal cell carcinoma, colon/rectal cancer, endometrial cancer
GYN/Reproductive: NONE
 
Surgical History
Surgical History: CABG, hysterectomy, PARTIAL SIGMOIDECTOMY BACK SURGERY 
CAROTIDECTOMY LE STENT PLACEMENT
 
Family History
Relations & Conditions If Any:
MOTHER (Unknown - pt adolted).
FATHER (unknown - pt adopted).
 
 
Psychosocial History
Where Do You Live? Extended Care Facility
Who Do You Live With? self
Services at Home: None
Primary Language: English
Smoking Status: Former Smoker
ETOH Use: denies use
Living Will? yes
Power of /HCP? yes
Name of POA/HCP: Clare Alston- friend/POA
 
Functional Ability
ADLs
Independent: dressing, eating, toileting, bathing. 
Ambulation: cane
IADLs
Independent: shopping, housework, finances, food prep, telephone, transportation
, medication admin. 
 
Exam & Diagnostic Data
Vital Signs and I&O
Vital Signs
 
 
Date Time Temp Pulse Resp B/P B/P Pulse O2 O2 Flow FiO2
 
     Mean Ox Delivery Rate 
 
03/07 1435 97.9 65 18 127/68  97 Room Air  
 
03/07 0745 98.1 65 20 126/60  96 Room Air  
 
03/07 0222 98.1 64 20 121/63  96 Room Air  
 
03/07 0137 97.4 66 18 142/68  95 Room Air  
 
03/06 2342 97.6 67 18 146/67  95 Room Air  
 
03/06 2129 97.9 62 18 132/63  96 Room Air  
 
03/06 1851       Room Air  
 
03/06 1829 97.1 64 18 142/64  95 Room Air  
 
 
 Intake & Output
 
 
 03/07 1600 03/07 0400 03/06 1600 03/06 0400 03/05 1600 03/05 0400
 
Intake Total 720 50    
 
Output Total 100     
 
Balance 620 50    
 
       
 
Intake,      
 
Intake, Oral 600 50    
 
Output, Urine 100     
 
Patient 119 lb 118 lb    
 
Weight      
 
Weight Bed scale Bed scale    
 
Measurement      
 
Method      
 
 
 
Physical Exam:
Gen - OK appearing
Head - NCAT
Eyes - anicteric sclera, EOMI
Neck - supple, no LAD
CV - RRR, no m/r/g
Chest - clear anteriorly, no w/r/r
Abd - soft, NTND
Upper ext - warm, no edema
Lower ext - warm, trace edema
Skin - no rash or jaundice
Neuro - AOX3, grossly nonfocal
 
Results
Pertinent Lab Results:
 Laboratory Tests
 
 
 03/07 03/07 03/07 03/07
 
 1312 1312 1100 1100
 
Other Body Source    
 
  Fluid WBC Pending   
 
  Fld Total RBCs Counted Pending   
 
  Fluid Glucose (mg/dL)  68  
 
  Fluid Total Protein (g/dL)  < 2.0  
 
  Fluid Albumin (g/dL)  < 1.0  
 
  Fluid LDH (U/L)  211  
 
  Fluid Amylase (U/L)  < 30  
 
Urines    
 
  Urine Color (YEL,AMB,STR)   COREY 
 
  Urine Clarity (CLEAR)   HAZY  H 
 
  Urine pH (5.0 - 8.0)   6.0 
 
  Ur Specific Gravity (1.001 - 1.035)   1.015 
 
  Urine Protein (NEG,<30 MG/DL)   30  H 
 
  Urine Ketones (NEG)   NEG 
 
  Urine Nitrite (NEG)   POS  H 
 
  Urine Bilirubin (NEG)   NEG 
 
  Urine Urobilinogen (0.1  -  1.0 EU/dl)   0.2 
 
  Ur Leukocyte Esterase (NEG)   MOD  H 
 
  Ur Microscopic   SEDIMENT EXAMINED 
 
  Urine WBC (0 - 2 /HPF)   25-50  H 
 
  Ur Epithelial Cells (NONE,FEW)   FEW 
 
  Urine Bacteria (NEG/NONE)   MANY  H 
 
  Urine Hemoglobin (NEG)   NEG 
 
  Ur Random Creatinine (mg/dL)    75.3
 
  Ur Random Sodium (30 - 90 mmol/L)    25  L
 
  Ur Random Potassium (mmol/L)    19.0
 
  Fraction Sodium Excret (<1% %)    0.6
 
  Urine Glucose (N MG/DL)   NEG 
 
 
 
 
 03/07 03/07 03/07
 
 0745 0747 0215
 
Chemistry   
 
  Sodium (137 - 145 mmol/L) 133  L  
 
  Potassium (3.5 - 5.1 mmol/L) 3.9  
 
  Chloride (98 - 107 mmol/L) 104  
 
  Carbon Dioxide (22 - 30 mmol/L) 17  L  
 
  Anion Gap (5 - 16) 12  
 
  BUN (7 - 17 mg/dL) 64  H  
 
  Creatinine (0.5 - 1.0 mg/dL) 2.3  H  
 
  Estimated GFR (>60 ml/min) 21  L  
 
  BUN/Creatinine Ratio (7 - 25 %) 27.8  H  
 
  Total Bilirubin (0.2 - 1.3 mg/dL) 0.4  
 
  Direct Bilirubin (< 0.4 mg/dL) 0.4  
 
  AST (14 - 36 U/L) 19  
 
  ALT (9 - 52 U/L) 17  
 
  Alkaline Phosphatase (<127 U/L) 192  H  
 
  Troponin I (< 0.11 ng/ml) < 0.01 Cancelled Cancelled
 
  Total Protein (6.3 - 8.2 g/dL) 4.4  L  
 
  Albumin (3.5 - 5.0 g/dL) 2.2  L  
 
Hematology   
 
  CBC w Diff NO MAN DIFF REQ  
 
  WBC (4.8 - 10.8 /CUMM) 8.9  
 
  RBC (4.20 - 5.40 /CUMM) 2.72  L  
 
  Hgb (12.0 - 16.0 G/DL) 8.2  L  
 
  Hct (37 - 47 %) 24.3  L  
 
  MCV (81.0 - 99.0 FL) 89.3  
 
  MCH (27.0 - 31.0 PG) 30.1  
 
  MCHC (33.0 - 37.0 G/DL) 33.7  
 
  RDW (11.5 - 14.5 %) 14.6  H  
 
  Plt Count (130 - 400 /CUMM) 259  
 
  MPV (7.4 - 10.4 FL) 7.0  L  
 
  Gran % (42.2 - 75.2 %) 85.7  H  
 
  Lymphocytes % (20.5 - 51.1 %) 6.8  L  
 
  Monocytes % (1.7 - 9.3 %) 7.3  
 
  Eosinophils % (0 - 5 %) 0.1  
 
  Basophils % (0.0 - 2.0 %) 0.1  
 
  Absolute Granulocytes (1.4 - 6.5 /CUMM) 7.6  H  
 
  Absolute Lymphocytes (1.2 - 3.4 /CUMM) 0.6  L  
 
  Absolute Monocytes (0.10 - 0.60 /CUMM) 0.6  
 
  Absolute Eosinophils (0.0 - 0.7 /CUMM) 0  
 
  Absolute Basophils (0.0 - 0.2 /CUMM) 0  
 
 
 
 
 03/06 03/06
 
 2240 1915
 
Chemistry  
 
  Sodium (137 - 145 mmol/L)  132  L
 
  Potassium (3.5 - 5.1 mmol/L)  4.4
 
  Chloride (98 - 107 mmol/L)  101
 
  Carbon Dioxide (22 - 30 mmol/L)  19  L
 
  Anion Gap (5 - 16)  11
 
  BUN (7 - 17 mg/dL)  66  H
 
  Creatinine (0.5 - 1.0 mg/dL)  2.2  H
 
  Estimated GFR (>60 ml/min)  22  L
 
  BUN/Creatinine Ratio (7 - 25 %)  30.0  H
 
  Glucose (65 - 99 mg/dL)  76
 
  Calcium (8.4 - 10.2 mg/dL)  9.3
 
  Total Bilirubin (0.2 - 1.3 mg/dL)  0.6
 
  AST (14 - 36 U/L)  24
 
  ALT (9 - 52 U/L)  27
 
  Alkaline Phosphatase (<127 U/L)  230  H
 
  Troponin I (< 0.11 ng/ml) 0.01 0.02
 
  Total Protein (6.3 - 8.2 g/dL)  5.0  L
 
  Albumin (3.5 - 5.0 g/dL)  2.4  L
 
  Globulin (1.9 - 4.2 gm/dL)  2.6
 
  Albumin/Globulin Ratio (1.1 - 2.2 %)  0.9  L
 
Hematology  
 
  CBC w Diff  NO MAN DIFF REQ
 
  WBC (4.8 - 10.8 /CUMM)  8.7
 
  RBC (4.20 - 5.40 /CUMM)  3.40  L
 
  Hgb (12.0 - 16.0 G/DL)  10.2  L
 
  Hct (37 - 47 %)  30.7  L
 
  MCV (81.0 - 99.0 FL)  90.4
 
  MCH (27.0 - 31.0 PG)  30.1
 
  MCHC (33.0 - 37.0 G/DL)  33.3
 
  RDW (11.5 - 14.5 %)  14.7  H
 
  Plt Count (130 - 400 /CUMM)  312
 
  MPV (7.4 - 10.4 FL)  6.9  L
 
  Gran % (42.2 - 75.2 %)  88.6  H
 
  Lymphocytes % (20.5 - 51.1 %)  6.7  L
 
  Monocytes % (1.7 - 9.3 %)  4.7
 
  Eosinophils % (0 - 5 %)  0
 
  Basophils % (0.0 - 2.0 %)  0
 
  Absolute Granulocytes (1.4 - 6.5 /CUMM)  7.7  H
 
  Absolute Lymphocytes (1.2 - 3.4 /CUMM)  0.6  L
 
  Absolute Monocytes (0.10 - 0.60 /CUMM)  0.4
 
  Absolute Eosinophils (0.0 - 0.7 /CUMM)  0
 
  Absolute Basophils (0.0 - 0.2 /CUMM)  0
 
 
 
Imaging/Other Studies:
EXAM TYPE: RAD - XRY-PORTABLE CHEST XRAY
 
EXAMINATION:
XR PORTABLE CHEST
 
CLINICAL INFORMATION:
Diminished breath sounds
 
COMPARISON:
11/16/2016
 
TECHNIQUE:
Portable frontal view of the chest was obtained.
 
FINDINGS:
Slight apical lordotic positioning of the patient. Lungs are well expanded
and without interstitial edema, focal consolidation or pleural effusion.
Cardiac silhouette is normal in size. Surgical changes from remote coronary
artery bypass grafting with intact sternotomy wires in place. No acute
osseous findings. Chronic osteoarthritis of glenohumeral joints.
 
IMPRESSION:
Surgical changes from prior CABG. No acute cardiopulmonary findings compared
11/16/2016.
 
EXAM TYPE: MRI - MRI-ABD W/O-W THOM
 
EXAMINATION:
MR ABDOMEN WITHOUT AND WITH CONTRAST
 
CLINICAL INFORMATION:
Liver mass
 
COMPARISON:
CT abdomen and pelvis dated 1/29/2018 and 9/28/2016, and CT chest dated
11/16/2016
 
TECHNIQUE:
MRI of the abdomen before and after the IV administration of 5 mL of Gadavist
was obtained using routine sequences.
 
FINDINGS:
VISUALIZED CHEST: Trace bilateral pleural effusions.
 
LIVER, GALLBLADDER, AND BILIARY TREE: There are multiple lesions demonstrated
within the liver, predominantly in the anterior aspect of the right hepatic
lobe. The dominant mass measures 7.2 x 6.3 x 6.6 cm (AP by TRV by CC).
Additional separate small lesions are present. These lesions demonstrate mild
T2 signal hyperintensity, arterial phase peripheral enhancement and become
ill-defined and heterogeneous on delayed sequences. There is layering T1
hyperintense signal in the gallbladder which may represent sludge.
Gallbladder wall is not significantly thickened. There is no biliary ductal
dilation.
 
PANCREAS: There is some parenchymal atrophy. Small lobulated cystic lesion
noted at the inferior aspect of the mid pancreatic body. This is otherwise
not well visualized.
 
SPLEEN: Normal size.  No focal lesion.
 
ADRENAL GLANDS: Normal; no mass.
 
KIDNEYS AND URETERS: Both kidneys are mildly lobulated with suggestion of
cortical thinning. Multiple bilateral renal cysts are noted. Additional T1
hyperintense, T2 hypointense cyst in the upper pole of the right kidney
likely represents a hemorrhagic cyst. There is no hydronephrosis.
 
GASTROINTESTINAL TRACT: Unremarkable as visualized.
 
ABDOMINAL WALL: There is a subxiphoid ventral hernia which contains a
moderate amount of ascitic fluid.
 
LYMPHOVASCULAR STRUCTURES: No abdominal aortic aneurysm. Severe aortic
atherosclerosis better seen on recent CT. The IVC is patent.
 
FREE FLUID: A large volume of ascites is present.
 
OSSEOUS STRUCTURES: No acute or suspicious osseous abnormality is seen.
 
IMPRESSION:
Multiple hepatic lesions are seen which demonstrate heterogeneous
enhancement. Given interval enlargement and increased number of lesions since
CT dating back to 09/28/2016, findings are most concerning for hepatocellular
carcinoma. Metastatic disease is in the differential.
 
Large volume ascites. Fluid extends into a subxiphoid ventral hernia.
 
Multiple renal cysts. Possible hemorrhagic cyst in the upper pole of the
right kidney.
 
Small lobulated cystic lesion noted at the inferior aspect of the mid
pancreatic body. Please note that the current study is not optimized for
characterization of pancreatic lesions.
 
Possible gallbladder sludge.
 
Assessment/Plan
 
Assessment/Recommendations
Assessment:
CKD - Stage III CKD with minimal proteinuria thought to be 2/2 hypertensive 
nephrosclerosis.  Her SCr had been better recently than in the past likely 
reflective of decreased body mass.
 
RAHUL - Appears to be 2/2 pre-renal azotemia.  Has been started on 25% albumin 
given her liver disease.  I agree that this is likely pre-renal azotemia - she 
has minimal edema - I think that switching to some crystalloids would not be 
unreasonable (will favor LR).  By history, if her BP did drop during recent 6.5L
LVP, that could have led to some ischemic ATN.  UA c/w UTI although not febrile 
nor does she have an elevated WBC to invoke ATN from sepsis - pending ascitic 
fluid analysis to evaluate for SBP.  Abd not tense enough to suggest abd 
compartment syndrome.  Suspicion for urinary obstruction low given recent 
imaging without hydronephrosis.  With normal blood pressure, detectable urine 
sodium, and normal Bili - doubt HRS.
 
Liver mass with ascites - SAAG c/w portal hypertension.  Diagnosis not yet 
complete.  GI following.
Recommendations:
-OK to cont 25% IV albumin but can also switch to LR at 100cc/hr
-f/u WBC/gram stain from ascitic fluid
-Bladder scan to check for PVR
-UCx
-Diagnostic evaluation of liver masses as per GI
-Uprot, UCr
 
Please call  with ?'s

## 2018-03-07 NOTE — CONS- GASTROENTEROLOGY
General Information and HPI
 
Consulting Request
Date of Consult: 03/07/18
Requested By:
Bello PINTO,Clare
 
Reason for Consult:
Ascites, liver masses on MRI.
Source of Information: patient, old records
Exam Limitations: poor historian
History of Present Illness:
Ms. Perez is a 77 year old female with multiple medical problems including 
uterine and colon ca s/p resection, CKD, chronic back pain, PVD, HTN and 
hyperlipidemia who was recently discharged from  to rehab for back pain, lower
extremity weakness, gait instability, and increased abdominal girth who was sent
back in to  last night for worsening ascites, renal failure, nausea and 
vomiting. On her last admission she had a ct scan which showed a liver mass and 
this was followed with an MRI as an outpatient which confirmed the findings.  
She has continued to have increased abdominal girth and she is fatigued, but she
is without any abdominal pain and the increasing ascites is not leading to any 
significant respiratory distress.  She has had a poor appetite and some nausea 
and she had one episode of bilious vomiting on the ambulance ride over here that
hasn't persisted.  Last night in the ER she was hemodynamically stable and 
afebrile.  She had ascites on PE and she was noted to have some renal 
insufficiency from when she was last here.  She was admittted and started on IV 
albumin and a diagnostic tap was deferred as she requested it be done by IR.  
There were no actue events overnight.    
 
Allergies/Medications
Allergies:
Coded Allergies:
Sulfa (Sulfonamide Antibiotics) (Severe, HIVES 11/20/16)
 
Home Med List:
Albuterol Sulfate 0.63 MG/3 ML VIAL.NEB   1 Vial INH/SOL 4 TIMES/DAY PRN COPD  (
Reported)
Amlodipine Besylate 10 MG TABLET   1 TAB PO DAILY HTN  (Reported)
Aspirin (Aspirin*) 81 MG TAB.CHEW   1 TAB PO DAILY HEART HEALTH  (Reported)
Cholecalciferol (Vitamin D3) (Vitamin D3) 2,000 UNIT CAPSULE   1 CAP PO BID 
SUPPLEMENT  (Reported)
Clonidine HCl 0.3 MG TABLET   1 TAB PO Q12 HTN  (Reported)
Guaifenesin/Dextromethorphan (Robitussin Cough-Chest Dm Liq) 100 MG-5 MG/5 ML 
LIQUID   10 ML PO Q4 PRN COUGH  (Reported)
Hydralazine HCl 25 MG TABLET   3 TAB PO TID HTN  (Reported)
Isosorbide Mononitrate (Isosorbide Mononitrate ER) 60 MG TAB.ER.24H   1 TAB PO 
DAILY HTN  (Reported)
     HOLD FOR SBP<100MMGH
Metoprolol Tartrate (Lopressor) 50 MG TABLET   1 TAB PO DAILY HTN  (Reported)
Omega-3 Fatty Acids/Fish Oil (Fish Oil 1,000 MG Capsule) 340 MG-1,000 MG CAPSULE
  2 CAP PO DAILY SUPPLEMENT  (Reported)
Omeprazole 40 MG CAPSULE.DR   1 CAP PO DAILY ACID REFLUX  (Reported)
Ondansetron HCl (Zofran) 4 MG TABLET   1 TAB PO TID NAUSEA  (Reported)
Oxycodone HCl 5 MG TABLET   1 TAB PO Q8P PAIN MODERATE  (Reported)
Polyethylene Glycol 3350 (Miralax) 17 GRAM/DOSE POWDER   17 GM PO BID GI
Tramadol HCl 50 MG TABLET   1 TAB PO DAILY PAIN  (Reported)
 
Current Medications:
 Current Medications
 
 
  Sig/Jose E Start time  Last
 
Medication Dose Route Stop Time Status Admin
 
Albumin Human 12.5 GM TID 03/07 1000 AC 
 
  IV   
 
Albumin Human 12.5 GM ONCE ONE 03/07 0445 DC 03/07
 
  IV 03/07 0446  0502
 
Albuterol Sulfate 3 ML Q4H PRN 03/07 0200 AC 
 
  INH   
 
Amlodipine Besylate 10 MG DAILY 03/07 1000 AC 
 
  PO   
 
Cholecalciferol 1,000 IU BID 03/07 1000 AC 
 
  PO   
 
Clonidine 0.3 MG Q12 03/07 1000 AC 
 
  PO   
 
Cyclosporine 125 MG BID 03/06 2330 CAN 
 
  PO 03/06 2359  
 
Guaifenesin 10 ML Q6P PRN 03/07 0200 AC 
 
  PO   
 
Hydralazine HCl 75 MG TID 03/07 1000 AC 
 
  PO   
 
Isosorbide  60 MG DAILY 03/07 1000 AC 
 
Mononitrate  PO   
 
Metoprolol Tartrate 50 MG DAILY 03/07 1000 AC 
 
  PO   
 
Omeprazole 40 MG DAILY AC 03/07 0700 AC 03/07
 
  PO   0502
 
Ondansetron HCl 4 MG TID PRN 03/07 0200 AC 03/07
 
  PO   0249
 
Oxycodone HCl 5 MG Q8P PRN 03/07 0200 AC 03/07
 
  PO   0238
 
Polyethylene Glycol 17 GM BID 03/07 1000 AC 
 
  PO   
 
Tramadol HCl 50 MG DAILY 03/07 1000 AC 
 
  PO   
 
 
 
 
Past History
 
Travel History
Traveled to [place] past 21 day No
 
Medical History
Blood Transfusion Hx: Yes
Neurological: CVA (? L CVA w/o residual)
EENT: NONE
Cardiovascular: CAD, hypertension, hyperlipidemia, PVD
Respiratory: NONE
Gastrointestinal: colon cancer
Hepatic: NONE
Renal: chronic kidney disease (stage IV)
Musculoskeletal: chronic back pain, degen joint disease, falls, gout
Psychiatric: NONE
Endocrine: thyroid disease
Blood Disorders: anemia
Cancer(s): basal cell carcinoma, colon/rectal cancer, endometrial cancer
GYN/Reproductive: NONE
 
Surgical History
Surgical History: CABG, hysterectomy, PARTIAL SIGMOIDECTOMY BACK SURGERY 
CAROTIDECTOMY LE STENT PLACEMENT
 
Family History
Relations & Conditions If Any:
MOTHER (Unknown - pt adolted).
FATHER (unknown - pt adopted).
 
 
Psychosocial History
Where Do You Live? Extended Care Facility
Who Do You Live With? self
Services at Home: None
Primary Language: English
Smoking Status: Former Smoker
ETOH Use: denies use
Living Will? yes
Power of /HCP? yes
Name of POA/HCP: Clare Alston- friend/POA
 
Functional Ability
ADLs
Independent: dressing, eating, toileting, bathing. 
Ambulation: cane
IADLs
Independent: shopping, housework, finances, food prep, telephone, transportation
, medication admin. 
 
Exam & Diagnostic Data
Vital Signs and I&O
Vital Signs
 
 
Date Time Temp Pulse Resp B/P B/P Pulse O2 O2 Flow FiO2
 
     Mean Ox Delivery Rate 
 
03/07 0222 98.1 64 20 121/63  96 Room Air  
 
03/07 0137 97.4 66 18 142/68  95 Room Air  
 
03/06 2342 97.6 67 18 146/67  95 Room Air  
 
03/06 2129 97.9 62 18 132/63  96 Room Air  
 
03/06 1851       Room Air  
 
03/06 1829 97.1 64 18 142/64  95 Room Air  
 
 
 Intake & Output
 
 
 03/07 1600 03/07 0400 03/06 1600 03/06 0400 03/05 1600 03/05 0400
 
Intake Total 480 50    
 
Output Total      
 
Balance 480 50    
 
       
 
Intake,      
 
Intake, Oral 360 50    
 
Patient 118 lb 118 lb    
 
Weight      
 
Weight Bed scale Bed scale    
 
Measurement      
 
Method      
 
 
 
 
Physical Exam
General Appearance: no apparent distress, comfortable, cachetic
Head: atraumatic, normal appearance
Eyes:
Bilateral: normal appearance. 
Ears, Nose, Throat: normal pharynx, normal ENT inspection
Neck: normal inspection, supple
Respiratory: quiet respiration, decreased breath sounds
Cardiovascular: regular rate/rhythm
Gastrointestinal: normal bowel sounds, soft, non-tender, distention
Rectal: deferred
Back: normal inspection
Extremities: pedal edema
Neurologic/Psych: no motor/sensory deficits, awake, alert, oriented x 3
Skin: intact, normal color, warm/dry
 
Results
Pertinent Lab Results:
 Laboratory Tests
 
 
 03/07 03/06 03/06
 
 0215 2240 1915
 
Chemistry   
 
  Sodium (137 - 145 mmol/L)   132  L
 
  Potassium (3.5 - 5.1 mmol/L)   4.4
 
  Chloride (98 - 107 mmol/L)   101
 
  Carbon Dioxide (22 - 30 mmol/L)   19  L
 
  Anion Gap (5 - 16)   11
 
  BUN (7 - 17 mg/dL)   66  H
 
  Creatinine (0.5 - 1.0 mg/dL)   2.2  H
 
  Estimated GFR (>60 ml/min)   22  L
 
  BUN/Creatinine Ratio (7 - 25 %)   30.0  H
 
  Glucose (65 - 99 mg/dL)   76
 
  Calcium (8.4 - 10.2 mg/dL)   9.3
 
  Total Bilirubin (0.2 - 1.3 mg/dL)   0.6
 
  AST (14 - 36 U/L)   24
 
  ALT (9 - 52 U/L)   27
 
  Alkaline Phosphatase (<127 U/L)   230  H
 
  Troponin I (< 0.11 ng/ml) Cancelled 0.01 0.02
 
  Total Protein (6.3 - 8.2 g/dL)   5.0  L
 
  Albumin (3.5 - 5.0 g/dL)   2.4  L
 
  Globulin (1.9 - 4.2 gm/dL)   2.6
 
  Albumin/Globulin Ratio (1.1 - 2.2 %)   0.9  L
 
Hematology   
 
  CBC w Diff   NO MAN DIFF REQ
 
  WBC (4.8 - 10.8 /CUMM)   8.7
 
  RBC (4.20 - 5.40 /CUMM)   3.40  L
 
  Hgb (12.0 - 16.0 G/DL)   10.2  L
 
  Hct (37 - 47 %)   30.7  L
 
  MCV (81.0 - 99.0 FL)   90.4
 
  MCH (27.0 - 31.0 PG)   30.1
 
  MCHC (33.0 - 37.0 G/DL)   33.3
 
  RDW (11.5 - 14.5 %)   14.7  H
 
  Plt Count (130 - 400 /CUMM)   312
 
  MPV (7.4 - 10.4 FL)   6.9  L
 
  Gran % (42.2 - 75.2 %)   88.6  H
 
  Lymphocytes % (20.5 - 51.1 %)   6.7  L
 
  Monocytes % (1.7 - 9.3 %)   4.7
 
  Eosinophils % (0 - 5 %)   0
 
  Basophils % (0.0 - 2.0 %)   0
 
  Absolute Granulocytes (1.4 - 6.5 /CUMM)   7.7  H
 
  Absolute Lymphocytes (1.2 - 3.4 /CUMM)   0.6  L
 
  Absolute Monocytes (0.10 - 0.60 /CUMM)   0.4
 
  Absolute Eosinophils (0.0 - 0.7 /CUMM)   0
 
  Absolute Basophils (0.0 - 0.2 /CUMM)   0
 
 
2/5/18  CEA-11.7
Imaging/Other Studies:
SERVICE DATE: 02/27/
EXAM TYPE: MRI - MRI-ABD W/O-W THOM
 
EXAMINATION:
MR ABDOMEN WITHOUT AND WITH CONTRAST
 
CLINICAL INFORMATION:
Liver mass
 
COMPARISON:
CT abdomen and pelvis dated 1/29/2018 and 9/28/2016, and CT chest dated
11/16/2016
 
TECHNIQUE:
MRI of the abdomen before and after the IV administration of 5 mL of Gadavist
was obtained using routine sequences.
 
FINDINGS:
VISUALIZED CHEST: Trace bilateral pleural effusions.
 
LIVER, GALLBLADDER, AND BILIARY TREE: There are multiple lesions demonstrated
within the liver, predominantly in the anterior aspect of the right hepatic
lobe. The dominant mass measures 7.2 x 6.3 x 6.6 cm (AP by TRV by CC).
Additional separate small lesions are present. These lesions demonstrate mild
T2 signal hyperintensity, arterial phase peripheral enhancement and become
ill-defined and heterogeneous on delayed sequences. There is layering T1
hyperintense signal in the gallbladder which may represent sludge.
Gallbladder wall is not significantly thickened. There is no biliary ductal
dilation.
 
PANCREAS: There is some parenchymal atrophy. Small lobulated cystic lesion
noted at the inferior aspect of the mid pancreatic body. This is otherwise
not well visualized.
 
SPLEEN: Normal size.  No focal lesion.
 
ADRENAL GLANDS: Normal; no mass.
 
KIDNEYS AND URETERS: Both kidneys are mildly lobulated with suggestion of
cortical thinning. Multiple bilateral renal cysts are noted. Additional T1
hyperintense, T2 hypointense cyst in the upper pole of the right kidney
likely represents a hemorrhagic cyst. There is no hydronephrosis.
 
GASTROINTESTINAL TRACT: Unremarkable as visualized.
 
ABDOMINAL WALL: There is a subxiphoid ventral hernia which contains a
moderate amount of ascitic fluid.
 
LYMPHOVASCULAR STRUCTURES: No abdominal aortic aneurysm. Severe aortic
atherosclerosis better seen on recent CT. The IVC is patent.
 
FREE FLUID: A large volume of ascites is present.
 
OSSEOUS STRUCTURES: No acute or suspicious osseous abnormality is seen.
 
IMPRESSION:
Multiple hepatic lesions are seen which demonstrate heterogeneous
enhancement. Given interval enlargement and increased number of lesions since
CT dating back to 09/28/2016, findings are most concerning for hepatocellular
carcinoma. Metastatic disease is in the differential.
 
Large volume ascites. Fluid extends into a subxiphoid ventral hernia.
 
Multiple renal cysts. Possible hemorrhagic cyst in the upper pole of the
right kidney.
 
Small lobulated cystic lesion noted at the inferior aspect of the mid
pancreatic body. Please note that the current study is not optimized for
characterization of pancreatic lesions.
 
Possible gallbladder sludge.
 
 
Assessment/Plan
Assessment/Recommendations:
Assessment: Ms. Perez is a 77 year old female with multiple medical problems 
who is currently in rehab who was sent back into  for worsening ascites and 
renal insufficiency.  She is currently comfortable and the ascites is not 
leading to any respiratory distress so while she may benefit from a large volume
paracentesis would recommend holding off on that for now until SBP has been 
ruled out.  Her renal insufficiency is likely secondary to prerenal azotemia, 
but it is still possible she could be developing hepatorenal syndrome.  Her 
recent MRI is concerning for an underlying malignancy which would most likely be
metastatic rather than a primary liver tumor, but that is still possible.  Her 
CEA is elevated and she does have a history of colon cancer which may now be 
metastatic. At some point it may be reasonable to repeat her colonoscopy to make
a diagnosis, however considering the liver lesions on MRI would recommend 
biopsying one of those lesions first as this would be less invasive and I'm not 
certain she will tolerate a bowel prep too well.
 
Recommendations:
1.  Perform a diagnostic paracentesis to rule out SBP.
2.  Continue IV albumin 25 g every 8 hours for the next 24-48 hours.
3.  Maintain her on a low-sodium diet.
4.  Check spot urine lites.
5.  Follow renal function and electrolytes.
6.  Contact IR for potentially biopsying one of her liver lesions.
 
I will continue to follow this patient and make further condition clinical 
course and results of repeat blood work.
 
 
Consult Acknowledgment
- Thank you for your consult request.

## 2018-03-07 NOTE — CONS- WOUND CARE
General Information and HPI
 
Consulting Request
Date of Consult: 03/07/18
Requested By:
James Camp MD
 
Reason for Consult:
Sacral ulcer present on admission
History of Present Illness:
Patient is a 77-year-old with history of colon cancer cervical cancer enlarging 
liver mass admitted with acute kidney injury and concern for hepatorenal 
syndrome.  She is found to have a small sacral decubitus ulcer present on 
admission.
 
Allergies/Medications
Allergies:
Coded Allergies:
Sulfa (Sulfonamide Antibiotics) (Severe, HIVES 11/20/16)
 
Home Med List:
Albuterol Sulfate 0.63 MG/3 ML VIAL.NEB   1 Vial INH/SOL 4 TIMES/DAY PRN COPD  (
Reported)
Amlodipine Besylate 10 MG TABLET   1 TAB PO DAILY HTN  (Reported)
Aspirin (Aspirin*) 81 MG TAB.CHEW   1 TAB PO DAILY HEART HEALTH  (Reported)
Cholecalciferol (Vitamin D3) (Vitamin D3) 2,000 UNIT CAPSULE   1 CAP PO BID 
SUPPLEMENT  (Reported)
Clonidine HCl 0.3 MG TABLET   1 TAB PO Q12 HTN  (Reported)
Guaifenesin/Dextromethorphan (Robitussin Cough-Chest Dm Liq) 100 MG-5 MG/5 ML 
LIQUID   10 ML PO Q4 PRN COUGH  (Reported)
Hydralazine HCl 25 MG TABLET   3 TAB PO TID HTN  (Reported)
Isosorbide Mononitrate (Isosorbide Mononitrate ER) 60 MG TAB.ER.24H   1 TAB PO 
DAILY HTN  (Reported)
     HOLD FOR SBP<100MMGH
Metoprolol Tartrate (Lopressor) 50 MG TABLET   1 TAB PO DAILY HTN  (Reported)
Omega-3 Fatty Acids/Fish Oil (Fish Oil 1,000 MG Capsule) 340 MG-1,000 MG CAPSULE
  2 CAP PO DAILY SUPPLEMENT  (Reported)
Omeprazole 40 MG CAPSULE.DR   1 CAP PO DAILY ACID REFLUX  (Reported)
Ondansetron HCl (Zofran) 4 MG TABLET   1 TAB PO TID NAUSEA  (Reported)
Oxycodone HCl 5 MG TABLET   1 TAB PO Q8P PAIN MODERATE  (Reported)
Oxycodone HCl 5 MG TABLET   1 TAB PO TIDPRN PRN BACK PAIN
Polyethylene Glycol 3350 (Miralax) 17 GRAM/DOSE POWDER   17 GM PO BID GI
Prednisone 5 MG TABLET   1.5 TAB PO DAILY ARTHRITIS  (Reported)
Tramadol HCl 50 MG TABLET   1 TAB PO DAILY PAIN  (Reported)
Tramadol HCl 50 MG TABLET   1 TAB PO DAILY PRN BACK PAIN
 
 
Review of Systems
Review of Systems:
Noncontributory
 
Past History
 
Travel History
Traveled to [place] past 21 day No
 
Medical History
Blood Transfusion Hx: Yes
Neurological: CVA (? L CVA w/o residual)
EENT: NONE
Cardiovascular: CAD, hypertension, hyperlipidemia, PVD
Respiratory: NONE
Gastrointestinal: colon cancer
Hepatic: NONE
Renal: chronic kidney disease (stage IV)
Musculoskeletal: chronic back pain, degen joint disease, falls, gout
Psychiatric: NONE
Endocrine: thyroid disease
Blood Disorders: anemia
Cancer(s): basal cell carcinoma, colon/rectal cancer, endometrial cancer
GYN/Reproductive: NONE
 
Surgical History
Surgical History: CABG, hysterectomy, PARTIAL SIGMOIDECTOMY BACK SURGERY 
CAROTIDECTOMY LE STENT PLACEMENT
 
Family History
Relations & Conditions If Any:
MOTHER (Unknown - pt adolted).
FATHER (unknown - pt adopted).
 
 
Psychosocial History
Where Do You Live? Extended Care Facility
Who Do You Live With? self
Services at Home: None
Primary Language: English
Smoking Status: Former Smoker
ETOH Use: denies use
Living Will? yes
Power of /HCP? yes
Name of POA/HCP: Clare Alston- friend/POA
 
Functional Ability
ADLs
Independent: dressing, eating, toileting, bathing. 
Ambulation: cane
IADLs
Independent: shopping, housework, finances, food prep, telephone, transportation
, medication admin. 
 
Exam & Diagnostic Data
Vital Signs and I&O
 Vital Signs
 
 
 Result Date Time
 
Pulse Ox 96 03/07 0745
 
B/P 126/60 03/07 0745
 
O2 Delivery Room Air 03/07 0745
 
Temp 98.1 03/07 0745
 
Pulse 65 03/07 0745
 
Resp 20 03/07 0745
 
 
Exam of her coccyx shows there to be a 0.5 x 0.5 cm unstageable pressure ulcer 
with 100% yellow fill there is no undermining sinus tracking or exposed bone.  
There is no periwound erythema
 
Assessment/Plan
Impression/Plan:
77-year-old with acute kidney injury in the setting of probable metastatic colon
cancer versus primary hepatocellular carcinoma was found to have a small 
unstageable pressure ulcer of her sacrum present on admission.  She complained 
of bilateral hip pain but there are no open ulcers present over her greater 
trochanters.  Recommend offloading and a DuoDERM can be placed over the sacral 
ulcer.  Patient should be frequently repositioned to avoid ulceration of her 
greater trochanters given her marked muscle wasting
 
 
Consult Acknowledgment
- Thank you for your consult request.

## 2018-03-07 NOTE — ULTRASOUND REPORT
EXAMINATION:
PARACENTESIS
 
CLINICAL INFORMATION:
77-year-old female with decompensated liver failure. Concern for SBP
 
COMPARISON:
MRI abdomen 02/27/2018
 
TECHNIQUE:
Indirect ultrasound guidance using a 20-gauge spinal needle
 
FINDINGS:
Informed consent was obtained from the patient prior to the procedure. During
this process, the procedure alternatives were explained, along with the
intended outcome and benefits. The risks of the procedure, as well as the
risk of not doing the procedure, was discussed. The patient was given the
opportunity to ask questions regarding the procedure and appeared competent
to make medical decisions. A signed consent form which documents this
discussion was placed in the medical record.
 
Ultrasound evaluation of the abdomen for ascites was performed. Moderate
amount of ascites is noted in the right  lower quadrant. The site was marked.
A timeout procedure was performed. The area was prepped and draped in usual
sterile fashion.
 
Using standard interventional and sterile techniques, lidocaine was used to
anesthetize the region.  A 20-gauge spinal needle was advanced approximately
3 cm. The inner portion of the needle was removed and approximately 60 mL's
of fluid was aspirated. The fluid was thin and light yellow in color. Per the
request of the ordering team, only a diagnostic paracentesis was performed
today. The needle was removed. Pressure was held and hemostasis was achieved.
Dermabond was placed at the needle site.
 
The patient was discharged from the department in stable condition.
 
COMPLICATIONS:
None.
 
IMPRESSION:
Successful ultrasound-guided diagnostic paracentesis.

## 2018-03-08 VITALS — DIASTOLIC BLOOD PRESSURE: 68 MMHG | SYSTOLIC BLOOD PRESSURE: 139 MMHG

## 2018-03-08 VITALS — SYSTOLIC BLOOD PRESSURE: 127 MMHG | DIASTOLIC BLOOD PRESSURE: 64 MMHG

## 2018-03-08 VITALS — SYSTOLIC BLOOD PRESSURE: 120 MMHG | DIASTOLIC BLOOD PRESSURE: 60 MMHG

## 2018-03-08 LAB
ABSOLUTE GRANULOCYTE CT: 7.8 /CUMM (ref 1.4–6.5)
BASOPHILS # BLD: 0 /CUMM (ref 0–0.2)
BASOPHILS NFR BLD: 0.2 % (ref 0–2)
EOSINOPHIL # BLD: 0 /CUMM (ref 0–0.7)
EOSINOPHIL NFR BLD: 0 % (ref 0–5)
ERYTHROCYTE [DISTWIDTH] IN BLOOD BY AUTOMATED COUNT: 14.4 % (ref 11.5–14.5)
GRANULOCYTES NFR BLD: 87.1 % (ref 42.2–75.2)
HCT VFR BLD CALC: 24.1 % (ref 37–47)
LYMPHOCYTES # BLD: 0.6 /CUMM (ref 1.2–3.4)
MCH RBC QN AUTO: 30.1 PG (ref 27–31)
MCHC RBC AUTO-ENTMCNC: 33.6 G/DL (ref 33–37)
MCV RBC AUTO: 89.5 FL (ref 81–99)
MONOCYTES # BLD: 0.5 /CUMM (ref 0.1–0.6)
PLATELET # BLD: 228 /CUMM (ref 130–400)
PMV BLD AUTO: 6.9 FL (ref 7.4–10.4)
PROTHROMBIN TIME: 13 SEC (ref 9.4–12.5)
RED BLOOD CELL CT: 2.69 /CUMM (ref 4.2–5.4)
WBC # BLD AUTO: 8.9 /CUMM (ref 4.8–10.8)

## 2018-03-08 NOTE — PN- NEPHROLOGY
Assessment/Plan Nephrology
Assessment:
CKD - Stage III CKD with minimal proteinuria thought to be 2/2 hypertensive 
nephrosclerosis.  Her SCr had been better recently than in the past likely 
reflective of decreased body mass.
 
RAHUL - Appears to be 2/2 pre-renal azotemia perhaps with some superimposed 
ischemic ATN from volume depletion.  Has been started on 25% albumin given her 
liver disease with stabilization of her renal function (would be reasonable to 
switch to crystalloid).
 
Liver mass with ascites - SAAG c/w portal hypertension.  Diagnosis not yet 
complete pending biopsy.  GI following.
Suggestion:
-OK to switch to LR at 100cc/hr
-Bladder scan to check for PVR
-f/u UCx
-Diagnostic evaluation of liver masses as per GI
-Uprot, UCr
 
Please call  with ?'s
 
 
Subjective
Subjective:
Diagnostic paracentesis yesterday - 60cc - 67 WBC - SAAG suggestive of portal 
HTN
SCr 2.2
Has continued on TID IV albumin
No specific compalints
 
 
 
Objective
Vital Signs and I&Os
Vital Signs
 
 
Date Time Temp Pulse Resp B/P B/P Pulse O2 O2 Flow FiO2
 
     Mean Ox Delivery Rate 
 
03/08 0921 98.9 67 20 120/60     
 
03/08 0920 98.9 67 20 120/60     
 
03/08 0919 98.9 67 20 120/60     
 
03/08 0919 98.9 67 20 120/60     
 
03/08 0919 98.9 67 20 120/60     
 
03/08 0645 98.9 67 20 120/60  91 Room Air  
 
03/07 2209 98.1 62 18 157/75  97 Room Air  
 
03/07 2156  62  157/75     
 
03/07 2156  62  157/75     
 
03/07 1435 97.9 65 18 127/68  97 Room Air  
 
 
 Intake & Output
 
 
 03/08 1600 03/08 0400 03/07 1600 03/07 0400 03/06 1600 03/06 0400
 
Intake Total 360 350 720 50  
 
Output Total   100   
 
Balance 360 350 620 50  
 
       
 
Intake, IV  110 120   
 
Intake, Oral 360 240 600 50  
 
Output, Urine   100   
 
Patient 118 lb  119 lb 118 lb  
 
Weight      
 
Weight Bed scale  Bed scale Bed scale  
 
Measurement      
 
Method      
 
 
 
Physical Exam:
Gen - Ok appearing
HEENT - supple
CV - RRR, no m/r/g
Chest - clear, no w/r/r
Abd - soft, NTND
Ext - trace edema in feet/ankles
Neuro - AOX3, grossly nonfocal
Current Medications:
 Current Medications
 
 
  Sig/Jose E Start time  Last
 
Medication Dose Route Stop Time Status Admin
 
Albumin Human 12.5 GM TID 03/07 1000 AC 03/08
 
  IV   0919
 
Albuterol Sulfate 3 ML Q4H PRN 03/07 0200 AC 
 
  INH   
 
Amlodipine Besylate 10 MG DAILY 03/07 1000 AC 03/08
 
  PO   0919
 
Cholecalciferol 1,000 IU BID 03/07 1000 AC 03/08
 
  PO   0919
 
Clonidine 0.3 MG Q12 03/07 1000 AC 03/07
 
  PO   2156
 
Guaifenesin 10 ML Q6P PRN 03/07 0200 AC 
 
  PO   
 
Heparin Sodium  5,000 UNIT Q8 03/07 2200 AC 03/07
 
(Porcine)  SC   2156
 
Hydralazine HCl 75 MG TID 03/07 1000 AC 03/07
 
  PO   2156
 
Isosorbide  60 MG DAILY 03/07 1000 AC 03/08
 
Mononitrate  PO   0919
 
Lidocaine 1 ML .STK-MED ONE 03/07 1300 DC 
 
  ID 03/07 1301  
 
Metoprolol Tartrate 50 MG DAILY 03/07 1000 AC 03/08
 
  PO   0919
 
Omeprazole 40 MG DAILY AC 03/07 0700 AC 03/08
 
  PO   0558
 
Ondansetron HCl 0 .STK-MED ONE 03/07 1257 DC 
 
  .ROUTE   
 
Ondansetron HCl 4 MG TID PRN 03/07 0200 AC 03/07
 
  PO   0249
 
Oxycodone HCl 5 MG Q8P PRN 03/07 0200 AC 03/08
 
  PO   0124
 
Polyethylene Glycol 17 GM BID 03/07 1000 AC 03/07
 
  PO   2155
 
Potassium Chloride 20 MEQ  03/07 1345 CAN 
 
  IV   
 
Tramadol HCl 50 MG DAILY 03/07 1000 AC 03/08
 
  PO   0918
 
 
 
 
Results
Pertinent Lab Results:
 Laboratory Tests
 
 
 03/08 03/07 03/07
 
 0810 1959 1312
 
Chemistry   
 
  Sodium (137 - 145 mmol/L) 136  L  
 
  Potassium (3.5 - 5.1 mmol/L) 4.0  
 
  Chloride (98 - 107 mmol/L) 104  
 
  Carbon Dioxide (22 - 30 mmol/L) 20  L  
 
  Anion Gap (5 - 16) 12  
 
  BUN (7 - 17 mg/dL) 61  H  
 
  Creatinine (0.5 - 1.0 mg/dL) 2.2  H  
 
  Estimated GFR (>60 ml/min) 22  L  
 
  BUN/Creatinine Ratio (7 - 25 %) 27.7  H  
 
Coagulation   
 
  PT (9.4 - 12.5 SEC) 13.0  H Cancelled 
 
  INR (0.90 - 1.19) 1.19 Cancelled 
 
Hematology   
 
  CBC w Diff MAN DIFF ORDERED  
 
  WBC (4.8 - 10.8 /CUMM) 8.9  
 
  RBC (4.20 - 5.40 /CUMM) 2.69  L  
 
  Hgb (12.0 - 16.0 G/DL) 8.1  L  
 
  Hct (37 - 47 %) 24.1  L  
 
  MCV (81.0 - 99.0 FL) 89.5  
 
  MCH (27.0 - 31.0 PG) 30.1  
 
  MCHC (33.0 - 37.0 G/DL) 33.6  
 
  RDW (11.5 - 14.5 %) 14.4  
 
  Plt Count (130 - 400 /CUMM) 228  
 
  MPV (7.4 - 10.4 FL) 6.9  L  
 
  Gran % (42.2 - 75.2 %) 87.1  H  
 
  Lymphocytes % (20.5 - 51.1 %) 6.7  L  
 
  Monocytes % (1.7 - 9.3 %) 6.0  
 
  Eosinophils % (0 - 5 %) 0  
 
  Basophils % (0.0 - 2.0 %) 0.2  
 
  Absolute Granulocytes (1.4 - 6.5 /CUMM) 7.8  H  
 
  Absolute Lymphocytes (1.2 - 3.4 /CUMM) 0.6  L  
 
  Absolute Monocytes (0.10 - 0.60 /CUMM) 0.5  
 
  Absolute Eosinophils (0.0 - 0.7 /CUMM) 0  
 
  Absolute Basophils (0.0 - 0.2 /CUMM) 0  
 
  Poikilocytosis 2+  
 
  Anisocytosis 2+  
 
  Ovalocytes 2+  
 
  Jodi Cells 2+  
 
Other Body Source   
 
  Fluid WBC (0 - 5 /CUMM)   67  H
 
  Fld Mesothelial Cells (%)   57
 
  Fld Total RBCs Counted (0 /CUMM)   100  H
 
 
 
 
 03/07 03/07 03/07
 
 1312 1100 1100
 
Hematology   
 
  Lymphocytes (%) 12  
 
  % Normal PMNs (%) 31  
 
Other Body Source   
 
  Fluid Glucose (mg/dL) 68  
 
  Fluid Total Protein (g/dL) < 2.0  
 
  Fluid Albumin (g/dL) < 1.0  
 
  Fluid LDH (U/L) 211  
 
  Fluid Amylase (U/L) < 30  
 
Urines   
 
  Urine Color (YEL,AMB,STR)  COREY 
 
  Urine Clarity (CLEAR)  HAZY  H 
 
  Urine pH (5.0 - 8.0)  6.0 
 
  Ur Specific Gravity (1.001 - 1.035)  1.015 
 
  Urine Protein (NEG,<30 MG/DL)  30  H 
 
  Urine Ketones (NEG)  NEG 
 
  Urine Nitrite (NEG)  POS  H 
 
  Urine Bilirubin (NEG)  NEG 
 
  Urine Urobilinogen (0.1  -  1.0 EU/dl)  0.2 
 
  Ur Leukocyte Esterase (NEG)  MOD  H 
 
  Ur Microscopic  SEDIMENT EXAMINED 
 
  Urine WBC (0 - 2 /HPF)  25-50  H 
 
  Ur Epithelial Cells (NONE,FEW)  FEW 
 
  Urine Bacteria (NEG/NONE)  MANY  H 
 
  Urine Hemoglobin (NEG)  NEG 
 
  Ur Random Creatinine (mg/dL)   75.3
 
  Ur Random Sodium (30 - 90 mmol/L)   25  L
 
  Ur Random Potassium (mmol/L)   19.0
 
  Fraction Sodium Excret (<1% %)   0.6
 
  Urine Glucose (N MG/DL)  NEG 
 
 
 
 
 03/07 03/07 03/07
 
 0745 0709 0215
 
Chemistry   
 
  Sodium (137 - 145 mmol/L) 133  L  
 
  Potassium (3.5 - 5.1 mmol/L) 3.9  
 
  Chloride (98 - 107 mmol/L) 104  
 
  Carbon Dioxide (22 - 30 mmol/L) 17  L  
 
  Anion Gap (5 - 16) 12  
 
  BUN (7 - 17 mg/dL) 64  H  
 
  Creatinine (0.5 - 1.0 mg/dL) 2.3  H  
 
  Estimated GFR (>60 ml/min) 21  L  
 
  BUN/Creatinine Ratio (7 - 25 %) 27.8  H  
 
  Total Bilirubin (0.2 - 1.3 mg/dL) 0.4  
 
  Direct Bilirubin (< 0.4 mg/dL) 0.4  
 
  AST (14 - 36 U/L) 19  
 
  ALT (9 - 52 U/L) 17  
 
  Alkaline Phosphatase (<127 U/L) 192  H  
 
  Troponin I (< 0.11 ng/ml) < 0.01 Cancelled Cancelled
 
  Total Protein (6.3 - 8.2 g/dL) 4.4  L  
 
  Albumin (3.5 - 5.0 g/dL) 2.2  L  
 
Hematology   
 
  CBC w Diff NO MAN DIFF REQ  
 
  WBC (4.8 - 10.8 /CUMM) 8.9  
 
  RBC (4.20 - 5.40 /CUMM) 2.72  L  
 
  Hgb (12.0 - 16.0 G/DL) 8.2  L  
 
  Hct (37 - 47 %) 24.3  L  
 
  MCV (81.0 - 99.0 FL) 89.3  
 
  MCH (27.0 - 31.0 PG) 30.1  
 
  MCHC (33.0 - 37.0 G/DL) 33.7  
 
  RDW (11.5 - 14.5 %) 14.6  H  
 
  Plt Count (130 - 400 /CUMM) 259  
 
  MPV (7.4 - 10.4 FL) 7.0  L  
 
  Gran % (42.2 - 75.2 %) 85.7  H  
 
  Lymphocytes % (20.5 - 51.1 %) 6.8  L  
 
  Monocytes % (1.7 - 9.3 %) 7.3  
 
  Eosinophils % (0 - 5 %) 0.1  
 
  Basophils % (0.0 - 2.0 %) 0.1  
 
  Absolute Granulocytes (1.4 - 6.5 /CUMM) 7.6  H  
 
  Absolute Lymphocytes (1.2 - 3.4 /CUMM) 0.6  L  
 
  Absolute Monocytes (0.10 - 0.60 /CUMM) 0.6  
 
  Absolute Eosinophils (0.0 - 0.7 /CUMM) 0  
 
  Absolute Basophils (0.0 - 0.2 /CUMM) 0  
 
 
 
 
 03/06 03/06
 
 2240 1915
 
Chemistry  
 
  Sodium (137 - 145 mmol/L)  132  L
 
  Potassium (3.5 - 5.1 mmol/L)  4.4
 
  Chloride (98 - 107 mmol/L)  101
 
  Carbon Dioxide (22 - 30 mmol/L)  19  L
 
  Anion Gap (5 - 16)  11
 
  BUN (7 - 17 mg/dL)  66  H
 
  Creatinine (0.5 - 1.0 mg/dL)  2.2  H
 
  Estimated GFR (>60 ml/min)  22  L
 
  BUN/Creatinine Ratio (7 - 25 %)  30.0  H
 
  Glucose (65 - 99 mg/dL)  76
 
  Calcium (8.4 - 10.2 mg/dL)  9.3
 
  Total Bilirubin (0.2 - 1.3 mg/dL)  0.6
 
  AST (14 - 36 U/L)  24
 
  ALT (9 - 52 U/L)  27
 
  Alkaline Phosphatase (<127 U/L)  230  H
 
  Troponin I (< 0.11 ng/ml) 0.01 0.02
 
  Total Protein (6.3 - 8.2 g/dL)  5.0  L
 
  Albumin (3.5 - 5.0 g/dL)  2.4  L
 
  Globulin (1.9 - 4.2 gm/dL)  2.6
 
  Albumin/Globulin Ratio (1.1 - 2.2 %)  0.9  L
 
Hematology  
 
  CBC w Diff  NO MAN DIFF REQ
 
  WBC (4.8 - 10.8 /CUMM)  8.7
 
  RBC (4.20 - 5.40 /CUMM)  3.40  L
 
  Hgb (12.0 - 16.0 G/DL)  10.2  L
 
  Hct (37 - 47 %)  30.7  L
 
  MCV (81.0 - 99.0 FL)  90.4
 
  MCH (27.0 - 31.0 PG)  30.1
 
  MCHC (33.0 - 37.0 G/DL)  33.3
 
  RDW (11.5 - 14.5 %)  14.7  H
 
  Plt Count (130 - 400 /CUMM)  312
 
  MPV (7.4 - 10.4 FL)  6.9  L
 
  Gran % (42.2 - 75.2 %)  88.6  H
 
  Lymphocytes % (20.5 - 51.1 %)  6.7  L
 
  Monocytes % (1.7 - 9.3 %)  4.7
 
  Eosinophils % (0 - 5 %)  0
 
  Basophils % (0.0 - 2.0 %)  0
 
  Absolute Granulocytes (1.4 - 6.5 /CUMM)  7.7  H
 
  Absolute Lymphocytes (1.2 - 3.4 /CUMM)  0.6  L
 
  Absolute Monocytes (0.10 - 0.60 /CUMM)  0.4
 
  Absolute Eosinophils (0.0 - 0.7 /CUMM)  0
 
  Absolute Basophils (0.0 - 0.2 /CUMM)  0
 
 
 
Imaging/Other Studies:
EXAM TYPE: RAD - XRY-PORTABLE CHEST XRAY
 
EXAMINATION:
XR PORTABLE CHEST
 
CLINICAL INFORMATION:
Diminished breath sounds
 
COMPARISON:
11/16/2016
 
TECHNIQUE:
Portable frontal view of the chest was obtained.
 
FINDINGS:
Slight apical lordotic positioning of the patient. Lungs are well expanded
and without interstitial edema, focal consolidation or pleural effusion.
Cardiac silhouette is normal in size. Surgical changes from remote coronary
artery bypass grafting with intact sternotomy wires in place. No acute
osseous findings. Chronic osteoarthritis of glenohumeral joints.
 
IMPRESSION:
Surgical changes from prior CABG. No acute cardiopulmonary findings compared
11/16/2016.

## 2018-03-08 NOTE — PN- HOUSESTAFF
Pretty PINTO,Theodore 18 0723:
Subjective
Follow-up For:
Liver lesions, suspected malignancy
RAHUL, likely secondary to prerenal azotemia
Subjective:
Patient seen and examined at bedside.  She was resting comfortably.  She 
continues to complain of nausea and pain of her sacral wound. She rates the pain
at a 5/10.  She has no other complaints and denies any shortness of breath, 
chest pain, vomiting, fever, chills.
 
Review of Systems
Constitutional:
Denies: chills, fever. 
Cardiovascular:
Denies: chest pain, palpitations. 
Respiratory:
Denies: cough, short of breath. 
Gastrointestinal:
Reports: bowel incontinence (chronic), nausea.  Denies: vomiting. 
Genitourinary:
Reports: no symptoms. 
Musculoskeletal:
Reports: see HPI. 
 
Objective
Last 24 Hrs of Vital Signs/I&O
 Vital Signs
 
 
Date Time Temp Pulse Resp B/P B/P Pulse O2 O2 Flow FiO2
 
     Mean Ox Delivery Rate 
 
 0645 98.9 67 20 120/60  91 Room Air  
 
 2209 98.1 62 18 157/75  97 Room Air  
 
 2156  62  157/75     
 
 2156  62  157/75     
 
 1435 97.9 65 18 127/68  97 Room Air  
 
 0745 98.1 65 20 126/60  96 Room Air  
 
 
 Intake & Output
 
 
  0800  0000  1600
 
Intake Total 360 350 240
 
Output Total   100
 
Balance 360 350 140
 
    
 
Intake, IV  110 
 
Intake, Oral 360 240 240
 
Output, Urine   100
 
Patient 118 lb  
 
Weight   
 
Weight Bed scale  
 
Measurement   
 
Method   
 
 
 
 
Physical Exam
General Appearance: Alert, Oriented X3, Cooperative, No Acute Distress
Skin Temp/Moisture Exam: Warm/Dry
Sepsis Skin Exam (color): Jaundiced
Cardiovascular: Regular Rate, Normal S1, Normal S2, systolic murmur
Lungs: Clear to Auscultation, Normal Air Movement
Abdomen: distended with +fluid bruit, no TTP
Neurological: Normal Speech
Extremities: 1+ pitting edema of the distal LEs bilaterally
Current Medications:
 Current Medications
 
 
  Sig/Jose E Start time  Last
 
Medication Dose Route Stop Time Status Admin
 
Albumin Human 12.5 GM TID  1000 AC 
 
  IV   2153
 
Albuterol Sulfate 3 ML Q4H PRN  0200 AC 
 
  INH   
 
Amlodipine Besylate 10 MG DAILY  1000 AC 
 
  PO   1535
 
Cholecalciferol 1,000 IU BID  1000 AC 
 
  PO   2156
 
Clonidine 0.3 MG Q12  1000 AC 
 
  PO   2156
 
Guaifenesin 10 ML Q6P PRN  0200 AC 
 
  PO   
 
Heparin Sodium  5,000 UNIT Q8  2200 AC 
 
(Porcine)  SC   2156
 
Hydralazine HCl 75 MG TID  1000 AC 
 
  PO   2156
 
Isosorbide  60 MG DAILY  1000 AC 
 
Mononitrate  PO   1532
 
Lidocaine 1 ML .STK-MED ONE  1300 DC 
 
  ID  1301  
 
Metoprolol Tartrate 50 MG DAILY  1000 AC 
 
  PO   1532
 
Omeprazole 40 MG DAILY AC  0700 AC 
 
  PO   0558
 
Ondansetron HCl 0 .STK-MED ONE  1257 DC 
 
  .ROUTE   
 
Ondansetron HCl 4 MG TID PRN  0200 AC 
 
  PO   0249
 
Oxycodone HCl 5 MG Q8P PRN  0200 AC 
 
  PO   0124
 
Polyethylene Glycol 17 GM BID  1000 AC 
 
  PO   2155
 
Potassium Chloride 20 MEQ   1345 CAN 
 
  IV   
 
Tramadol HCl 50 MG DAILY  1000 AC 
 
  PO   
 
 
 
 
Last 24 Hrs of Lab/Lio Results
Last 24 Hrs of Labs/Mics:
 Laboratory Tests
 
18:
PT Cancelled, INR Cancelled
 
18 1312:
Fluid WBC 67  H, Fld Mesothelial Cells 57, Fld Total RBCs Counted 100  H
 
18 1312:
Lymphocytes 12, % Normal PMNs 31, Fluid Glucose 68, Fluid Total Protein < 2.0, 
Fluid Albumin < 1.0, Fluid , Fluid Amylase < 30
 
18 1100:
Urine Color COERY, Urine Clarity HAZY  H, Urine pH 6.0, Ur Specific Gravity 
1.015, Urine Protein 30  H, Urine Ketones NEG, Urine Nitrite POS  H, Urine 
Bilirubin NEG, Urine Urobilinogen 0.2, Ur Leukocyte Esterase MOD  H, Ur 
Microscopic SEDIMENT EXAMINED, Urine WBC 25-50  H, Ur Epithelial Cells FEW, 
Urine Bacteria MANY  H, Urine Hemoglobin NEG, Urine Glucose NEG
 
18 1100:
Ur Random Creatinine 75.3, Ur Random Sodium 25  L, Ur Random Potassium 19.0, 
Fraction Sodium Excret 0.6
 
18 0745:
Anion Gap 12, Estimated GFR 21  L, BUN/Creatinine Ratio 27.8  H, Total Bilirubin
0.4, Direct Bilirubin 0.4, AST 19, ALT 17, Alkaline Phosphatase 192  H, Troponin
I < 0.01, Total Protein 4.4  L, Albumin 2.2  L, CBC w Diff NO MAN DIFF REQ, RBC 
2.72  L, MCV 89.3, MCH 30.1, MCHC 33.7, RDW 14.6  H, MPV 7.0  L, Gran % 85.7  H,
Lymphocytes % 6.8  L, Monocytes % 7.3, Eosinophils % 0.1, Basophils % 0.1, 
Absolute Granulocytes 7.6  H, Absolute Lymphocytes 0.6  L, Absolute Monocytes 
0.6, Absolute Eosinophils 0, Absolute Basophils 0
 Microbiology
 1625  URINE ROUT: Urine Culture - COLB
 131  BODY FLUID: Body Fluid Culture - RECD
 1312  BODY FLUID: Gram Stain - RECD
 
 
 
Assessment/Plan
Assessment:
Patient is a 77-year-old female with a PMH significant for CKD stage IV, CAD 
status post CABG, PVD status post angioplasty with stent, HTN, uterine and colon
cancer, liver lesions either primary or metastatic cancer, who presented to 
[facility] from rehabilitation due to worsening  poor by mouth intake, 
nausea and vomiting, worsening ascites and worsening renal function.  
 
#RAHUL on CKD 
Likely prerenal azotemia
-Continue IV albumin infusions
-Continue to monitor BEP
-follow-up urine culture
-Follow-up postvoid bladder scan, patient has been incontinent so this may be 
difficult
 
#Worsening ascites
Patient has been following with Dr. Jolly for hepatic masses, metastatic or 
primary lesions.   She has elevated CEA but no definitive diagnosis has been 
made. She is currently asymptomatic with no abdominal pain, no shortness of 
breath.  Patient went for diagnostic paracentesis to rule out SBP. 
-Acetic fluid consistent with transudate
-Follow-up ascitic fluid cultures
-Continue albumin infusions, increased to 25 g 3 times a day
-Patient to go for IR liver mass biopsy and therapeutic paracentesis tomorrow in
the morning
 
#chronic pressure ulcer
Wound care consult was placed.  Recommendations appreciated
-Treat with DuoDERM and offloading
 
#Protein calorie malnutrition
Nutrition consult was placed.
-Will follow up nutrition recommendations, for now patient is on sodium 
restricted diet
 
Diet: Heart healthy
DVT prophylaxis:SC heparin, ALPS
Code status: full code
Problem List:
 1. Acute on chronic kidney failure
 
 2. Mass of multiple sites of liver
 
Pain Ratin
Pain Location:
sacral wound
Pain Goal: Pain 4 or less
Pain Plan:
pain pathway
Tomorrow's Labs & Rationales:
cbc, bep
 
 
James Camp MD 18 1353:
Attending MD Review Statement
 
Attending Statement
Attending MD Statement: examined this patient, discuss w/resident/PA/NP, agreed 
w/resident/PA/NP, reviewed EMR data (avail)
Attending Assessment/Plan:
Nausea and vomiting have improved.  Will continue with albumin, NPO after 
midnight for liver biopsy tomorrow and paracentesis, continue current management
, DVT PPx.  PT eval for anticipated discharge over the weekend back to facility.

## 2018-03-08 NOTE — PN- GASTROENTEROLOGY
Assessment/Plan GI
Assessment/Recommendations:
Assessment: Ms. Perez is a 77 year old female admitted with worsening ascites
and renal failure and liver lesions on ct scan suggestive of metastatic disease.
 She underwent a diagnostic paracentesis yesterday which was negative for sbp by
cell count and her SAAG was greater then 1.1 indicating the ascitic fluid is 
from portal hypertension and not necessarily from a malignancy.  Her kidney 
function has remained unchanged in spite of albumin.  She has been scheduled for
a liver biopsy and large volume paracentesis for tomorrow.
 
Recommendations:
1.  Continue albumin and considering the lack of improvement would increase to 
25 gm tid
2.  Follow daily renal function and lytes.
3.  Agree with plan for liver biopsy and large volume paracentesis for tomorrow.
 
I will continue to follow this patient and  make further recommendations based 
on her clinical course and the results of the liver biopsy and paracentesis.
 
 
Subjective
Subjective:
pt s/p diagnostic paracentesis yesterday.  no other complaints today and no 
significant nausea, vomiting or abdominal pain.
 
Objective
Vital Signs and I&Os
Vital Signs
 
 
Date Time Temp Pulse Resp B/P B/P Pulse O2 O2 Flow FiO2
 
     Mean Ox Delivery Rate 
 
03/08 0921 98.9 67 20 120/60     
 
03/08 0920 98.9 67 20 120/60     
 
03/08 0919 98.9 67 20 120/60     
 
03/08 0919 98.9 67 20 120/60     
 
03/08 0919 98.9 67 20 120/60     
 
03/08 0645 98.9 67 20 120/60  91 Room Air  
 
03/07 2209 98.1 62 18 157/75  97 Room Air  
 
03/07 2156  62  157/75     
 
03/07 2156  62  157/75     
 
03/07 1435 97.9 65 18 127/68  97 Room Air  
 
 
 Intake & Output
 
 
 03/08 1600 03/08 0400 03/07 1600 03/07 0400 03/06 1600 03/06 0400
 
Intake Total 360 350 720 50  
 
Output Total   100   
 
Balance 360 350 620 50  
 
       
 
Intake, IV  110 120   
 
Intake, Oral 360 240 600 50  
 
Output, Urine   100   
 
Patient 118 lb  119 lb 118 lb  
 
Weight      
 
Weight Bed scale  Bed scale Bed scale  
 
Measurement      
 
Method      
 
 
 
 
Physical Exam
General Appearance: no apparent distress, comfortable, lethargic
Head: atraumatic
Ears, Nose, Throat: normal pharynx
Neck: supple
Respiratory: decreased breath sounds
Cardiovascular: regular rate/rhythm
Abdomen: normal bowel sounds, soft, distention
Skin: intact, normal color
Current Medications:
 Current Medications
 
 
  Sig/Jose E Start time  Last
 
Medication Dose Route Stop Time Status Admin
 
Albumin Human 12.5 GM TID 03/07 1000 DC 03/08
 
  IV   0919
 
Albuterol Sulfate 3 ML Q4H PRN 03/07 0200 AC 
 
  INH   
 
Amlodipine Besylate 10 MG DAILY 03/07 1000 AC 03/08
 
  PO   0919
 
Cholecalciferol 1,000 IU BID 03/07 1000 AC 03/08
 
  PO   0919
 
Clonidine 0.3 MG Q12 03/07 1000 AC 03/07
 
  PO   2156
 
Guaifenesin 10 ML Q6P PRN 03/07 0200 AC 
 
  PO   
 
Heparin Sodium  5,000 UNIT Q8 03/07 2200 AC 03/07
 
(Porcine)  SC   2156
 
Hydralazine HCl 75 MG TID 03/07 1000 AC 03/07
 
  PO   2156
 
Isosorbide  60 MG DAILY 03/07 1000 AC 03/08
 
Mononitrate  PO   0919
 
Lactated Ringer's 1,000 ML Q10H 03/08 1415 UNVr 
 
  IV   
 
Metoprolol Tartrate 50 MG DAILY 03/07 1000 AC 03/08
 
  PO   0919
 
Omeprazole 40 MG DAILY AC 03/07 0700 AC 03/08
 
  PO   0558
 
Ondansetron HCl 4 MG TID PRN 03/07 0200 AC 03/07
 
  PO   0249
 
Oxycodone HCl 5 MG Q8P PRN 03/07 0200 AC 03/08
 
  PO   1231
 
Polyethylene Glycol 17 GM BID 03/07 1000 AC 03/07
 
  PO   2155
 
Tramadol HCl 50 MG DAILY 03/07 1000 AC 03/08
 
  PO   0918
 
 
 
 
Results
Pertinent Lab Results:
 Laboratory Tests
 
 
 03/08 03/07 03/07
 
 0810 1959 1312
 
Chemistry   
 
  Sodium (137 - 145 mmol/L) 136  L  
 
  Potassium (3.5 - 5.1 mmol/L) 4.0  
 
  Chloride (98 - 107 mmol/L) 104  
 
  Carbon Dioxide (22 - 30 mmol/L) 20  L  
 
  Anion Gap (5 - 16) 12  
 
  BUN (7 - 17 mg/dL) 61  H  
 
  Creatinine (0.5 - 1.0 mg/dL) 2.2  H  
 
  Estimated GFR (>60 ml/min) 22  L  
 
  BUN/Creatinine Ratio (7 - 25 %) 27.7  H  
 
Coagulation   
 
  PT (9.4 - 12.5 SEC) 13.0  H Cancelled 
 
  INR (0.90 - 1.19) 1.19 Cancelled 
 
Hematology   
 
  CBC w Diff MAN DIFF ORDERED  
 
  WBC (4.8 - 10.8 /CUMM) 8.9  
 
  RBC (4.20 - 5.40 /CUMM) 2.69  L  
 
  Hgb (12.0 - 16.0 G/DL) 8.1  L  
 
  Hct (37 - 47 %) 24.1  L  
 
  MCV (81.0 - 99.0 FL) 89.5  
 
  MCH (27.0 - 31.0 PG) 30.1  
 
  MCHC (33.0 - 37.0 G/DL) 33.6  
 
  RDW (11.5 - 14.5 %) 14.4  
 
  Plt Count (130 - 400 /CUMM) 228  
 
  MPV (7.4 - 10.4 FL) 6.9  L  
 
  Gran % (42.2 - 75.2 %) 87.1  H  
 
  Lymphocytes % (20.5 - 51.1 %) 6.7  L  
 
  Monocytes % (1.7 - 9.3 %) 6.0  
 
  Eosinophils % (0 - 5 %) 0  
 
  Basophils % (0.0 - 2.0 %) 0.2  
 
  Absolute Granulocytes (1.4 - 6.5 /CUMM) 7.8  H  
 
  Absolute Lymphocytes (1.2 - 3.4 /CUMM) 0.6  L  
 
  Absolute Monocytes (0.10 - 0.60 /CUMM) 0.5  
 
  Absolute Eosinophils (0.0 - 0.7 /CUMM) 0  
 
  Absolute Basophils (0.0 - 0.2 /CUMM) 0  
 
  Poikilocytosis 2+  
 
  Anisocytosis 2+  
 
  Ovalocytes 2+  
 
  [place] Cells 2+  
 
Other Body Source   
 
  Fluid WBC (0 - 5 /CUMM)   67  H
 
  Fld Mesothelial Cells (%)   57
 
  Fld Total RBCs Counted (0 /CUMM)   100  H
 
 
 
 
 03/07 03/07 03/07
 
 1312 1100 1100
 
Hematology   
 
  Lymphocytes (%) 12  
 
  % Normal PMNs (%) 31  
 
Other Body Source   
 
  Fluid Glucose (mg/dL) 68  
 
  Fluid Total Protein (g/dL) < 2.0  
 
  Fluid Albumin (g/dL) < 1.0  
 
  Fluid LDH (U/L) 211  
 
  Fluid Amylase (U/L) < 30  
 
Urines   
 
  Urine Color (YEL,AMB,STR)  COREY 
 
  Urine Clarity (CLEAR)  HAZY  H 
 
  Urine pH (5.0 - 8.0)  6.0 
 
  Ur Specific Gravity (1.001 - 1.035)  1.015 
 
  Urine Protein (NEG,<30 MG/DL)  30  H 
 
  Urine Ketones (NEG)  NEG 
 
  Urine Nitrite (NEG)  POS  H 
 
  Urine Bilirubin (NEG)  NEG 
 
  Urine Urobilinogen (0.1  -  1.0 EU/dl)  0.2 
 
  Ur Leukocyte Esterase (NEG)  MOD  H 
 
  Ur Microscopic  SEDIMENT EXAMINED 
 
  Urine WBC (0 - 2 /HPF)  25-50  H 
 
  Ur Epithelial Cells (NONE,FEW)  FEW 
 
  Urine Bacteria (NEG/NONE)  MANY  H 
 
  Urine Hemoglobin (NEG)  NEG 
 
  Ur Random Creatinine (mg/dL)   75.3
 
  Ur Random Sodium (30 - 90 mmol/L)   25  L
 
  Ur Random Potassium (mmol/L)   19.0
 
  Fraction Sodium Excret (<1% %)   0.6
 
  Urine Glucose (N MG/DL)  NEG 
 
 
 
 
 03/07 03/07 03/07
 
 0745 0709 0215
 
Chemistry   
 
  Sodium (137 - 145 mmol/L) 133  L  
 
  Potassium (3.5 - 5.1 mmol/L) 3.9  
 
  Chloride (98 - 107 mmol/L) 104  
 
  Carbon Dioxide (22 - 30 mmol/L) 17  L  
 
  Anion Gap (5 - 16) 12  
 
  BUN (7 - 17 mg/dL) 64  H  
 
  Creatinine (0.5 - 1.0 mg/dL) 2.3  H  
 
  Estimated GFR (>60 ml/min) 21  L  
 
  BUN/Creatinine Ratio (7 - 25 %) 27.8  H  
 
  Total Bilirubin (0.2 - 1.3 mg/dL) 0.4  
 
  Direct Bilirubin (< 0.4 mg/dL) 0.4  
 
  AST (14 - 36 U/L) 19  
 
  ALT (9 - 52 U/L) 17  
 
  Alkaline Phosphatase (<127 U/L) 192  H  
 
  Troponin I (< 0.11 ng/ml) < 0.01 Cancelled Cancelled
 
  Total Protein (6.3 - 8.2 g/dL) 4.4  L  
 
  Albumin (3.5 - 5.0 g/dL) 2.2  L  
 
Hematology   
 
  CBC w Diff NO MAN DIFF REQ  
 
  WBC (4.8 - 10.8 /CUMM) 8.9  
 
  RBC (4.20 - 5.40 /CUMM) 2.72  L  
 
  Hgb (12.0 - 16.0 G/DL) 8.2  L  
 
  Hct (37 - 47 %) 24.3  L  
 
  MCV (81.0 - 99.0 FL) 89.3  
 
  MCH (27.0 - 31.0 PG) 30.1  
 
  MCHC (33.0 - 37.0 G/DL) 33.7  
 
  RDW (11.5 - 14.5 %) 14.6  H  
 
  Plt Count (130 - 400 /CUMM) 259  
 
  MPV (7.4 - 10.4 FL) 7.0  L  
 
  Gran % (42.2 - 75.2 %) 85.7  H  
 
  Lymphocytes % (20.5 - 51.1 %) 6.8  L  
 
  Monocytes % (1.7 - 9.3 %) 7.3  
 
  Eosinophils % (0 - 5 %) 0.1  
 
  Basophils % (0.0 - 2.0 %) 0.1  
 
  Absolute Granulocytes (1.4 - 6.5 /CUMM) 7.6  H  
 
  Absolute Lymphocytes (1.2 - 3.4 /CUMM) 0.6  L  
 
  Absolute Monocytes (0.10 - 0.60 /CUMM) 0.6  
 
  Absolute Eosinophils (0.0 - 0.7 /CUMM) 0  
 
  Absolute Basophils (0.0 - 0.2 /CUMM) 0  
 
 
 
 
 03/06 03/06
 
 2240 1915
 
Chemistry  
 
  Sodium (137 - 145 mmol/L)  132  L
 
  Potassium (3.5 - 5.1 mmol/L)  4.4
 
  Chloride (98 - 107 mmol/L)  101
 
  Carbon Dioxide (22 - 30 mmol/L)  19  L
 
  Anion Gap (5 - 16)  11
 
  BUN (7 - 17 mg/dL)  66  H
 
  Creatinine (0.5 - 1.0 mg/dL)  2.2  H
 
  Estimated GFR (>60 ml/min)  22  L
 
  BUN/Creatinine Ratio (7 - 25 %)  30.0  H
 
  Glucose (65 - 99 mg/dL)  76
 
  Calcium (8.4 - 10.2 mg/dL)  9.3
 
  Total Bilirubin (0.2 - 1.3 mg/dL)  0.6
 
  AST (14 - 36 U/L)  24
 
  ALT (9 - 52 U/L)  27
 
  Alkaline Phosphatase (<127 U/L)  230  H
 
  Troponin I (< 0.11 ng/ml) 0.01 0.02
 
  Total Protein (6.3 - 8.2 g/dL)  5.0  L
 
  Albumin (3.5 - 5.0 g/dL)  2.4  L
 
  Globulin (1.9 - 4.2 gm/dL)  2.6
 
  Albumin/Globulin Ratio (1.1 - 2.2 %)  0.9  L
 
Hematology  
 
  CBC w Diff  NO MAN DIFF REQ
 
  WBC (4.8 - 10.8 /CUMM)  8.7
 
  RBC (4.20 - 5.40 /CUMM)  3.40  L
 
  Hgb (12.0 - 16.0 G/DL)  10.2  L
 
  Hct (37 - 47 %)  30.7  L
 
  MCV (81.0 - 99.0 FL)  90.4
 
  MCH (27.0 - 31.0 PG)  30.1
 
  MCHC (33.0 - 37.0 G/DL)  33.3
 
  RDW (11.5 - 14.5 %)  14.7  H
 
  Plt Count (130 - 400 /CUMM)  312
 
  MPV (7.4 - 10.4 FL)  6.9  L
 
  Gran % (42.2 - 75.2 %)  88.6  H
 
  Lymphocytes % (20.5 - 51.1 %)  6.7  L
 
  Monocytes % (1.7 - 9.3 %)  4.7
 
  Eosinophils % (0 - 5 %)  0
 
  Basophils % (0.0 - 2.0 %)  0
 
  Absolute Granulocytes (1.4 - 6.5 /CUMM)  7.7  H
 
  Absolute Lymphocytes (1.2 - 3.4 /CUMM)  0.6  L
 
  Absolute Monocytes (0.10 - 0.60 /CUMM)  0.4
 
  Absolute Eosinophils (0.0 - 0.7 /CUMM)  0
 
  Absolute Basophils (0.0 - 0.2 /CUMM)  0

## 2018-03-09 VITALS — DIASTOLIC BLOOD PRESSURE: 70 MMHG | SYSTOLIC BLOOD PRESSURE: 120 MMHG

## 2018-03-09 VITALS — DIASTOLIC BLOOD PRESSURE: 78 MMHG | SYSTOLIC BLOOD PRESSURE: 169 MMHG

## 2018-03-09 VITALS — SYSTOLIC BLOOD PRESSURE: 134 MMHG | DIASTOLIC BLOOD PRESSURE: 62 MMHG

## 2018-03-09 LAB
ABSOLUTE GRANULOCYTE CT: 9.7 /CUMM (ref 1.4–6.5)
BASOPHILS # BLD: 0 /CUMM (ref 0–0.2)
BASOPHILS NFR BLD: 0 % (ref 0–2)
EOSINOPHIL # BLD: 0 /CUMM (ref 0–0.7)
EOSINOPHIL NFR BLD: 0.1 % (ref 0–5)
ERYTHROCYTE [DISTWIDTH] IN BLOOD BY AUTOMATED COUNT: 15.1 % (ref 11.5–14.5)
GRANULOCYTES NFR BLD: 87.8 % (ref 42.2–75.2)
HCT VFR BLD CALC: 25.6 % (ref 37–47)
LYMPHOCYTES # BLD: 0.8 /CUMM (ref 1.2–3.4)
MCH RBC QN AUTO: 29.7 PG (ref 27–31)
MCHC RBC AUTO-ENTMCNC: 33 G/DL (ref 33–37)
MCV RBC AUTO: 89.9 FL (ref 81–99)
MONOCYTES # BLD: 0.6 /CUMM (ref 0.1–0.6)
PLATELET # BLD: 212 /CUMM (ref 130–400)
PMV BLD AUTO: 7.1 FL (ref 7.4–10.4)
RED BLOOD CELL CT: 2.85 /CUMM (ref 4.2–5.4)
WBC # BLD AUTO: 11.1 /CUMM (ref 4.8–10.8)

## 2018-03-09 NOTE — PN- NEPHROLOGY
Assessment/Plan Nephrology
Assessment:
CKD - Stage III CKD with minimal proteinuria thought to be 2/2 hypertensive 
nephrosclerosis.  Her SCr had been better recently than in the past likely 
reflective of decreased body mass.
 
RAHUL - Appears to be 2/2 pre-renal azotemia perhaps with some superimposed 
ischemic ATN from volume depletion.  Improved - would cont IVF until taking in 
good PO.
 
Liver mass with ascites - SAAG c/w portal hypertension.  Diagnosis not yet 
complete pending biopsy.  GI following.
Suggestion:
-Cont LR until taking in PO - may be able to decrease the rate
-f/u UCx
-Diagnostic evaluation of liver masses as per GI
-Bladder scan can be done even if incontinent - just confirm no large resdiual
-OK to do LVP if needed on Monday - would supplement with IV albumin at that 
time
 
Will see PRN
 
Please call  with ?'s
 
 
Subjective
Subjective:
SCr 1.5
On IVF - says eating more
Tired
Awaiting biopsy
Gram stain neg from diagnostic para
Incontinent - unable to get good PVR
UCx pending
 
Objective
Vital Signs and I&Os
Vital Signs
 
 
Date Time Temp Pulse Resp B/P B/P Pulse O2 O2 Flow FiO2
 
     Mean Ox Delivery Rate 
 
03/09 1053 98.8 80 20 134/62     
 
03/09 0651 98.8 80 20 134/62  92 Room Air  
 
03/08 2134 97.6 75 18 127/64  92   
 
03/08 2058  63  127/64     
 
03/08 2054  63  127/64     
 
03/08 1807  63  138/58     
 
03/08 1452 98.1 63 20 139/68  95   
 
 
 Intake & Output
 
 
 03/09 1600 03/09 0400 03/08 1600 03/08 0400 03/07 1600 03/07 0400
 
Intake Total  100 600 350 720 50
 
Output Total     100 
 
Balance  100 600 350 620 50
 
       
 
Intake, IV  100  110 120 
 
Intake, Oral   600 240 600 50
 
Number 1     
 
Bowel      
 
Movements      
 
Output, Urine     100 
 
Patient 119 lb  111 lb  119 lb 118 lb
 
Weight      
 
Weight   Bed scale  Bed scale Bed scale
 
Measurement      
 
Method      
 
 
 
Physical Exam:
Gen - Ok appearing
HEENT - supple
CV - RRR, no m/r/g
Chest - clear, no w/r/r
Abd - soft, NTND
Ext - no significant edema
Neuro - AOX3, grossly nonfocal
Current Medications:
 Current Medications
 
 
  Sig/Jose E Start time  Last
 
Medication Dose Route Stop Time Status Admin
 
Albumin Human 25 GM TID 03/08 2200 AC 03/09
 
  IV   1055
 
Albumin Human 12.5 GM TID 03/07 1000 DC 03/08
 
  IV   0919
 
Albuterol Sulfate 3 ML Q4H PRN 03/07 0200 AC 
 
  INH   
 
Amlodipine Besylate 10 MG DAILY 03/07 1000 AC 03/09
 
  PO   1053
 
Cholecalciferol 1,000 IU BID 03/07 1000 AC 03/09
 
  PO   1053
 
Clonidine 0.3 MG Q12 03/07 1000 AC 03/07
 
  PO   2156
 
Guaifenesin 10 ML Q6P PRN 03/07 0200 AC 
 
  PO   
 
Heparin Sodium  5,000 UNIT Q8 03/07 2200 AC 03/07
 
(Porcine)  SC   2156
 
Hydralazine HCl 75 MG TID 03/07 1000 AC 03/08
 
  PO   1807
 
Isosorbide  60 MG DAILY 03/07 1000 AC 03/08
 
Mononitrate  PO   0919
 
Lactated Ringer's 1,000 ML Q10H 03/08 1415 DC 
 
  IV   
 
Metoprolol Tartrate 50 MG DAILY 03/07 1000 AC 03/09
 
  PO   1053
 
Omeprazole 40 MG DAILY AC 03/07 0700 AC 03/08
 
  PO   0558
 
Ondansetron HCl 4 MG ONE ONE 03/09 0515 DC 03/09
 
  IV 03/09 0516  0503
 
Ondansetron HCl 0 .STK-MED ONE 03/09 0500 DC 
 
  .ROUTE 03/09 0501  
 
Ondansetron HCl 4 MG TID PRN 03/07 0200 DC 03/07
 
  PO   0249
 
Oxycodone HCl 5 MG Q8P PRN 03/07 0200 AC 03/09
 
  PO   0005
 
Polyethylene Glycol 17 GM BID 03/07 1000 AC 03/09
 
  PO   1053
 
Potassium Chloride 40 MEQ Q2H 03/09 1000 DC 03/09
 
  PO 03/09 1201  1227
 
Promethazine HCl 25 MG Q4P PRN 03/09 0800 AC 
 
  IV 03/16 0759  
 
Sodium Chloride 1,000 ML Q6H 03/09 0930 AC 
 
  IV   
 
Tramadol HCl 50 MG DAILY 03/07 1000 AC 03/09
 
  PO   1100
 
Trimethobenzamide HCl 200 MG 4 TIMES/DAY 03/09 0755 DC 
 
  IM   
 
 
 
 
Results
Pertinent Lab Results:
 Laboratory Tests
 
 
 03/09 03/08
 
 0745 0810
 
Chemistry  
 
  Sodium (137 - 145 mmol/L) 138 136  L
 
  Potassium (3.5 - 5.1 mmol/L) 3.2  L 4.0
 
  Chloride (98 - 107 mmol/L) 110  H 104
 
  Carbon Dioxide (22 - 30 mmol/L) 19  L 20  L
 
  Anion Gap (5 - 16) 9 12
 
  BUN (7 - 17 mg/dL) 51  H 61  H
 
  Creatinine (0.5 - 1.0 mg/dL) 1.5  H 2.2  H
 
  Estimated GFR (>60 ml/min) 34  L 22  L
 
  BUN/Creatinine Ratio (7 - 25 %) 34.0  H 27.7  H
 
  Total Bilirubin (0.2 - 1.3 mg/dL) 0.5 
 
  Direct Bilirubin (< 0.4 mg/dL) 0.4 
 
  AST (14 - 36 U/L) 14 
 
  ALT (9 - 52 U/L) 20 
 
  Alkaline Phosphatase (<127 U/L) 180  H 
 
  Total Protein (6.3 - 8.2 g/dL) 4.1  L 
 
  Albumin (3.5 - 5.0 g/dL) 2.1  L 
 
Coagulation  
 
  PT (9.4 - 12.5 SEC)  13.0  H
 
  INR (0.90 - 1.19)  1.19
 
Hematology  
 
  CBC w Diff NO MAN DIFF REQ MAN DIFF ORDERED
 
  WBC (4.8 - 10.8 /CUMM) 11.1  H 8.9
 
  RBC (4.20 - 5.40 /CUMM) 2.85  L 2.69  L
 
  Hgb (12.0 - 16.0 G/DL) 8.5  L 8.1  L
 
  Hct (37 - 47 %) 25.6  L 24.1  L
 
  MCV (81.0 - 99.0 FL) 89.9 89.5
 
  MCH (27.0 - 31.0 PG) 29.7 30.1
 
  MCHC (33.0 - 37.0 G/DL) 33.0 33.6
 
  RDW (11.5 - 14.5 %) 15.1  H 14.4
 
  Plt Count (130 - 400 /CUMM) 212 228
 
  MPV (7.4 - 10.4 FL) 7.1  L 6.9  L
 
  Gran % (42.2 - 75.2 %) 87.8  H 87.1  H
 
  Lymphocytes % (20.5 - 51.1 %) 6.9  L 6.7  L
 
  Monocytes % (1.7 - 9.3 %) 5.2 6.0
 
  Eosinophils % (0 - 5 %) 0.1 0
 
  Basophils % (0.0 - 2.0 %) 0 0.2
 
  Absolute Granulocytes (1.4 - 6.5 /CUMM) 9.7  H 7.8  H
 
  Absolute Lymphocytes (1.2 - 3.4 /CUMM) 0.8  L 0.6  L
 
  Absolute Monocytes (0.10 - 0.60 /CUMM) 0.6 0.5
 
  Absolute Eosinophils (0.0 - 0.7 /CUMM) 0 0
 
  Absolute Basophils (0.0 - 0.2 /CUMM) 0 0
 
  Poikilocytosis  2+
 
  Anisocytosis  2+
 
  Ovalocytes  2+
 
  [place] Cells  2+
 
 
 
 
 03/07 03/07 03/07 1959 1312 1312
 
Coagulation   
 
  PT Cancelled  
 
  INR Cancelled  
 
Hematology   
 
  Lymphocytes (%)   12
 
  % Normal PMNs (%)   31
 
Other Body Source   
 
  Fluid WBC (0 - 5 /CUMM)  67  H 
 
  Fld Mesothelial Cells (%)  57 
 
  Fld Total RBCs Counted (0 /CUMM)  100  H 
 
  Fluid Glucose (mg/dL)   68
 
  Fluid Total Protein (g/dL)   < 2.0
 
  Fluid Albumin (g/dL)   < 1.0
 
  Fluid LDH (U/L)   211
 
  Fluid Amylase (U/L)   < 30
 
 
 
 
 03/07 03/07
 
 1100 1100
 
Urines  
 
  Urine Color (YEL,AMB,STR) COREY 
 
  Urine Clarity (CLEAR) HAZY  H 
 
  Urine pH (5.0 - 8.0) 6.0 
 
  Ur Specific Gravity (1.001 - 1.035) 1.015 
 
  Urine Protein (NEG,<30 MG/DL) 30  H 
 
  Urine Ketones (NEG) NEG 
 
  Urine Nitrite (NEG) POS  H 
 
  Urine Bilirubin (NEG) NEG 
 
  Urine Urobilinogen (0.1  -  1.0 EU/dl) 0.2 
 
  Ur Leukocyte Esterase (NEG) MOD  H 
 
  Ur Microscopic SEDIMENT EXAMINED 
 
  Urine WBC (0 - 2 /HPF) 25-50  H 
 
  Ur Epithelial Cells (NONE,FEW) FEW 
 
  Urine Bacteria (NEG/NONE) MANY  H 
 
  Urine Hemoglobin (NEG) NEG 
 
  Ur Random Creatinine (mg/dL)  75.3
 
  Ur Random Sodium (30 - 90 mmol/L)  25  L
 
  Ur Random Potassium (mmol/L)  19.0
 
  Fraction Sodium Excret (<1% %)  0.6
 
  Urine Glucose (N MG/DL) NEG 
 
 
 
 
 03/07 03/07 03/07
 
 0745 0709 0215
 
Chemistry   
 
  Sodium (137 - 145 mmol/L) 133  L  
 
  Potassium (3.5 - 5.1 mmol/L) 3.9  
 
  Chloride (98 - 107 mmol/L) 104  
 
  Carbon Dioxide (22 - 30 mmol/L) 17  L  
 
  Anion Gap (5 - 16) 12  
 
  BUN (7 - 17 mg/dL) 64  H  
 
  Creatinine (0.5 - 1.0 mg/dL) 2.3  H  
 
  Estimated GFR (>60 ml/min) 21  L  
 
  BUN/Creatinine Ratio (7 - 25 %) 27.8  H  
 
  Total Bilirubin (0.2 - 1.3 mg/dL) 0.4  
 
  Direct Bilirubin (< 0.4 mg/dL) 0.4  
 
  AST (14 - 36 U/L) 19  
 
  ALT (9 - 52 U/L) 17  
 
  Alkaline Phosphatase (<127 U/L) 192  H  
 
  Troponin I (< 0.11 ng/ml) < 0.01 Cancelled Cancelled
 
  Total Protein (6.3 - 8.2 g/dL) 4.4  L  
 
  Albumin (3.5 - 5.0 g/dL) 2.2  L  
 
Hematology   
 
  CBC w Diff NO MAN DIFF REQ  
 
  WBC (4.8 - 10.8 /CUMM) 8.9  
 
  RBC (4.20 - 5.40 /CUMM) 2.72  L  
 
  Hgb (12.0 - 16.0 G/DL) 8.2  L  
 
  Hct (37 - 47 %) 24.3  L  
 
  MCV (81.0 - 99.0 FL) 89.3  
 
  MCH (27.0 - 31.0 PG) 30.1  
 
  MCHC (33.0 - 37.0 G/DL) 33.7  
 
  RDW (11.5 - 14.5 %) 14.6  H  
 
  Plt Count (130 - 400 /CUMM) 259  
 
  MPV (7.4 - 10.4 FL) 7.0  L  
 
  Gran % (42.2 - 75.2 %) 85.7  H  
 
  Lymphocytes % (20.5 - 51.1 %) 6.8  L  
 
  Monocytes % (1.7 - 9.3 %) 7.3  
 
  Eosinophils % (0 - 5 %) 0.1  
 
  Basophils % (0.0 - 2.0 %) 0.1  
 
  Absolute Granulocytes (1.4 - 6.5 /CUMM) 7.6  H  
 
  Absolute Lymphocytes (1.2 - 3.4 /CUMM) 0.6  L  
 
  Absolute Monocytes (0.10 - 0.60 /CUMM) 0.6  
 
  Absolute Eosinophils (0.0 - 0.7 /CUMM) 0  
 
  Absolute Basophils (0.0 - 0.2 /CUMM) 0  
 
 
 
 
 03/06 03/06
 
 2240 1915
 
Chemistry  
 
  Sodium (137 - 145 mmol/L)  132  L
 
  Potassium (3.5 - 5.1 mmol/L)  4.4
 
  Chloride (98 - 107 mmol/L)  101
 
  Carbon Dioxide (22 - 30 mmol/L)  19  L
 
  Anion Gap (5 - 16)  11
 
  BUN (7 - 17 mg/dL)  66  H
 
  Creatinine (0.5 - 1.0 mg/dL)  2.2  H
 
  Estimated GFR (>60 ml/min)  22  L
 
  BUN/Creatinine Ratio (7 - 25 %)  30.0  H
 
  Glucose (65 - 99 mg/dL)  76
 
  Calcium (8.4 - 10.2 mg/dL)  9.3
 
  Total Bilirubin (0.2 - 1.3 mg/dL)  0.6
 
  AST (14 - 36 U/L)  24
 
  ALT (9 - 52 U/L)  27
 
  Alkaline Phosphatase (<127 U/L)  230  H
 
  Troponin I (< 0.11 ng/ml) 0.01 0.02
 
  Total Protein (6.3 - 8.2 g/dL)  5.0  L
 
  Albumin (3.5 - 5.0 g/dL)  2.4  L
 
  Globulin (1.9 - 4.2 gm/dL)  2.6
 
  Albumin/Globulin Ratio (1.1 - 2.2 %)  0.9  L
 
Hematology  
 
  CBC w Diff  NO MAN DIFF REQ
 
  WBC (4.8 - 10.8 /CUMM)  8.7
 
  RBC (4.20 - 5.40 /CUMM)  3.40  L
 
  Hgb (12.0 - 16.0 G/DL)  10.2  L
 
  Hct (37 - 47 %)  30.7  L
 
  MCV (81.0 - 99.0 FL)  90.4
 
  MCH (27.0 - 31.0 PG)  30.1
 
  MCHC (33.0 - 37.0 G/DL)  33.3
 
  RDW (11.5 - 14.5 %)  14.7  H
 
  Plt Count (130 - 400 /CUMM)  312
 
  MPV (7.4 - 10.4 FL)  6.9  L
 
  Gran % (42.2 - 75.2 %)  88.6  H
 
  Lymphocytes % (20.5 - 51.1 %)  6.7  L
 
  Monocytes % (1.7 - 9.3 %)  4.7
 
  Eosinophils % (0 - 5 %)  0
 
  Basophils % (0.0 - 2.0 %)  0
 
  Absolute Granulocytes (1.4 - 6.5 /CUMM)  7.7  H
 
  Absolute Lymphocytes (1.2 - 3.4 /CUMM)  0.6  L
 
  Absolute Monocytes (0.10 - 0.60 /CUMM)  0.4
 
  Absolute Eosinophils (0.0 - 0.7 /CUMM)  0
 
  Absolute Basophils (0.0 - 0.2 /CUMM)  0

## 2018-03-09 NOTE — PN- HOUSESTAFF
Pretty PINTO,Theodore 18 0658:
Subjective
Follow-up For:
RAHUL on CKD
Ascites
Subjective:
The patient was seen and examined at bedside.  She was in mild distress 
secondary to nausea.  She had no acute events overnight.  She reports 
improvement of the pain in her sacral ulcer.  She has no other complaints.  She 
denies any abdominal pain, vomiting, fever, chills.
 
Review of Systems
Constitutional:
Denies: chills, fever. 
EENTM:
Reports: no symptoms. 
Cardiovascular:
Reports: no symptoms. 
Respiratory:
Reports: no symptoms. 
Gastrointestinal:
Reports: bowel incontinence (chronic), nausea.  Denies: changes in stool, 
vomiting. 
Genitourinary:
Reports: no symptoms. 
Musculoskeletal:
Reports: no symptoms. 
 
Objective
Last 24 Hrs of Vital Signs/I&O
 Vital Signs
 
 
Date Time Temp Pulse Resp B/P B/P Pulse O2 O2 Flow FiO2
 
     Mean Ox Delivery Rate 
 
651 98.8 80 20 134/62  92 Room Air  
 
2134 97.6 75 18 127/64  92   
 
2058  63  127/64     
 
2054  63  127/64     
 
 1807  63  138/58     
 
 1452 98.1 63 20 139/68  95   
 
/08 0921 98.9 67 20 120/60     
 
/08 0920 98.9 67 20 120/60     
 
/08 0919 98.9 67 20 120/60     
 
/08 0919 98.9 67 20 120/60     
 
/08 0919 98.9 67 20 120/60     
 
 
 Intake & Output
 
 
  0800 / 0000  1600
 
Intake Total  100 240
 
Output Total   
 
Balance  100 240
 
    
 
Intake, IV  100 
 
Intake, Oral   240
 
Number 1  
 
Bowel   
 
Movements   
 
Patient 119 lb  111 lb
 
Weight   
 
 
 
 
Physical Exam
General Appearance: Alert, Oriented X3, Cooperative, No Acute Distress, 
cachectic
Skin Temp/Moisture Exam: Warm/Dry
Cardiovascular: Regular Rate, Normal S1, Normal S2
Lungs: diminished breath sounds
Abdomen: No Tenderness, distended, + fluid thrill
Neurological: Normal Speech, Normal Tone, Sensation Intact
Extremities: 1+ pitting edema
Current Medications:
 Current Medications
 
 
  Sig/Jose E Start time  Last
 
Medication Dose Route Stop Time Status Admin
 
Albumin Human 25 GM TID 2200 AC 
 
  IV   
 
Albumin Human 12.5 GM TID  1000 DC 
 
  IV   919
 
Albuterol Sulfate 3 ML Q4H PRN  0200 AC 
 
  INH   
 
Amlodipine Besylate 10 MG DAILY  1000 AC 
 
  PO   0919
 
Cholecalciferol 1,000 IU BID  1000 AC 
 
  PO   0919
 
Clonidine 0.3 MG Q12  1000 AC 
 
  PO   2156
 
Guaifenesin 10 ML Q6P PRN  0200 AC 
 
  PO   
 
Heparin Sodium  5,000 UNIT Q8  2200 AC 
 
(Porcine)  SC   2156
 
Hydralazine HCl 75 MG TID  1000 AC 
 
  PO   1807
 
Isosorbide  60 MG DAILY  1000 AC 
 
Mononitrate  PO   0919
 
Lactated Ringer's 1,000 ML Q10H  1415 DC 
 
  IV   
 
Metoprolol Tartrate 50 MG DAILY  1000 AC 
 
  PO   0919
 
Omeprazole 40 MG DAILY AC  0700 AC 
 
  PO   0558
 
Ondansetron HCl 4 MG ONE ONE  0515 DC 
 
  IV  0516  0503
 
Ondansetron HCl 0 .STK-MED ONE  0500 DC 
 
  .ROUTE  0501  
 
Ondansetron HCl 4 MG TID PRN  0200 AC 
 
  PO   0249
 
Oxycodone HCl 5 MG Q8P PRN  0200 AC 
 
  PO   0005
 
Polyethylene Glycol 17 GM BID  1000 AC 
 
  PO   2155
 
Tramadol HCl 50 MG DAILY  1000 AC 
 
  PO   0918
 
 
 
 
Last 24 Hrs of Lab/Lio Results
Last 24 Hrs of Labs/Mics:
 Laboratory Tests
 
18 0810:
Anion Gap 12, Estimated GFR 22  L, BUN/Creatinine Ratio 27.7  H, PT 13.0  H, INR
1.19, CBC w Diff MAN DIFF ORDERED, RBC 2.69  L, MCV 89.5, MCH 30.1, MCHC 33.6, 
RDW 14.4, MPV 6.9  L, Gran % 87.1  H, Lymphocytes % 6.7  L, Monocytes % 6.0, 
Eosinophils % 0, Basophils % 0.2, Absolute Granulocytes 7.8  H, Absolute 
Lymphocytes 0.6  L, Absolute Monocytes 0.5, Absolute Eosinophils 0, Absolute 
Basophils 0, Poikilocytosis 2+, Anisocytosis 2+, Ovalocytes 2+, [place] Cells 2+
 
 
Assessment/Plan
Assessment:
Patient is a 77-year-old female with a PMH significant for CKD stage IV, CAD 
status post CABG, PVD status post angioplasty with stent, HTN, uterine and colon
cancer, liver lesions either primary or metastatic cancer, who presented to 
[facility] from rehabilitation due to worsening  poor by mouth intake, 
nausea and vomiting, worsening ascites and worsening renal function.  
 
#RAHUL on CKD 
Likely prerenal azotemia, RAHUL is improving
-Continue IV albumin infusions
-Continue to monitor BEP
-follow-up urine culture
-Follow-up postvoid bladder scan, patient has been incontinent so this may be 
difficult
 
#Worsening ascites
Patient has been following with Dr. Jolly for hepatic masses, metastatic or 
primary lesions.   She has elevated CEA but no definitive diagnosis has been 
made.  Plan for paracentesis and liver biopsy has been delayed due to patient 
taking aspirin the night before admission.
-Ascitic fluid consistent with transudate
-Follow-up ascitic fluid cultures, currently no growth
-Continue albumin infusions, increased to 25 g 3 times a day
-Patient to go for IR liver mass biopsy and therapeutic paracentesis monday 
morning
- need nephrology recommendations about the risk of a paracentesis worsening her
renal function given this RAHUL is likely prerenal azotemia
 
#UTI
Urine culture growing for gram-negative rods, patient also complaining of 
urinary frequency, and persistent nausea
-Start IV ceftriaxone 1 g daily 
 
#chronic pressure ulcer
Wound care consult was placed.  Recommendations appreciated
-Treat with DuoDERM and offloading
 
#Protein calorie malnutrition
Nutrition consult was placed.
-Will follow up nutrition recommendations, for now patient is on sodium 
restricted diet
 
Diet: Heart healthy
DVT prophylaxis:SC heparin, ALPS
Code status: full code
Problem List:
 1. Mass of multiple sites of liver
 
 2. Acute on chronic kidney failure
 
Pain Ratin
Pain Location:
none
Pain Goal: Remain pain free
Pain Plan:
pain pathway
Tomorrow's Labs & Rationales:
cbc, bep, lfts
 
 
aJmes Camp MD 18 1209:
Attending MD Review Statement
 
Attending Statement
Attending MD Statement: examined this patient, discuss w/resident/PA/NP, agreed 
w/resident/PA/NP, reviewed EMR data (avail)
Attending Assessment/Plan:
77F PMH PVD s/p angioplasty with stent, HTN, CAD s/p CABG, CKD stage 4, uterine 
and colon cancer admitted with intractable nausea and vomiting secondary to 
acute gastroenteritis leading to pre-renal azotemia/RAHUL, with recently found 
multiple liver masses suspicious for metastases.
 
Patient is still nauseous today though she is able to tolerate small amounts of 
food.  Per IR, she cannot undergo liver biopsy today and she took an aspirin 3 
days ago.  Renal function is improving, hyponatremia resolved, mildly 
hypokalemic today.
 
1. Pre-renal azotemia/RAHUL secondary to dehydration
2. Acute gastroenteritis
3. Liver metastases
4. Hypokalemia
5. Hyponatremia (resolved)
6. 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
 
Plan
- Continue on general medicine
- Continue IV hydration
- Phenergen PRN for nausea
- Monitor renal function 
- Follow GI and nephrology recommendations
- Will go for large volume paracentesis and liver biopsy on Monday
- Continue Albumin
- Will try to consolidate her pills, as they make her nauseous.  Can change 
Clonidine to 0.2mg tab, 1.5 tablets instead of 3 tablets of 0.1mg.  Can do the 
same with Hydralazine.
- DVT PPx

## 2018-03-10 VITALS — SYSTOLIC BLOOD PRESSURE: 150 MMHG | DIASTOLIC BLOOD PRESSURE: 78 MMHG

## 2018-03-10 VITALS — DIASTOLIC BLOOD PRESSURE: 64 MMHG | SYSTOLIC BLOOD PRESSURE: 164 MMHG

## 2018-03-10 VITALS — DIASTOLIC BLOOD PRESSURE: 68 MMHG | SYSTOLIC BLOOD PRESSURE: 148 MMHG

## 2018-03-10 LAB
ABSOLUTE GRANULOCYTE CT: 9.4 /CUMM (ref 1.4–6.5)
BASOPHILS # BLD: 0 /CUMM (ref 0–0.2)
BASOPHILS NFR BLD: 0.1 % (ref 0–2)
EOSINOPHIL # BLD: 0 /CUMM (ref 0–0.7)
EOSINOPHIL NFR BLD: 0 % (ref 0–5)
ERYTHROCYTE [DISTWIDTH] IN BLOOD BY AUTOMATED COUNT: 14.8 % (ref 11.5–14.5)
GRANULOCYTES NFR BLD: 90.2 % (ref 42.2–75.2)
HCT VFR BLD CALC: 23.3 % (ref 37–47)
LYMPHOCYTES # BLD: 0.6 /CUMM (ref 1.2–3.4)
MCH RBC QN AUTO: 29.8 PG (ref 27–31)
MCHC RBC AUTO-ENTMCNC: 33.1 G/DL (ref 33–37)
MCV RBC AUTO: 89.9 FL (ref 81–99)
MONOCYTES # BLD: 0.4 /CUMM (ref 0.1–0.6)
PLATELET # BLD: 155 /CUMM (ref 130–400)
PMV BLD AUTO: 7.2 FL (ref 7.4–10.4)
RED BLOOD CELL CT: 2.59 /CUMM (ref 4.2–5.4)
WBC # BLD AUTO: 10.4 /CUMM (ref 4.8–10.8)

## 2018-03-10 NOTE — PN- HOUSESTAFF
Pretty PINTO,Theodore 03/10/18 0807:
Subjective
Follow-up For:
RAHUL on CKD
Ascites, with liver masses suspected HCC or liver mets
Subjective:
Patient was seen and examined at bedside.  She is sleeping comfortably.  She has
continued to have nausea and pain in her sacral pressure ulcer.  She reports 
swelling of her right hand.  She has no other new complaints and denies any 
vomiting, chest pain, shortness of breath, fever, chills.
 
Review of Systems
Constitutional:
Denies: chills, fever. 
EENTM:
Reports: no symptoms. 
Cardiovascular:
Reports: no symptoms. 
Respiratory:
Reports: no symptoms. 
Gastrointestinal:
Reports: nausea.  Denies: vomiting. 
Genitourinary:
Reports: no symptoms. 
Musculoskeletal:
Reports: see HPI. 
 
Objective
Last 24 Hrs of Vital Signs/I&O
 Vital Signs
 
 
Date Time Temp Pulse Resp B/P B/P Pulse O2 O2 Flow FiO2
 
     Mean Ox Delivery Rate 
 
03/10 0620 98.6 75 16 150/78  92 Room Air  
 
 2235 98.3 73 20 169/78  94 Room Air  
 
 2206    169/78     
 
 1535 98.7 77 18 120/70  91   
 
 1053 98.8 80 20 134/62     
 
 
 Intake & Output
 
 
 03/10 1600 03/10 0800 03/10 0000
 
Intake Total  1300 300
 
Output Total   
 
Balance  1300 300
 
    
 
Intake, IV  1200 200
 
Intake, Oral  100 100
 
Number   1
 
Bowel   
 
Movements   
 
Patient  120 lb 
 
Weight   
 
Weight  Bed scale 
 
Measurement   
 
Method   
 
 
 
 
Physical Exam
General Appearance: Alert, Oriented X3, Cooperative, Mild Distress
Skin: unstable ulcer on the sacrum, currently dressed in duoderm
Skin Temp/Moisture Exam: Warm/Dry
Cardiovascular: Regular Rate, Normal S1, Normal S2, systolic murmur
Lungs: Clear to Auscultation, Normal Air Movement
Abdomen: No Tenderness, distended, + fluid thrill
Neurological: Normal Speech, Sensation Intact
Extremities: depended pitting edema of the R hand, 1 + pitting edema of the BLEs
Current Medications:
 Current Medications
 
 
  Sig/Jose E Start time  Last
 
Medication Dose Route Stop Time Status Admin
 
Albumin Human 25 GM TID 2200 AC 
 
  IV   2206
 
Albuterol Sulfate 3 ML Q4H PRN  0200 AC 
 
  INH   
 
Amlodipine Besylate 10 MG DAILY  1000 AC 
 
  PO   1053
 
Ceftriaxone Sodium 1,000 MG Q24H 03/09 1600 AC 
 
  IV   1642
 
Cholecalciferol 1,000 IU BID  1000 AC 
 
  PO   1053
 
Clonidine 0.3 MG Q12  1000 AC 
 
  PO   2207
 
Guaifenesin 10 ML Q6P PRN  0200 AC 
 
  PO   
 
Heparin Sodium  5,000 UNIT Q8  2200 AC 
 
(Porcine)  SC   2156
 
Hydralazine HCl 100 MG BID  2200 AC 
 
  PO   2206
 
Hydralazine HCl 75 MG TID  1000 DC 
 
  PO   1807
 
Isosorbide  60 MG DAILY  1000 AC 
 
Mononitrate  PO   0919
 
Metoprolol Tartrate 50 MG DAILY  1000 AC 
 
  PO   1053
 
Omeprazole 40 MG DAILY AC  0700 AC 
 
  PO   0558
 
Oxycodone HCl 5 MG Q8P PRN  0200 AC 
 
  PO   0005
 
Polyethylene Glycol 17 GM BID  1000 AC 
 
  PO   1053
 
Potassium Chloride 40 MEQ Q2H  1000 DC 
 
  PO  1201  1227
 
Promethazine HCl 25 MG Q4P PRN  0800 AC 
 
  IV  0759  
 
Sodium Chloride 1,000 ML Q6H  0930 AC 03/10
 
  IV   0521
 
Tramadol HCl 50 MG DAILY  1000 AC 
 
  PO   1100
 
 
 
 
Last 24 Hrs of Lab/Lio Results
Last 24 Hrs of Labs/Mics:
 Laboratory Tests
 
03/10/18 0720:
Sodium Pending, Potassium Pending, Chloride Pending, Carbon Dioxide Pending, 
Anion Gap Pending, BUN Pending, Creatinine Pending, BUN/Creatinine Ratio Pending
, CBC w Diff Pending, WBC Pending, RBC Pending, Hgb Pending, Hct Pending, MCV 
Pending, MCH Pending, MCHC Pending, RDW Pending, Plt Count Pending, MPV Pending
 
 
Assessment/Plan
Assessment:
Patient is a 77-year-old female with a PMH significant for CKD stage IV, CAD 
status post CABG, PVD status post angioplasty with stent, HTN, uterine and colon
cancer, liver lesions either primary or metastatic cancer, who presented to 
[facility] from rehabilitation due to worsening  poor by mouth intake, 
nausea and vomiting, worsening ascites and worsening renal function.  
 
#RAHUL resolved
Likely prerenal azotemia, RAHUL has resolved
- decreased IV fluid rate given signs of volume overload including new onset R 
hand dependent edema
-Continue IV albumin infusions
-Continue to monitor BEP
- post void bladder scan read 39 ml
 
#anemia
H/H has been trending down
-continue to monitor and transfuse as necessary
 
#UTI
Urine culture positive for Klebsiella pneumonia
-Started on IV ceftriaxone yesterday, will continue
 
#Worsening ascites
Patient has been following with Dr. Jolly for hepatic masses, metastatic or 
primary lesions.   She has elevated CEA but no definitive diagnosis has been 
made.  Plan for paracentesis and liver biopsy has been delayed due to patient 
taking aspirin the night before admission.
-Ascitic fluid consistent with transudate
-Follow-up ascitic fluid cultures, currently no growth
-Continue albumin infusions, increased to 25 g 3 times a day
-Patient to go for IR liver mass biopsy and therapeutic paracentesis monday 
morning
- need nephrology recommendations about the risk of a paracentesis worsening her
renal function given this RAHUL is likely prerenal azotemia
 
#chronic pressure ulcer
Wound care consult was placed.  Recommendations appreciated
-Treat with DuoDERM and offloading
 
#Protein calorie malnutrition
Nutrition consult was placed.
-Will follow up nutrition recommendations, for now patient is on sodium 
restricted diet
 
Diet: Heart healthy
DVT prophylaxis:SC heparin, ALPS
Code status: full code
Problem List:
 1. Mass of multiple sites of liver
 
 2. Pressure ulcer
 
 3. Acute gastroenteritis
 
 4. UTI (urinary tract infection)
 
 5. Anemia
 
Pain Ratin
Pain Location:
none
Pain Goal: Remain pain free
Pain Plan:
pain pathway
Tomorrow's Labs & Rationales:
cbc, bep, LFTs
 
 
James Camp MD 03/10/18 1437:
Attending MD Review Statement
 
Attending Statement
Attending MD Statement: examined this patient, discuss w/resident/PA/NP, agreed 
w/resident/PA/NP, reviewed EMR data (avail)
Attending Assessment/Plan:
77F PMH PVD s/p angioplasty with stent, HTN, CAD s/p CABG, CKD stage 4, uterine 
and colon cancer admitted with intractable nausea and vomiting secondary to 
acute gastroenteritis leading to pre-renal azotemia/RAHUL, with recently found 
multiple liver masses suspicious for metastases.
 
Patient is still nauseous today though she is able to tolerate small amounts of 
food. Renal function is improving, hyponatremia resolved, mildly hypokalemic 
today.
 
1. Pre-renal azotemia/RAHUL secondary to dehydration
2. Acute gastroenteritis
3. Liver metastases
4. Hypokalemia
5. Hyponatremia (resolved)
6. 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
 
Plan
- Continue on general medicine
- Continue IV hydration
- Phenergen PRN for nausea
- Monitor renal function 
- Follow GI and nephrology recommendations
- Will go for large volume paracentesis and liver biopsy on Monday
- Continue Albumin

## 2018-03-11 VITALS — DIASTOLIC BLOOD PRESSURE: 70 MMHG | SYSTOLIC BLOOD PRESSURE: 180 MMHG

## 2018-03-11 VITALS — DIASTOLIC BLOOD PRESSURE: 63 MMHG | SYSTOLIC BLOOD PRESSURE: 156 MMHG

## 2018-03-11 VITALS — SYSTOLIC BLOOD PRESSURE: 142 MMHG | DIASTOLIC BLOOD PRESSURE: 60 MMHG

## 2018-03-11 LAB
ABSOLUTE GRANULOCYTE CT: 7.8 /CUMM (ref 1.4–6.5)
BASOPHILS # BLD: 0 /CUMM (ref 0–0.2)
BASOPHILS NFR BLD: 0.1 % (ref 0–2)
EOSINOPHIL # BLD: 0 /CUMM (ref 0–0.7)
EOSINOPHIL NFR BLD: 0 % (ref 0–5)
ERYTHROCYTE [DISTWIDTH] IN BLOOD BY AUTOMATED COUNT: 14.6 % (ref 11.5–14.5)
GRANULOCYTES NFR BLD: 87.6 % (ref 42.2–75.2)
HCT VFR BLD CALC: 20.4 % (ref 37–47)
LYMPHOCYTES # BLD: 0.7 /CUMM (ref 1.2–3.4)
MCH RBC QN AUTO: 30 PG (ref 27–31)
MCHC RBC AUTO-ENTMCNC: 33.8 G/DL (ref 33–37)
MCV RBC AUTO: 88.8 FL (ref 81–99)
MONOCYTES # BLD: 0.4 /CUMM (ref 0.1–0.6)
PLATELET # BLD: 136 /CUMM (ref 130–400)
PMV BLD AUTO: 7.3 FL (ref 7.4–10.4)
PROTHROMBIN TIME: 13.8 SEC (ref 9.4–12.5)
RED BLOOD CELL CT: 2.3 /CUMM (ref 4.2–5.4)
WBC # BLD AUTO: 8.9 /CUMM (ref 4.8–10.8)

## 2018-03-11 PROCEDURE — 30233N1 TRANSFUSION OF NONAUTOLOGOUS RED BLOOD CELLS INTO PERIPHERAL VEIN, PERCUTANEOUS APPROACH: ICD-10-PCS | Performed by: INTERNAL MEDICINE

## 2018-03-11 NOTE — PN- HOUSESTAFF
Pretty PINTO,Theodore 18 0908:
Subjective
Follow-up For:
RAHUL on CKD
Ascites, with liver masses suspected HCC or liver mets
anemia
Subjective:
Patient was seen and examined at bedside.  She is slightly lethargic today.  She
has continued to have nausea and pain in her sacral pressure ulcer.  She she 
continues to have swelling of the right hand and IV was switched to the left 
today.  She has no other new complaints and denies any vomiting, chest pain, 
shortness of breath, fever, chills.
 
Review of Systems
Constitutional:
Reports: no symptoms. 
EENTM:
Reports: no symptoms. 
Cardiovascular:
Reports: no symptoms. 
Respiratory:
Reports: no symptoms. 
Gastrointestinal:
Reports: distention, nausea.  Denies: melena, vomiting. 
Genitourinary:
Reports: no symptoms. 
Musculoskeletal:
Reports: no symptoms. 
 
Objective
Last 24 Hrs of Vital Signs/I&O
 Vital Signs
 
 
Date Time Temp Pulse Resp B/P B/P Pulse O2 O2 Flow FiO2
 
     Mean Ox Delivery Rate 
 
 0620 98.4 78 18 156/63  92 Room Air  
 
 0000      96 Nasal 1.5L 
 
       Cannula  
 
03/10 2216 98.9 78 20 164/64  96 Nasal 1.5L 
 
       Cannula  
 
03/10 2144  78  164/64     
 
03/10 2127       Nasal 1.5L 
 
       Cannula  
 
03/10 1506 98.4 71 18 148/68  94 Room Air  
 
 
 Intake & Output
 
 
  1600  0800  0000
 
Intake Total  940 1040
 
Output Total   
 
Balance  940 1040
 
    
 
Intake, IV  700 800
 
Intake, Oral  240 240
 
Patient  119 lb 
 
Weight   
 
Weight  Bed scale 
 
Measurement   
 
Method   
 
 
 
 
Physical Exam
General Appearance: Oriented X3, Cooperative, No Acute Distress, lethargic
Skin Temp/Moisture Exam: Warm/Dry
Cardiovascular: Regular Rate, Normal S1, Normal S2
Lungs: diminshed breath sounds
Abdomen: distended with + fluid thrill
Extremities: R hand swelling with no erythema or warmth, LE 1+ pitting edema
Current Medications:
 Current Medications
 
 
  Sig/Jose E Start time  Last
 
Medication Dose Route Stop Time Status Admin
 
Albumin Human 25 GM TID  220 AC 03/10
 
  IV   2146
 
Albuterol Sulfate 3 ML Q4P PRN 03/10 2145 AC 
 
  INH   
 
Albuterol Sulfate 3 ML Q4H PRN  0200 AC 
 
  INH   
 
Amlodipine Besylate 10 MG DAILY  1000 AC 03/10
 
  PO   1041
 
Ceftriaxone Sodium 1,000 MG Q24H  1600 AC 03/10
 
  IV   1615
 
Cholecalciferol 1,000 IU BID  1000 AC 03/10
 
  PO   2144
 
Clonidine 0.3 MG Q12  1000 AC 03/10
 
  PO   2144
 
Guaifenesin 10 ML Q6P PRN  0200 AC 
 
  PO   
 
Heparin Sodium  5,000 UNIT Q8  2200 AC 
 
(Porcine)  SC   2156
 
Hydralazine HCl 100 MG BID  2200 AC 03/10
 
  PO   2144
 
Isosorbide  60 MG DAILY  1000 AC 03/10
 
Mononitrate  PO   1041
 
Metoprolol Tartrate 50 MG DAILY  1000 AC 03/10
 
  PO   1041
 
Omeprazole 40 MG DAILY AC  0700 AC 
 
  PO   0558
 
Ondansetron HCl 4 MG Q6P PRN 03/10 1045 AC 03/10
 
  IV   1600
 
Oxycodone HCl 5 MG Q8P PRN  0200 AC 
 
  PO   0116
 
Polyethylene Glycol 17 GM BID  1000 AC 
 
  PO   1053
 
Promethazine HCl 25 MG Q4P PRN  0800 DC 
 
  IV  0759  
 
Sodium Chloride 1,000 ML Q10H 03/10 1045 AC 03/10
 
  IV   2111
 
Sodium Chloride 1,000 ML Q6H  0930 DC 03/10
 
  IV   0521
 
Tramadol HCl 50 MG DAILY  1000 AC 
 
  PO   1100
 
 
 
 
Last 24 Hrs of Lab/Lio Results
Last 24 Hrs of Labs/Mics:
 Laboratory Tests
 
18 1110:
Anion Gap 13, Estimated GFR 40  L, BUN/Creatinine Ratio 33.8  H, PT 13.8  H, INR
1.26  H, CBC w Diff MAN DIFF ORDERED, RBC 2.30  L, MCV 88.8, MCH 30.0, MCHC 33.8
, RDW 14.6  H, MPV 7.3  L, Gran % 87.6  H, Lymphocytes % 7.5  L, Monocytes % 4.8
, Eosinophils % 0, Basophils % 0.1, Absolute Granulocytes 7.8  H, Absolute 
Lymphocytes 0.7  L, Absolute Monocytes 0.4, Absolute Eosinophils 0, Absolute 
Basophils 0, Platelet Estimate VERIFIED BY SMEAR, Polychromasia 1+, 
Poikilocytosis 1+, Anisocytosis 1+, Ovalocytes 1+, [place] Cells 1+, Schistocytes  
 
 
 
Assessment/Plan
Assessment:
Patient is a 77-year-old female with a PMH significant for CKD stage IV, CAD 
status post CABG, PVD status post angioplasty with stent, HTN, uterine and colon
cancer, liver lesions either primary or metastatic cancer, who presented to 
[facility] from rehabilitation due to worsening  poor by mouth intake, 
nausea and vomiting, worsening ascites and worsening renal function.  
 
#RAHUL resolved
Likely prerenal azotemia, RAHUL has resolved, creatinine is back to baseline
- decreased IV fluid rate given signs of volume overload including new onset R 
hand dependent edema
-Continue IV albumin infusions
-Continue to monitor BEP
- post void bladder scan read 39 ml
 
#anemia
H/H has been trending down
-Patient to receive one he PRBCs transfusion today
 
#UTI
Urine culture positive for Klebsiella pneumonia
-Continue IV ceftriaxone
 
#Worsening ascites
Patient has been following with Dr. Jolly for hepatic masses, metastatic or 
primary lesions.   She has elevated CEA but no definitive diagnosis has been 
made.  Plan for paracentesis and liver biopsy has been delayed due to patient 
taking aspirin the night before admission.
-Ascitic fluid consistent with transudate
-Follow-up ascitic fluid cultures, currently no growth
-Continue albumin infusions, increased to 25 g 3 times a day
-Patient to go for IR liver mass biopsy and therapeutic paracentesis tomorrow 
morning
-Will give supplemental albumin with the tap
 
#chronic pressure ulcer
Wound care consult was placed.  Recommendations appreciated
-Treat with DuoDERM and offloading
 
#Protein calorie malnutrition
Nutrition consult was placed.
-Will follow up nutrition recommendations, for now patient is on sodium 
restricted diet
 
Diet: Heart healthy
DVT prophylaxis:SC heparin, ALPS
Code status: full code
Problem List:
 1. UTI (urinary tract infection)
 
 2. Pressure ulcer
 
 3. Mass of multiple sites of liver
 
 4. Chronic anemia
 
Pain Ratin
Pain Location:
none
Pain Goal: Remain pain free
Pain Plan:
pain pathway
Tomorrow's Labs & Rationales:
cbc, bep
 
 
James Camp MD 18 1323:
Attending MD Review Statement
 
Attending Statement
Attending MD Statement: examined this patient, discuss w/resident/PA/NP, agreed 
w/resident/PA/NP, reviewed EMR data (avail)
Attending Assessment/Plan:
77F PMH PVD s/p angioplasty with stent, HTN, CAD s/p CABG, CKD stage 4, uterine 
and colon cancer admitted with intractable nausea and vomiting secondary to 
acute gastroenteritis leading to pre-renal azotemia/RAHUL, with recently found 
multiple liver masses suspicious for metastases.
 
Patient is still nauseous today though she is able to tolerate small amounts of 
food. Renal function is improving, hyponatremia resolved, mildly hypokalemic 
today.
 
1. Pre-renal azotemia/RAHUL secondary to dehydration
2. Acute gastroenteritis
3. Liver metastases
4. Hypokalemia
5. Hyponatremia (resolved)
6. 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
 
Plan
- Continue on general medicine
- NPO after midnight for biopsy
- Continue IV hydration
- Phenergen PRN for nausea
- Monitor renal function 
- Follow GI and nephrology recommendations
- Will go for large volume paracentesis and liver biopsy on Monday
- Continue Albumin

## 2018-03-12 VITALS — DIASTOLIC BLOOD PRESSURE: 63 MMHG | SYSTOLIC BLOOD PRESSURE: 143 MMHG

## 2018-03-12 VITALS — DIASTOLIC BLOOD PRESSURE: 70 MMHG | SYSTOLIC BLOOD PRESSURE: 128 MMHG

## 2018-03-12 VITALS — SYSTOLIC BLOOD PRESSURE: 136 MMHG | DIASTOLIC BLOOD PRESSURE: 68 MMHG

## 2018-03-12 LAB
ABSOLUTE GRANULOCYTE CT: 11 /CUMM (ref 1.4–6.5)
BASOPHILS # BLD: 0 /CUMM (ref 0–0.2)
BASOPHILS NFR BLD: 0.3 % (ref 0–2)
EOSINOPHIL # BLD: 0 /CUMM (ref 0–0.7)
EOSINOPHIL NFR BLD: 0.1 % (ref 0–5)
ERYTHROCYTE [DISTWIDTH] IN BLOOD BY AUTOMATED COUNT: 14.6 % (ref 11.5–14.5)
GRANULOCYTES NFR BLD: 89.4 % (ref 42.2–75.2)
HCT VFR BLD CALC: 31.8 % (ref 37–47)
LYMPHOCYTES # BLD: 0.6 /CUMM (ref 1.2–3.4)
MCH RBC QN AUTO: 29.9 PG (ref 27–31)
MCHC RBC AUTO-ENTMCNC: 33.4 G/DL (ref 33–37)
MCV RBC AUTO: 89.4 FL (ref 81–99)
MONOCYTES # BLD: 0.6 /CUMM (ref 0.1–0.6)
PLATELET # BLD: 136 /CUMM (ref 130–400)
PMV BLD AUTO: 7.5 FL (ref 7.4–10.4)
PROTHROMBIN TIME: 12.9 SEC (ref 9.4–12.5)
RED BLOOD CELL CT: 3.56 /CUMM (ref 4.2–5.4)
WBC # BLD AUTO: 12.3 /CUMM (ref 4.8–10.8)

## 2018-03-12 PROCEDURE — 0W9G3ZZ DRAINAGE OF PERITONEAL CAVITY, PERCUTANEOUS APPROACH: ICD-10-PCS

## 2018-03-12 PROCEDURE — 0FB23ZX EXCISION OF LEFT LOBE LIVER, PERCUTANEOUS APPROACH, DIAGNOSTIC: ICD-10-PCS

## 2018-03-12 NOTE — PN- HOUSESTAFF
Pretty PINTO,Theodore 03/12/18 0722:
Subjective
Follow-up For:
RAHUL on CKD
Ascites, with liver masses suspected HCC or liver mets
anemia
Subjective:
 Patient was seen and examined at bedside.  She was sleeping comfortably.  She 
received 1 unit PRBCs yesterday and tolerated this well.  Her nausea has 
improved.  She is continuing to complain of pain on her coccyx from her chronic 
pressure ulcer and generalized malaise.  She has had a poor appetite. She has no
other new complaints.  She had an episode of hypoxia yesterday and was started 
on 1.5 L O2 NC.  She denies SOB currently.
 
Review of Systems
Constitutional:
Reports: malaise. 
Cardiovascular:
Denies: chest pain, orthopena. 
Respiratory:
Reports: short of breath.  Denies: cough. 
Gastrointestinal:
Denies: abdominal pain, nausea, vomiting. 
Genitourinary:
Reports: no symptoms. 
Musculoskeletal:
Reports: no symptoms. 
 
Objective
Last 24 Hrs of Vital Signs/I&O
 Vital Signs
 
 
Date Time Temp Pulse Resp B/P B/P Pulse O2 O2 Flow FiO2
 
     Mean Ox Delivery Rate 
 
03/12 0620 98.1 73 18 136/68  94 Room Air  
 
03/12 0000      92 Nasal 1.5L 
 
       Cannula  
 
03/11 2229 98.3 77 18 180/70  92 Room Air  
 
03/11 2130  77  193/73     
 
03/11 2055      93 Nasal 1.5L 
 
       Cannula  
 
03/11 1439 98.0 62 18 142/60  98   
 
03/11 1043      89 Room Air  
 
03/11 0800       Nasal 2.0L 
 
       Cannula  
 
 
 Intake & Output
 
 
 03/12 0800 03/12 0000 03/11 1600
 
Intake Total   950
 
Output Total   
 
Balance   950
 
    
 
Intake, IV   700
 
Intake, Oral   250
 
Patient 120 lb  
 
Weight   
 
Weight Bed scale  
 
Measurement   
 
Method   
 
 
 
 
Physical Exam
General Appearance: Alert, Oriented X3, Cooperative, Mild Distress
Skin Temp/Moisture Exam: Warm/Dry
Cardiovascular: Regular Rate, Normal S1, Normal S2, systolic murmur
Lungs: diminished breath sounds at the lung bases
Abdomen: distended, + fluid thrill
Neurological: Normal Speech, Normal Tone, Sensation Intact
Extremities: R hand edema improving, 1 + pitting edema of the distal LEs
Current Medications:
 Current Medications
 
 
  Sig/Jose E Start time  Last
 
Medication Dose Route Stop Time Status Admin
 
Albumin Human 25 GM TID 03/08 2200 AC 03/11
 
  IV   2140
 
Albuterol Sulfate 3 ML Q4P PRN 03/10 2145 AC 
 
  INH   
 
Albuterol Sulfate 3 ML Q4H PRN 03/07 0200 AC 
 
  INH   
 
Amlodipine Besylate 10 MG DAILY 03/07 1000 AC 03/11
 
  PO   1133
 
Ceftriaxone Sodium 1,000 MG Q24H 03/09 1600 AC 03/11
 
  IV   1558
 
Cholecalciferol 1,000 IU BID 03/07 1000 AC 03/11
 
  PO   2130
 
Clonidine 0.3 MG Q12 03/07 1000 AC 03/11
 
  PO   2130
 
Dextrose/Sodium  1,000 ML Q13H 03/11 1030 AC 03/12
 
Chloride  IV   0458
 
Glycerin 2 SPRAY Q2P PRN 03/11 1915 AC 03/12
 
  PO   0457
 
Guaifenesin 10 ML Q6P PRN 03/07 0200 AC 
 
  PO   
 
Heparin Sodium  5,000 UNIT Q8 03/07 2200 AC 03/07
 
(Porcine)  SC   2156
 
Hydralazine HCl 100 MG BID 03/09 2200 AC 03/11
 
  PO   2130
 
Isosorbide  60 MG DAILY 03/07 1000 AC 03/11
 
Mononitrate  PO   1133
 
Metoprolol Tartrate 50 MG DAILY 03/07 1000 AC 03/11
 
  PO   1133
 
Omeprazole 40 MG DAILY AC 03/07 0700 AC 03/08
 
  PO   0558
 
Ondansetron HCl 4 MG Q6P PRN 03/10 1045 AC 03/10
 
  IV   1600
 
Oxycodone HCl 5 MG Q8P PRN 03/07 0200 AC 03/12
 
  PO   0030
 
Polyethylene Glycol 17 GM BID 03/07 1000 AC 03/09
 
  PO   1053
 
Sodium Chloride 1,000 ML Q10H 03/10 1045 DC 03/10
 
  IV   2111
 
Tramadol HCl 50 MG DAILY 03/07 1000 AC 03/09
 
  PO   1100
 
 
 
 
Last 24 Hrs of Lab/Lio Results
Last 24 Hrs of Labs/Mics:
 Laboratory Tests
 
03/11/18 1110:
Anion Gap 13, Estimated GFR 40  L, BUN/Creatinine Ratio 33.8  H, PT 13.8  H, INR
1.26  H, CBC w Diff MAN DIFF ORDERED, RBC 2.30  L, MCV 88.8, MCH 30.0, MCHC 33.8
, RDW 14.6  H, MPV 7.3  L, Gran % 87.6  H, Lymphocytes % 7.5  L, Monocytes % 4.8
, Eosinophils % 0, Basophils % 0.1, Absolute Granulocytes 7.8  H, Absolute 
Lymphocytes 0.7  L, Absolute Monocytes 0.4, Absolute Eosinophils 0, Absolute 
Basophils 0, Platelet Estimate VERIFIED BY SMEAR, Polychromasia 1+, 
Poikilocytosis 1+, Anisocytosis 1+, Ovalocytes 1+, [place] Cells 1+, Schistocytes  
 
 
 
Assessment/Plan
Assessment:
Patient is a 77-year-old female with a PMH significant for CKD stage IV, CAD 
status post CABG, PVD status post angioplasty with stent, HTN, uterine and colon
cancer, liver lesions either primary or metastatic cancer, who presented to 
[facility] from rehabilitation due to worsening  poor by mouth intake, 
nausea and vomiting, worsening ascites and worsening renal function.  
 
#RAHUL resolved
Likely prerenal azotemia, RAHUL has resolved, creatinine is back to baseline
- decreased IV fluid rate given signs of volume overload including new onset R 
hand dependent edema
-Continue IV albumin infusions
-Continue to monitor BEP
- post void bladder scan read 39 ml
 
#anemia
H/H has been trending down
-Patient tolerated RBC transfusion yesterday.  We'll continue to trend H/H
-Follow-up stool guaiac
 
#UTI
Urine culture positive for Klebsiella pneumonia
-Continue IV ceftriaxone, day 4
 
#Worsening ascites
Patient has been following with Dr. Jolly for hepatic masses, metastatic or 
primary lesions.   She has elevated CEA but no definitive diagnosis has been 
made.  Plan for paracentesis and liver biopsy has been delayed due to patient 
taking aspirin the night before admission.
-Follow-up ascitic fluid cultures, currently no growth
-Continue albumin infusions, 25 g 3 times a day
-patient to go for therapeutic paracentesis with liver mass biopsy today
-Will give supplemental albumin with the tap
-Follow-up results of biopsy
 
#chronic pressure ulcer
Wound care consult was placed.  Recommendations appreciated
-Treat with DuoDERM and offloading
 
#Protein calorie malnutrition
Nutrition consult was placed.
-Will follow up nutrition recommendations, for now patient is on sodium 
restricted diet
 
Diet: NPO pending IR procedure
DVT prophylaxis:SC heparin, ALPS
Code status: full code
Problem List:
 1. UTI (urinary tract infection)
 
 2. Acute gastroenteritis
 
 3. Pressure ulcer
 
 4. Mass of multiple sites of liver
 
Pain Rating: 3
Pain Location:
sacrum
Pain Goal: Pain 4 or less
Pain Plan:
pain pathway
Tomorrow's Labs & Rationales:
cbc, bep
 
 
James Camp MD 03/12/18 1428:
Attending MD Review Statement
 
Attending Statement
Attending MD Statement: examined this patient, discuss w/resident/PA/NP, agreed 
w/resident/PA/NP, reviewed EMR data (avail)
Attending Assessment/Plan:
77F PMH PVD s/p angioplasty with stent, HTN, CAD s/p CABG, CKD stage 4, uterine 
and colon cancer admitted with intractable nausea and vomiting secondary to 
acute gastroenteritis leading to pre-renal azotemia/RAHUL, with recently found 
multiple liver masses suspicious for metastases.
 
Patient is still nauseous today though she is able to tolerate small amounts of 
food. Renal function is improving, hyponatremia resolved, mildly hypokalemic 
today.
 
1. Pre-renal azotemia/RAHUL secondary to dehydration
2. Acute gastroenteritis
3. Liver metastases
4. Hypokalemia
5. Hyponatremia (resolved)
6. 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
 
Plan
- Continue on general medicine
- Psychiatry consult for depression
- Continue IV hydration
- Phenergen PRN for nausea
- Monitor renal function 
- Follow GI and nephrology recommendations
- Will go for large volume paracentesis and liver biopsy today
- Continue Albumin

## 2018-03-12 NOTE — PN- GASTROENTEROLOGY
Assessment/Plan GI
Assessment/Recommendations:
77 year old female admitted with worsening ascites and renal failure and liver 
lesions on ct scan suggestive of metastatic disease.  She underwent a diagnostic
paracentesis on March 8 which was negative for sbp by cell count and her SAAG 
was greater then 1.1 indicating the ascitic fluid is from portal hypertension 
and not necessarily from a malignancy; cytology was negative.  Her kidney 
function has improved with albumin infusions.  Her hematocrit isabella more than 
expected to transfusion 1 unit of packed red blood cells.  She is being treated 
for UTI.
 
Recommendations:
* Stop albumin
* Follow daily renal function and electrolytes.
* Await results of liver biopsy
* Change diet to 2 g sodium
* Follow-up CBC
* Bowel regimen
 
 
 
Subjective
Subjective:
Tolerated large volume paracentesis (9 L) and liver biopsy today.  Complains of 
burning pain at location of pads on coccyx and hips.  Poor appetite, mild nausea
, no abdominal pain.
 
Objective
Vital Signs and I&Os
Vital Signs
 
 
Date Time Temp Pulse Resp B/P B/P Pulse O2 O2 Flow FiO2
 
     Mean Ox Delivery Rate 
 
03/12 1800 98.4 76 18 128/70  95 Nasal 2.0L 
 
       Cannula  
 
03/12 1730  77  128/70     
 
03/12 1730  77  128/70     
 
03/12 1730  77  128/70     
 
03/12 1730  77  128/70     
 
03/12 1706      97 Nasal 2.0L 
 
       Cannula  
 
03/12 1700  77  128/70     
 
03/12 0620 98.1 73 18 136/68  94 Room Air  
 
03/12 0000      92 Nasal 1.5L 
 
       Cannula  
 
03/11 2229 98.3 77 18 180/70  92 Room Air  
 
 
 Intake & Output
 
 
 03/12 1600 03/12 0400 03/11 1600 03/11 0400 03/10 1600 03/10 0400
 
Intake Total   1890 1040 2350 300
 
Output Total      
 
Balance   1890 1040 2350 300
 
       
 
Intake, IV   6690 170 9161 200
 
Intake, Oral   490 240 350 100
 
Number      1
 
Bowel      
 
Movements      
 
Patient 120 lb  119 lb  120 lb 
 
Weight      
 
Weight Bed scale  Bed scale  Bed scale 
 
Measurement      
 
Method      
 
 
 
Physical Exam:
Alert and oriented.  Thin/cachectic.  Sclera anicteric.  No adenopathy.  No 
peripheral edema.  Abdomen markedly less distended, nontender, soft.
Current Medications:
 Current Medications
 
 
  Sig/Jose E Start time  Last
 
Medication Dose Route Stop Time Status Admin
 
Albumin Human 62.5 GM ONCE ONE 03/12 1700 DC 03/12
 
  IV 03/12 1701  1911
 
Albumin Human 64 GM ONCE ONE 03/12 1600 CAN 
 
  IV 03/12 1601  
 
Albumin Human 25 GM TID 03/08 2200 AC 03/12
 
  IV   2128
 
Albuterol Sulfate 3 ML Q4P PRN 03/10 2145 AC 
 
  INH   
 
Albuterol Sulfate 3 ML Q4H PRN 03/07 0200 AC 
 
  INH   
 
Amlodipine Besylate 10 MG DAILY 03/07 1000 AC 03/12
 
  PO   1730
 
Ceftriaxone Sodium 1,000 MG Q24H 03/09 1600 AC 03/12
 
  IV   1908
 
Cholecalciferol 1,000 IU BID 03/07 1000 AC 03/12
 
  PO   1909
 
Clonidine 0.3 MG Q12 03/07 1000 AC 03/12
 
  PO   1730
 
Dextrose/Sodium  1,000 ML Q13H 03/11 1030 AC 03/12
 
Chloride  IV   1909
 
Fentanyl Citrate 0 .STK-MED ONE 03/12 1240 DC 
 
  .ROUTE   
 
Gelatin 1 UNIT .STK-MED ONE 03/12 1332 DC 
 
  TOP 03/12 1333  
 
Glycerin 2 SPRAY Q2P PRN 03/11 1915 AC 03/12
 
  PO   0457
 
Guaifenesin 10 ML Q6P PRN 03/07 0200 AC 
 
  PO   
 
Heparin Sodium  5,000 UNIT Q8 03/07 2200 AC 03/12
 
(Porcine)  SC   1730
 
Hydralazine HCl 100 MG BID 03/09 2200 AC 03/12
 
  PO   1730
 
Isosorbide  60 MG DAILY 03/07 1000 AC 03/12
 
Mononitrate  PO   1730
 
Lidocaine 1 ML .STK-MED ONE 03/12 1332 DC 
 
  ID 03/12 1333  
 
Metoprolol Tartrate 50 MG DAILY 03/07 1000 AC 03/12
 
  PO   1700
 
Midazolam HCl 0 .STK-MED ONE 03/12 1240 DC 
 
  .ROUTE   
 
Omeprazole 40 MG DAILY AC 03/07 0700 AC 03/08
 
  PO   0558
 
Ondansetron HCl 4 MG Q6P PRN 03/10 1045 AC 03/10
 
  IV   1600
 
Oxycodone HCl 5 MG Q8P PRN 03/07 0200 AC 03/12
 
  PO   0030
 
Polyethylene Glycol 17 GM BID 03/07 1000 AC 03/12
 
  PO   1730
 
Tramadol HCl 50 MG DAILY 03/07 1000 AC 03/12
 
  PO   1730
 
 
 
 
Results
Pertinent Lab Results:
 Laboratory Tests
 
 
 03/12 03/11
 
 0925 1110
 
Chemistry  
 
  Sodium (137 - 145 mmol/L) 137 138
 
  Potassium (3.5 - 5.1 mmol/L) 4.0 4.2
 
  Chloride (98 - 107 mmol/L) 107 107
 
  Carbon Dioxide (22 - 30 mmol/L) 21  L 18  L
 
  Anion Gap (5 - 16) 10 13
 
  BUN (7 - 17 mg/dL) 43  H 44  H
 
  Creatinine (0.5 - 1.0 mg/dL) 1.2  H 1.3  H
 
  Estimated GFR (>60 ml/min) 44  L 40  L
 
  BUN/Creatinine Ratio (7 - 25 %) 35.8  H 33.8  H
 
Coagulation  
 
  PT (9.4 - 12.5 SEC) 12.9  H 13.8  H
 
  INR (0.90 - 1.19) 1.18 1.26  H
 
Hematology  
 
  CBC w Diff MAN DIFF ORDERED MAN DIFF ORDERED
 
  WBC (4.8 - 10.8 /CUMM) 12.3  H 8.9
 
  RBC (4.20 - 5.40 /CUMM) 3.56  L 2.30  L
 
  Hgb (12.0 - 16.0 G/DL) 10.6  L 6.9 *L
 
  Hct (37 - 47 %) 31.8  L 20.4  L
 
  MCV (81.0 - 99.0 FL) 89.4 88.8
 
  MCH (27.0 - 31.0 PG) 29.9 30.0
 
  MCHC (33.0 - 37.0 G/DL) 33.4 33.8
 
  RDW (11.5 - 14.5 %) 14.6  H 14.6  H
 
  Plt Count (130 - 400 /CUMM) 136 136
 
  MPV (7.4 - 10.4 FL) 7.5 7.3  L
 
  Gran % (42.2 - 75.2 %) 89.4  H 87.6  H
 
  Lymphocytes % (20.5 - 51.1 %) 5.2  L 7.5  L
 
  Monocytes % (1.7 - 9.3 %) 5.0 4.8
 
  Eosinophils % (0 - 5 %) 0.1 0
 
  Basophils % (0.0 - 2.0 %) 0.3 0.1
 
  Absolute Granulocytes (1.4 - 6.5 /CUMM) 11.0  H 7.8  H
 
  Absolute Lymphocytes (1.2 - 3.4 /CUMM) 0.6  L 0.7  L
 
  Absolute Monocytes (0.10 - 0.60 /CUMM) 0.6 0.4
 
  Absolute Eosinophils (0.0 - 0.7 /CUMM) 0 0
 
  Absolute Basophils (0.0 - 0.2 /CUMM) 0 0
 
  Platelet Estimate (ADEQUATE) ADEQUATE VERIFIED BY SMEAR
 
  Polychromasia  1+
 
  Poikilocytosis 1+ 1+
 
  Anisocytosis 1+ 1+
 
  Ovalocytes  1+
 
  [place] Cells  1+
 
  Schistocytes    
 
 
 
 
 03/10
 
 0720
 
Chemistry 
 
  Sodium (137 - 145 mmol/L) 139
 
  Potassium (3.5 - 5.1 mmol/L) 4.3
 
  Chloride (98 - 107 mmol/L) 109  H
 
  Carbon Dioxide (22 - 30 mmol/L) 20  L
 
  Anion Gap (5 - 16) 10
 
  BUN (7 - 17 mg/dL) 50  H
 
  Creatinine (0.5 - 1.0 mg/dL) 1.4  H
 
  Estimated GFR (>60 ml/min) 37  L
 
  BUN/Creatinine Ratio (7 - 25 %) 35.7  H
 
Hematology 
 
  CBC w Diff MAN DIFF ORDERED
 
  WBC (4.8 - 10.8 /CUMM) 10.4
 
  RBC (4.20 - 5.40 /CUMM) 2.59  L
 
  Hgb (12.0 - 16.0 G/DL) 7.7  L
 
  Hct (37 - 47 %) 23.3  L
 
  MCV (81.0 - 99.0 FL) 89.9
 
  MCH (27.0 - 31.0 PG) 29.8
 
  MCHC (33.0 - 37.0 G/DL) 33.1
 
  RDW (11.5 - 14.5 %) 14.8  H
 
  Plt Count (130 - 400 /CUMM) 155
 
  MPV (7.4 - 10.4 FL) 7.2  L
 
  Gran % (42.2 - 75.2 %) 90.2  H
 
  Lymphocytes % (20.5 - 51.1 %) 5.5  L
 
  Monocytes % (1.7 - 9.3 %) 4.2
 
  Eosinophils % (0 - 5 %) 0
 
  Basophils % (0.0 - 2.0 %) 0.1
 
  Absolute Granulocytes (1.4 - 6.5 /CUMM) 9.4  H
 
  Absolute Lymphocytes (1.2 - 3.4 /CUMM) 0.6  L
 
  Absolute Monocytes (0.10 - 0.60 /CUMM) 0.4
 
  Absolute Eosinophils (0.0 - 0.7 /CUMM) 0
 
  Absolute Basophils (0.0 - 0.2 /CUMM) 0
 
  Platelet Estimate (ADEQUATE) VERIFIED BY SMEAR
 
  Polychromasia 1+
 
  Poikilocytosis 2+
 
  Anisocytosis 1+
 
  Ovalocytes 1+
 
  [place] Cells 2+

## 2018-03-12 NOTE — ULTRASOUND REPORT
CLINICAL HISTORY:
This patient is a 77 years old Female with ascites found on physical exam,
who is referred to Interventional Radiology for ultrasound-guided
paracentesis.
 
PROCEDURE:
Ultrasound-guided paracentesis.
 
PHYSICIANS:
Dr. Sary Martin (attending).
 
MEDICATIONS:
10 mL of 1% lidocaine SQ.
 
COMPLICATIONS: None
ESTIMATED BLOOD LOSS: <5 mL
SPECIMENS: None
IMPLANT: None.
 
SITE MARKING:
As part of the preprocedure verification policy, a site marking procedure was
initiated. Due to the nature the procedure, the insertion site could not be
predetermined thus invoking the policy of exemption to site laterality and
marking. Insertion site marking was performed in the procedure room in
conjunction with imaging confirmation.
 
PROCEDURE NOTE:
Informed consent was obtained from the patient prior to the procedure. During
this process, the procedure and potential alternatives were explained along
with the intended outcome and benefits. The risks of the procedure, including
the possibility of an unsuccessful procedure, as well as the risk of not
doing the procedure, were discussed. The patient was given the opportunity to
ask questions regarding the procedure and appeared competent to make
decisions. A signed consent form documenting this discussion was placed in
the medical record. A time-out procedure was performed.
 
Appropriate preprocedure medical history and imaging studies were reviewed.
The patient was brought to the ultrasound room and placed in the supine
position. A time-out procedure was performed. Ultrasound images of the
abdomen were obtained to localize a large collection of ascites. Images were
permanently saved to the record.
 
An area of the left lower quadrant was prepped and draped in the standard
sterile fashion. All elements of maximal sterile barrier technique followed
including use of cap, mask, sterile gown, sterile gloves, a sterile full body
drape and hand hygiene. Also followed skin preparation with 2% chlorhexidine
for cutaneous antisepsis, and sterile ultrasound preparation with sterile gel
and probe cover when applicable. 10 mL of 1% lidocaine was used to obtain
local anesthesia of the skin and deeper tissues. A standard small-bore needle
was introduced to sample fluid and demonstrated a safe access route. There
was no evidence of traversing adjacent organs or vascular structures. A 6-Fr
Safe-T-Centesis closed needle/catheter system was utilized for access.  8100
mL of clear straw-colored fluid was aspirated before drainage ceased. The
catheter was removed and sterile dressing applied.
 
The patient tolerated the procedure well without evidence of complications.
 
FINDINGS:
Large simple abdominal ascites as detailed above.
 
IMPRESSION:
Successful ultrasound-guided therapeutic paracentesis.
 
PLAN:
The patient was stable after the procedure. The patient will be transferred
to the floor.

## 2018-03-12 NOTE — ULTRASOUND REPORT
CLINICAL HISTORY:
This patient is a 77 years old female with liver masses who presents to
Interventional Radiology for targeted ultrasound-guided percutaneous liver
biopsy.
 
PROCEDURE:
1. Limited sonographic evaluation of the liver.
2. Ultrasound-guided targeted needle biopsy of the liver lesion.
 
PHYSICIANS:
Dr. Sary Martin (attending).
 
MONITORING:
The procedure was performed with conscious sedation and analgesia under my
direct supervision. Continuous blood pressure, pulse oximetry as well as
heartrate monitoring was performed by an independent registered nurse.
 
Physician intraservice sedation time was 20 minutes.
 
MEDICATIONS:
1. Versed 0.5 mg and fentanyl 50 mcg IV were administered.
2. Lidocaine 1%, 5 mL, SQ.
 
COMPLICATIONS: None
ESTIMATED BLOOD LOSS: <50 mL
SPECIMENS: 2 separate 20-gauge core specimens of the left hepatic lobe mass
IMPLANT: None
 
SITE MARKING:
As part of the preprocedure verification policy, a site marking procedure was
initiated. Due to the nature the procedure, the insertion site could not be
predetermined thus invoking the policy of exemption to site laterality and
marking. Insertion site marking was performed in the procedure room in
conjunction with imaging confirmation.
 
PROCEDURE NOTE:
Informed consent was obtained from the patient prior to the procedure. During
this process, the procedure and potential alternatives were explained along
with the intended outcome and benefits. The risks of the procedure, including
the possibility of an unsuccessful procedure, as well as the risk of not
doing the procedure, were discussed. The patient was given the opportunity to
ask questions regarding the procedure and appeared competent to make
decisions. A signed consent form documenting this discussion was placed in
the medical record. A time-out procedure was performed.
 
The patient was placed supine on the US table. The right upper abdomen was
prepped and draped in the usual sterile fashion. All elements of maximal
sterile barrier technique followed including use of cap, mask, sterile gown,
sterile gloves, a sterile full body drape and hand hygiene. Also followed
skin preparation with 2% chlorhexidine for cutaneous antisepsis, and sterile
ultrasound preparation with sterile gel and probe cover when applicable. 5 mL
of 1% lidocaine was used to obtain local anesthesia of the skin and deeper
tissues.
 
Focal ultrasound of the right liver was performed to access for an approach.
Local anesthetic with lidocaine was administered under ultrasound guidance.
Under ultrasound guidance, the 19 gauge double-wall needle from the [employer]
biopsy set was advanced into the lesion within the left liver. The 20 gauge
core biopsy needle from the biopsy set was advanced through the double-wall
needle. A total of 2 core biopsy samples were obtained. Under ultrasound
guidance, the needle track was closed with Gelfoam while the needle was
removed. Manual pressure was applied to the track until hemostasis was
achieved. Postprocedure ultrasound did not demonstrate the presence of
subcapsular hematoma.
 
The patient tolerated the procedure well.
 
FINDINGS:
1. Successful core needle biopsy of the left-sided liver lesion.
2. No hepatic hematoma or subcapsular hematoma after removal of the biopsy
needle.
 
IMPRESSION:
Successful core needle biopsy of the left liver lesion.
 
PLAN:
1. The patient was stable after the procedure and was transferred to the
interventional recovery area for observation. The patient will be transferred
to the floor.
2. Patient will be instructed to lay on the site of biopsy for 1 hour to
minimize risk of subcapsular hematoma.
3. Core biopsy samples will be sent to pathology.

## 2018-03-13 VITALS — SYSTOLIC BLOOD PRESSURE: 153 MMHG | DIASTOLIC BLOOD PRESSURE: 72 MMHG

## 2018-03-13 VITALS — DIASTOLIC BLOOD PRESSURE: 80 MMHG | SYSTOLIC BLOOD PRESSURE: 140 MMHG

## 2018-03-13 VITALS — DIASTOLIC BLOOD PRESSURE: 56 MMHG | SYSTOLIC BLOOD PRESSURE: 128 MMHG

## 2018-03-13 LAB
ABSOLUTE GRANULOCYTE CT: 9.7 /CUMM (ref 1.4–6.5)
BASOPHILS # BLD: 0 /CUMM (ref 0–0.2)
BASOPHILS NFR BLD: 0 % (ref 0–2)
EOSINOPHIL # BLD: 0 /CUMM (ref 0–0.7)
EOSINOPHIL NFR BLD: 0.1 % (ref 0–5)
ERYTHROCYTE [DISTWIDTH] IN BLOOD BY AUTOMATED COUNT: 14.1 % (ref 11.5–14.5)
GRANULOCYTES NFR BLD: 90.7 % (ref 42.2–75.2)
HCT VFR BLD CALC: 31 % (ref 37–47)
LYMPHOCYTES # BLD: 0.5 /CUMM (ref 1.2–3.4)
MCH RBC QN AUTO: 29.8 PG (ref 27–31)
MCHC RBC AUTO-ENTMCNC: 34.1 G/DL (ref 33–37)
MCV RBC AUTO: 87.5 FL (ref 81–99)
MONOCYTES # BLD: 0.4 /CUMM (ref 0.1–0.6)
PLATELET # BLD: 99 /CUMM (ref 130–400)
PMV BLD AUTO: 7.9 FL (ref 7.4–10.4)
RED BLOOD CELL CT: 3.54 /CUMM (ref 4.2–5.4)
WBC # BLD AUTO: 10.6 /CUMM (ref 4.8–10.8)

## 2018-03-13 NOTE — DISCHARGE SUMMARY
Visit Information
 
Visit Dates
Admission Date:
03/06/18
 
Discharge Date:
03/14/18
 
 
Hospital Course
 
Course
Attending Physician:
James Camp MD
 
Primary Care Physician:
Jairon PINTO,Adalberto CARLSON
 
Hospital Course:
Patient is a 77-year-old female with a PMH significant for CKD stage IV, CAD 
status post CABG, PVD status post angioplasty with stent, HTN, uterine and colon
cancer, liver lesions that could be either primary or metastatic (work-up 
ongoing), who presented to [facility] from rehabilitation due to worsening
 poor by mouth intake, nausea and vomiting, worsening ascites and worsening 
renal function.  
 
#RAHUL on CKD stage 3 
Now resolved. Likely prerenal azotemia due to poor PO intake, third spacing and 
vomiting. Creatinine is back to baseline. Patient was evaluated by nephrology 
and given IV albumin infusions and IV fluids to replete and maintain her 
intravascular volume. 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 
Creatinine remains stable on discharge.
 
#Liver masses with ascites
Unclear if liver is the primary of this is metastasis from somewhere else. 
Patient had an US guided liver biopsy on 03/12/18 without complication. She had 
an US guided diagnostic paracentesis on 03/07/18 with cytology showing and a 
theraputic paracentesis at which time 8100 mL of clear straw-colored fluid was 
aspirated. SBP was ruled out. Liver Biopsy results pending, please follow up.
 
#Chronic normocytic anemia
Likely from CKD. She recieved a unit of blood transfusion for a drop in H&H 
which has now stabilized. Monitor CBC. Patient may require EPO injections in 
future.
 
#UTI
Urine culture positive for Klebsiella pneumonia. Treated with IV ceftriaxone x 5
days.
 
#chronic pressure ulcer
Wound care consult was placed. Treat with DuoDERM and offloading.
 
#Protein calorie malnutrition
Patient is on a 2g sodium restricted regular diet.
 
 
 
 
Allergies:
Coded Allergies:
Sulfa (Sulfonamide Antibiotics) (Severe, HIVES 11/20/16)
 
Significant Procedures:
US guided Paracentesis on 03/07/18-Diagnostic-60ml
US guided Paracentesis on 03/12/18-Therapeutic-8100ml 
US guided liver biopsy
 
Pertinent Lab Results:
Laboratory Tests
 
03/13/18 0852:
Anion Gap 7, Estimated GFR 54  L, BUN/Creatinine Ratio 38.0  H, Total Bilirubin 
0.7, Direct Bilirubin 0.7  H, AST 13  L, ALT 23, Alkaline Phosphatase 169  H, 
Total Protein 4.2  L, Albumin 2.3  L, CBC w Diff NO MAN DIFF REQ, RBC 3.54  L, 
MCV 87.5, MCH 29.8, MCHC 34.1, RDW 14.1, MPV 7.9, Gran % 90.7  H, Lymphocytes % 
5.1  L, Monocytes % 4.1, Eosinophils % 0.1, Basophils % 0, Absolute Granulocytes
9.7  H, Absolute Lymphocytes 0.5  L, Absolute Monocytes 0.4, Absolute 
Eosinophils 0, Absolute Basophils 0
 
03/12/18 0925:
Anion Gap 10, Estimated GFR 44  L, BUN/Creatinine Ratio 35.8  H, PT 12.9  H, INR
1.18, CBC w Diff MAN DIFF ORDERED, RBC 3.56  L, MCV 89.4, MCH 29.9, MCHC 33.4, 
RDW 14.6  H, MPV 7.5, Gran % 89.4  H, Lymphocytes % 5.2  L, Monocytes % 5.0, 
Eosinophils % 0.1, Basophils % 0.3, Absolute Granulocytes 11.0  H, Absolute 
Lymphocytes 0.6  L, Absolute Monocytes 0.6, Absolute Eosinophils 0, Absolute 
Basophils 0, Platelet Estimate ADEQUATE, Poikilocytosis 1+, Anisocytosis 1+
 
03/11/18 1110:
Anion Gap 13, Estimated GFR 40  L, BUN/Creatinine Ratio 33.8  H, PT 13.8  H, INR
1.26  H, CBC w Diff MAN DIFF ORDERED, RBC 2.30  L, MCV 88.8, MCH 30.0, MCHC 33.8
, RDW 14.6  H, MPV 7.3  L, Gran % 87.6  H, Lymphocytes % 7.5  L, Monocytes % 4.8
, Eosinophils % 0, Basophils % 0.1, Absolute Granulocytes 7.8  H, Absolute 
Lymphocytes 0.7  L, Absolute Monocytes 0.4, Absolute Eosinophils 0, Absolute 
Basophils 0, Platelet Estimate VERIFIED BY SMEAR, Polychromasia 1+, 
Poikilocytosis 1+, Anisocytosis 1+, Ovalocytes 1+, Jodi Cells 1+, Schistocytes  
 
 
 
Disposition Summary
 
Disposition
Principal Diagnosis:
#RAHUL on CKD stage 3 
Additional Diagnosis:
#Liver masses with ascites
#Chronic normocytic anemia
#UTI
#chronic pressure ulcer
#Protein calorie malnutrition
Discharge Disposition: STR
 
Discharge Instructions
 
General Discharge Information
Code Status: Full Code
Patient's Diet:
2gm sodium restricted regular diet
Patient's Activity:
As tolerated
Follow-Up Instructions/Appts:
Please follow up with your PCP in 1 week.
 
Medications at Discharge
Discharge Medications:
Stop taking the following medications:
Cilostazol (Cilostazol) 50 MG TABLET ORAL TWICE DAILY 
 
Clonidine (Catapres) 0.2 MG TABLET ORAL TWICE DAILY 
 
Atorvastatin Calcium (Lipitor) 20 MG TABLET ORAL DAILY 
 
Isosorbide Mononitrate (Isosorbide Mononitrate ER) 60 MG TAB.ER.24H ORAL DAILY 
 
Aspirin (Children's Aspirin) 81 MG TAB.CHEW ORAL DAILY 
 
Omeprazole (Omeprazole) 40 MG CAPSULE.DR ORAL DAILY Qty = 30
 
Amlodipine Besylate (Norvasc) 10 MG TABLET ORAL DAILY Days = 30
 
Metoprolol Succinate (Metoprolol Succinate) 50 MG TAB.ER.24H ORAL DAILY Days = 
30
 
Hydralazine HCl (Hydralazine HCl) 50 MG TABLET ORAL THREE TIMES DAILY Qty = 90
 
Cholecalciferol (Vitamin D3) (Vitamin D-3) 2,000 UNIT CAPSULE ORAL DAILY Qty = 1
 
Continue taking these medications:
Cholecalciferol (Vitamin D3) (Vitamin D3) 2,000 UNIT CAPSULE
    1 Capsule ORAL TWICE DAILY
    Comments:
       PER PT 
 
Omega-3 Fatty Acids/Fish Oil (Fish Oil 1,000 MG Capsule) 340 MG-1,000 MG CAPSULE
    2 Capsule ORAL DAILY
    Comments:
       NOT GIVEN IN HOSPITAL
 
Polyethylene Glycol 3350 (Miralax) 17 GRAM/DOSE POWDER
    17 Gram ORAL TWICE DAILY
    Qty = 30
 
Ondansetron HCl (Zofran) 4 MG TABLET
    1 Tablet ORAL THREE TIMES DAILY
    Qty = 30
 
Guaifenesin/Dextromethorphan (Robitussin Cough-Chest Dm Liq) 100 MG-5 MG/5 ML 
LIQUID
    10 Milliliters ORAL Every 4 hours as needed for COUGH
    Qty = 1
 
Albuterol Sulfate (Albuterol Sulfate) 0.63 MG/3 ML VIAL.NEB
    1 Vial Inhale Solution 4 TIMES A DAY as needed for COPD
    Qty = 1
 
Clonidine HCl (Clonidine HCl) 0.3 MG TABLET
    1 Tablet ORAL EVERY 12 HOURS
    Qty = 30
 
Amlodipine Besylate (Amlodipine Besylate) 10 MG TABLET
    1 Tablet ORAL DAILY
    Qty = 30
 
Isosorbide Mononitrate (Isosorbide Mononitrate ER) 60 MG TAB.ER.24H
    1 Tablet ORAL DAILY
    Qty = 30
    Instructions:
       HOLD FOR SBP<100MMGH
 
Omeprazole (Omeprazole) 40 MG CAPSULE.DR
    1 Capsule ORAL DAILY
    Qty = 30
 
Metoprolol Tartrate (Lopressor) 50 MG TABLET
    1 Tablet ORAL DAILY
    Qty = 30
 
Aspirin (Aspirin*) 81 MG TAB.CHEW
    1 Tablet ORAL DAILY
    Qty = 30
 
Hydralazine HCl (Hydralazine HCl) 25 MG TABLET
    3 Tablet ORAL THREE TIMES DAILY
    Qty = 30
 
Oxycodone HCl (Oxycodone HCl) 5 MG TABLET
    1 Tablet ORAL EVERY 8 HOURS AS NEEDED
    Qty = 30
 
Tramadol HCl (Tramadol HCl) 50 MG TABLET
    1 Tablet ORAL DAILY
    Qty = 30
 
 
Copies To:
Jairon PINTO,Adalberto CARLSON

## 2018-03-13 NOTE — PATIENT DISCHARGE INSTRUCTIONS
Discharge Instructions
 
General Discharge Information
You were seen/treated for:
Ascites
Liver masses
UTI
Special Instructions:
Follow-up with Dr. Jolly within 2 weeks for the results of your biopsy.
 
Diet
Recommended Diet: Heart Healthy
 
Acute Coronary Syndrome
 
Inclusion Criteria
At DC or during hospital stay patient has or had the following:
ACS DIAGNOSIS No
 
Discharge Core Measures
Meds if any: Prescribed or Continued at Discharge
Meds if any: NOT Prescribed or Continued at Discharge
 
Congestive Heart Failure
 
Inclusion Criteria
At DC or during hospital stay patient has or had the following:
CHF DIAGNOSIS No
 
Discharge Core Measures
Meds if any: Prescribed or Continued at Discharge
Meds if any: NOT Prescribed or Continued at Discharge
 
Cerebrovascular accident
 
Inclusion Criteria
At DC or during hospital stay patient has or had the following:
CVA/TIA Diagnosis No
 
Discharge Core Measures
Meds if any: Prescribed or Continued at Discharge
Meds if any: NOT Prescribed or Continued at Discharge
 
Venous thromboembolism
 
Inclusion Criteria
VTE Diagnosis No
VTE Type NONE
VTE Confirmed by (Test) NONE
 
Discharge Core Measures
- Per Current guidelines, there needs to be overlap
- treatment for the first 5 days of Warfarin therapy.
- If discharged on Warfarin prior to 5 days of
- overlap therapy, the patient will need to be
- assessed for post discharge needs including
- *Post discharge parental anticoagulation
- *Warfarin and/or parental anticoagulation education
- *Follow up date to check INR post discharge
At least 5 days overlap therapy as Inpatient No
Meds if any: Prescribed or Continued at Discharge
Note: Overlap Therapy is Warfarin and Anticoagulant
Meds if any: NOT Prescribed or Continued at Discharge

## 2018-03-13 NOTE — PN- HOUSESTAFF
Pretty PINTO,Theodore 18 0649:
Subjective
Follow-up For:
RAHUL on CKD
Ascites, with liver masses suspected HCC or liver mets
anemia
Subjective:
Patient was seen and examined at bedside.  She is resting comfortably.  She had 
no acute events overnight.  She tolerated her paracentesis and ultrasound-guided
biopsy of the liver yesterday.  She is complaining of generalized malaise and 
fatigue today but no worse than previously.  She tolerated complaining of pain 
from her ulcer.  She denies any chest pain, shortness of breath, nausea, 
vomiting, fever, chills.
 
Review of Systems
Constitutional:
Reports: malaise.  Denies: chills, fever. 
EENTM:
Reports: no symptoms. 
Cardiovascular:
Reports: no symptoms. 
Respiratory:
Reports: no symptoms. 
Gastrointestinal:
Reports: no symptoms. 
Genitourinary:
Reports: no symptoms. 
Musculoskeletal:
Reports: no symptoms. 
Skin:
Reports: no symptoms. 
 
Objective
Last 24 Hrs of Vital Signs/I&O
 Vital Signs
 
 
Date Time Temp Pulse Resp B/P B/P Pulse O2 O2 Flow FiO2
 
     Mean Ox Delivery Rate 
 
 0000      99 Nasal 2.0L 
 
       Cannula  
 
 2304 98.1 64 20 143/63  100 Nasal  
 
       Cannula  
 
 2154  64  143/63     
 
 2153  64  143/63     
 
 1800 98.4 76 18 128/70  95 Nasal 2.0L 
 
       Cannula  
 
 1730  76  128/70     
 
 1730  76  128/70     
 
 1730  76  128/70     
 
 1730  76  128/70     
 
 1706      97 Nasal 2.0L 
 
       Cannula  
 
 1700  76  128/70     
 
 
 Intake & Output
 
 
  0800  0000  1600
 
Intake Total 900 405 
 
Output Total   
 
Balance 900 405 
 
    
 
Intake,  225 
 
Intake, Oral 300 180 
 
 
 
 
Physical Exam
General Appearance: Alert, Oriented X3, Cooperative, No Acute Distress
Skin Temp/Moisture Exam: Warm/Dry
Cardiovascular: Regular Rate, Normal S1, Normal S2, systolic murmur
Lungs: Clear to Auscultation, Normal Air Movement
Abdomen: Normal Bowel Sounds, Soft, No Tenderness, IMPROVED DISTENSION
Neurological: Normal Speech
Extremities: IMPROVED EDEMA OF R HAND AND LE BILATERALLY
Current Medications:
 Current Medications
 
 
  Sig/Jose E Start time  Last
 
Medication Dose Route Stop Time Status Admin
 
Albumin Human 62.5 GM ONCE ONE  1700 DC 
 
  IV  1701  1911
 
Albumin Human 64 GM ONCE ONE  1600 CAN 
 
  IV  1601  
 
Albumin Human 25 GM TID  2200 DC 
 
  IV   2128
 
Albuterol Sulfate 3 ML Q4P PRN 03/10 2145 AC 
 
  INH   
 
Albuterol Sulfate 3 ML Q4H PRN  0200 AC 
 
  INH   
 
Amlodipine Besylate 10 MG DAILY  1000 AC 
 
  PO   1730
 
Ceftriaxone Sodium 1,000 MG Q24H  1600 AC 
 
  IV   1908
 
Cholecalciferol 1,000 IU BID  1000 AC 
 
  PO   1909
 
Clonidine 0.3 MG Q12  1000 AC 
 
  PO   2154
 
Dextrose/Sodium  1,000 ML Q13H  1030 AC 
 
Chloride  IV   1909
 
Docusate Sodium 100 MG BID  2341 AC 
 
  PO   0109
 
Fentanyl Citrate 0 .STK-MED ONE  1240 DC 
 
  .ROUTE   
 
Gelatin 1 UNIT .STK-MED ONE  1332 DC 
 
  TOP  1333  
 
Glycerin 2 SPRAY Q2P PRN  1915 AC 
 
  PO   0457
 
Guaifenesin 10 ML Q6P PRN  0200 AC 
 
  PO   
 
Heparin Sodium  5,000 UNIT Q8  2200 AC 
 
(Porcine)  SC   1730
 
Hydralazine HCl 100 MG BID  2200 AC 
 
  PO   2153
 
Isosorbide  60 MG DAILY  1000 AC 
 
Mononitrate  PO   1730
 
Lidocaine 1 ML .STK-MED ONE  1332 DC 
 
  ID  1333  
 
Metoprolol Tartrate 50 MG DAILY  1000 AC 
 
  PO   1700
 
Midazolam HCl 0 .STK-MED ONE  1240 DC 
 
  .ROUTE   
 
Omeprazole 40 MG DAILY AC  0700 AC 
 
  PO   0540
 
Ondansetron HCl 4 MG Q6P PRN 03/10 1045 AC 03/10
 
  IV   1600
 
Oxycodone HCl 5 MG Q8P PRN / 0200 AC 
 
  PO   2224
 
Polyethylene Glycol 17 GM BID  1000 AC 
 
  PO   1730
 
Senna 187 MG AT BEDTIME  2345 AC 
 
  PO   0109
 
Tramadol HCl 50 MG DAILY  1000 AC 
 
  PO   1730
 
 
 
 
Last 24 Hrs of Lab/Lio Results
Last 24 Hrs of Labs/Mics:
 Laboratory Tests
 
18 0925:
Anion Gap 10, Estimated GFR 44  L, BUN/Creatinine Ratio 35.8  H, PT 12.9  H, INR
1.18, CBC w Diff MAN DIFF ORDERED, RBC 3.56  L, MCV 89.4, MCH 29.9, MCHC 33.4, 
RDW 14.6  H, MPV 7.5, Gran % 89.4  H, Lymphocytes % 5.2  L, Monocytes % 5.0, 
Eosinophils % 0.1, Basophils % 0.3, Absolute Granulocytes 11.0  H, Absolute 
Lymphocytes 0.6  L, Absolute Monocytes 0.6, Absolute Eosinophils 0, Absolute 
Basophils 0, Platelet Estimate ADEQUATE, Poikilocytosis 1+, Anisocytosis 1+
 
 
Assessment/Plan
Assessment:
Patient is a 77-year-old female with a PMH significant for CKD stage IV, CAD 
status post CABG, PVD status post angioplasty with stent, HTN, uterine and colon
cancer, liver lesions either primary or metastatic cancer, who presented to 
[facility] from rehabilitation due to worsening  poor by mouth intake, 
nausea and vomiting, worsening ascites and worsening renal function.  
 
#RAHUL resolved
Likely prerenal azotemia, RAHUL has resolved, creatinine is back to baseline
-DC albumin
-Continue to monitor BEP
- post void bladder scan read 39 ml
- anticipated DC tomorrow, patient can follow-up as outpatient for biopsy 
results
 
#anemia
H/H has been stable
- follow up stool guaiac, patient has been constipated so no sample has been 
given
 
#UTI
Urine culture positive for Klebsiella pneumonia
-DC ceftriaxone, patient completed 5 day course
 
#Worsening ascites
Patient with a large volume tap with bladder biopsy yesterday.  She tolerated 
the procedure well.  Albumin was replaced
-DC albumin
-Follow-up liver biopsy results
 
#chronic pressure ulcer
Wound care consult was placed.  Recommendations appreciated
-Treat with DuoDERM and offloading
 
#Protein calorie malnutrition
Nutrition consult was placed.
-Will follow up nutrition recommendations, for now patient is on sodium 
restricted diet
 
#Fatigue
Patient was questioned about her hypersomnolence, fatigue and perceives 
depressed mood.  She stated that she has a lack of energy but does not feel that
she is an anhedonic.  She will consider allowing psych to come see her however 
she is not willing at this time.
 
Diet: Heary healthy diet
DVT prophylaxis:SC heparin, ALPS
Code status: full code
Problem List:
 1. Acute gastroenteritis
 
 2. UTI (urinary tract infection)
 
Pain Ratin
Pain Location:
none
Pain Goal: Remain pain free
Pain Plan:
pain pathway
Tomorrow's Labs & Rationales:
cbc, bep
 
 
Conrado PINTOYoanatoñito 18 1253:
Attending MD Review Statement
 
Attending Statement
Attending MD Statement: examined this patient, discuss w/resident/PA/NP, agreed 
w/resident/PA/NP, reviewed EMR data (avail)
Attending Assessment/Plan:
77F PMH PVD s/p angioplasty with stent, HTN, CAD s/p CABG, CKD stage 4, uterine 
and colon cancer admitted with intractable nausea and vomiting secondary to 
acute gastroenteritis leading to pre-renal azotemia/RAHUL, with recently found 
multiple liver masses suspicious for metastases, underwent paracentesis and 
liver mass biopsy on 3/12 without complication.
 
Slowly improving.  Feels fatigued and weak.
 
1. Pre-renal azotemia/RAHUL secondary to dehydration
2. Acute gastroenteritis
3. Liver metastases
4. Hypokalemia
5. Hyponatremia (resolved)
6. 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
 
Plan
- Continue on general medicine
- Psychiatry consult for depression
- Monitor renal function 
- Follow GI and nephrology recommendations
- PT evaluation for discharge planning
- DVT PPx

## 2018-03-14 VITALS — DIASTOLIC BLOOD PRESSURE: 64 MMHG | SYSTOLIC BLOOD PRESSURE: 144 MMHG

## 2018-03-14 VITALS — DIASTOLIC BLOOD PRESSURE: 76 MMHG | SYSTOLIC BLOOD PRESSURE: 148 MMHG

## 2018-03-14 VITALS — DIASTOLIC BLOOD PRESSURE: 60 MMHG | SYSTOLIC BLOOD PRESSURE: 104 MMHG

## 2018-03-14 LAB
ABSOLUTE GRANULOCYTE CT: 10.9 /CUMM (ref 1.4–6.5)
BASOPHILS # BLD: 0 /CUMM (ref 0–0.2)
BASOPHILS NFR BLD: 0.1 % (ref 0–2)
EOSINOPHIL # BLD: 0 /CUMM (ref 0–0.7)
EOSINOPHIL NFR BLD: 0.1 % (ref 0–5)
ERYTHROCYTE [DISTWIDTH] IN BLOOD BY AUTOMATED COUNT: 14.3 % (ref 11.5–14.5)
GRANULOCYTES NFR BLD: 90.8 % (ref 42.2–75.2)
HCT VFR BLD CALC: 29.8 % (ref 37–47)
LYMPHOCYTES # BLD: 0.7 /CUMM (ref 1.2–3.4)
MCH RBC QN AUTO: 29.9 PG (ref 27–31)
MCHC RBC AUTO-ENTMCNC: 34.2 G/DL (ref 33–37)
MCV RBC AUTO: 87.4 FL (ref 81–99)
MONOCYTES # BLD: 0.4 /CUMM (ref 0.1–0.6)
PLATELET # BLD: 69 /CUMM (ref 130–400)
PMV BLD AUTO: 8.8 FL (ref 7.4–10.4)
RED BLOOD CELL CT: 3.41 /CUMM (ref 4.2–5.4)
WBC # BLD AUTO: 12 /CUMM (ref 4.8–10.8)

## 2018-03-14 NOTE — PN- HOUSESTAFF
Pretty PINTO,Theodore 18 0706:
Subjective
Follow-up For:
liver masses suspected HCC or liver mets
anemia
Subjective:
Patient was seen and examined at bedside.  She was resting comfortably.  She had
no acute events overnight.  Her nausea has returned and she is complaining of 
generalized malaise.  She continues to refuse medications at times and has not 
been eating or drinking well.  Today when asked about her mood she reported that
she is feeling down.  
 
Review of Systems
Constitutional:
Reports: malaise. 
EENTM:
Reports: no symptoms. 
Cardiovascular:
Reports: no symptoms. 
Respiratory:
Reports: no symptoms. 
Gastrointestinal:
Reports: nausea. 
Genitourinary:
Reports: no symptoms. 
Musculoskeletal:
Reports: no symptoms. 
Skin:
Reports: no symptoms. 
 
Objective
Last 24 Hrs of Vital Signs/I&O
 Vital Signs
 
 
Date Time Temp Pulse Resp B/P B/P Pulse O2 O2 Flow FiO2
 
     Mean Ox Delivery Rate 
 
 0653 98.5 63 20 144/64  95 Nasal 2.0L 
 
       Cannula  
 
 2240 97.8 67  153/72  98 Nasal  
 
       Cannula  
 
 2220  68  150/70     
 
 2219  68  150/70     
 
 1552       Nasal 1.0L 
 
       Cannula  
 
 1501 97.7 63 20 128/56  95   
 
 1022  68  150/60     
 
 1021  68  150/60     
 
 1021  68  150/60     
 
 1020  68  150/60     
 
 1020  68  150/62     
 
 0937      94 Nasal 2.0L 
 
       Cannula  
 
 0800      98 Nasal 1.0L 
 
       Cannula  
 
 
 Intake & Output
 
 
  0800  0000  1600
 
Intake Total  1000 400
 
Output Total   
 
Balance  1000 400
 
    
 
Intake, IV  600 
 
Intake, Oral  400 400
 
 
 
 
Physical Exam
General Appearance: Alert, Oriented X3, Cooperative, No Acute Distress, 
cachectic
Skin Temp/Moisture Exam: Warm/Dry
Cardiovascular: Regular Rate, Normal S1, Normal S2
Lungs: Clear to Auscultation, Normal Air Movement
Abdomen: improved distension after paracentesis; difuse, mild TTP
Neurological: Normal Speech, Sensation Intact
Extremities: No Clubbing, No Cyanosis, No Edema
Current Medications:
 Current Medications
 
 
  Sig/Jose E Start time  Last
 
Medication Dose Route Stop Time Status Admin
 
Albuterol Sulfate 3 ML Q4P PRN 03/10 2145 DC 
 
  INH   
 
Albuterol Sulfate 3 ML Q4H PRN  0200 AC 
 
  INH   
 
Amlodipine Besylate 10 MG DAILY  1000 AC 
 
  PO   1020
 
Ceftriaxone Sodium 1,000 MG Q24H  1600 DC 
 
  IV   1548
 
Cholecalciferol 1,000 IU BID  1000 AC 
 
  PO   1019
 
Clonidine 0.3 MG Q12  1000 AC 
 
  PO   2219
 
Dextrose/Sodium  1,000 ML Q13H  1030 AC 
 
Chloride  IV   0520
 
Docusate Sodium 100 MG BID  2341 AC 
 
  PO   1021
 
Glycerin 2 SPRAY Q2P PRN  1915 AC 
 
  PO   0457
 
Guaifenesin 10 ML Q6P PRN  0200 AC 
 
  PO   
 
Heparin Sodium  5,000 UNIT Q8  2200 AC 
 
(Porcine)  SC   1730
 
Hydralazine HCl 100 MG BID  2200 AC 
 
  PO   2220
 
Isosorbide  60 MG DAILY  1000 AC 
 
Mononitrate  PO   1022
 
Melatonin 3 MG AT BEDTIME  2200 AC 
 
  PO   2221
 
Metoprolol Tartrate 50 MG DAILY  1000 AC 
 
  PO   1021
 
Omeprazole 40 MG DAILY AC  0700 AC 
 
  PO   0540
 
Ondansetron HCl 4 MG Q6P PRN 03/10 1045 AC 03/10
 
  IV   1600
 
Oxycodone HCl 5 MG Q8P PRN  0615 AC 
 
  PO   
 
Oxycodone HCl 5 MG Q8P PRN  0200 DC 
 
  PO   2228
 
Polyethylene Glycol 17 GM BID  1000 AC 
 
  PO   1022
 
Senna 187 MG AT BEDTIME  2345 AC 
 
  PO   0109
 
Tramadol HCl 50 MG DAILY  1000 AC 
 
  PO   1021
 
 
 
 
Last 24 Hrs of Lab/Lio Results
Last 24 Hrs of Labs/Mics:
 Laboratory Tests
 
18 0852:
Anion Gap 7, Estimated GFR 54  L, BUN/Creatinine Ratio 38.0  H, Total Bilirubin 
0.7, Direct Bilirubin 0.7  H, AST 13  L, ALT 23, Alkaline Phosphatase 169  H, 
Total Protein 4.2  L, Albumin 2.3  L, CBC w Diff NO MAN DIFF REQ, RBC 3.54  L, 
MCV 87.5, MCH 29.8, MCHC 34.1, RDW 14.1, MPV 7.9, Gran % 90.7  H, Lymphocytes % 
5.1  L, Monocytes % 4.1, Eosinophils % 0.1, Basophils % 0, Absolute Granulocytes
9.7  H, Absolute Lymphocytes 0.5  L, Absolute Monocytes 0.4, Absolute 
Eosinophils 0, Absolute Basophils 0
 
 
Assessment/Plan
Assessment:
Patient is a 77-year-old female with a PMH significant for CKD stage IV, CAD 
status post CABG, PVD status post angioplasty with stent, HTN, uterine and colon
cancer, liver lesions either primary or metastatic cancer, who presented to 
[facility] from rehabilitation due to worsening  poor by mouth intake, 
nausea and vomiting, worsening ascites and worsening renal function.  
 
#RAHUL resolved
Likely prerenal azotemia, RAHUL has resolved, creatinine is back to baseline
-Continue to monitor BEP
- post void bladder scan read 39 ml
 
#anemia
H/H has been stable
- follow up stool guaiac, patient has been constipated so no sample has been 
given
 
#UTI
Urine culture positive for Klebsiella pneumonia
-continue to watch off of antibiotics
 
#Worsening ascites
Patient with a large volume tap with bladder biopsy 3/12/18.  She tolerated the 
procedure well.  Albumin was replaced
-Follow-up liver biopsy results
 
#chronic pressure ulcer
Wound care consult was placed.  Recommendations appreciated
-Treat with DuoDERM and offloading
 
#Protein calorie malnutrition
Nutrition consult was placed.
-Will follow up nutrition recommendations, for now patient is on sodium 
restricted diet
 
#Fatigue and possible depresion
Patient was questioned about her hypersomnolence, fatigue and perceives 
depressed mood.  She stated that she has a lack of energy but does not feel that
she is an anhedonic.  
-patient consented to a psych eval today.
 -will follow up recommendations
 
Diet: Heary healthy diet
DVT prophylaxis:SC heparin, ALPS
Code status: full code
Problem List:
 1. Pressure ulcer
 
 2. Mass of multiple sites of liver
 
Pain Ratin
Pain Location:
none
Pain Goal: Remain pain free
Pain Plan:
pain pathway
Tomorrow's Labs & Rationales:
cbc, bep
 
 
James Camp MD 18 1359:
Attending MD Review Statement
 
Attending Statement
Attending MD Statement: examined this patient, discuss w/resident/PA/NP, agreed 
w/resident/PA/NP, reviewed EMR data (avail)
Attending Assessment/Plan:
77F PMH PVD s/p angioplasty with stent, HTN, CAD s/p CABG, CKD stage 4, uterine 
and colon cancer admitted with intractable nausea and vomiting secondary to 
acute gastroenteritis leading to pre-renal azotemia/RAHUL, with recently found 
multiple liver masses suspicious for metastases, underwent paracentesis and 
liver mass biopsy on 3/12 without complication.
 
Slowly improving.  Feels fatigued and weak.
 
1. Pre-renal azotemia/RAHUL secondary to dehydration
2. Acute gastroenteritis
3. Liver metastases
4. Hypokalemia
5. Hyponatremia (resolved)
6. 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
 
Plan
- Continue on general medicine
- Psychiatry consult for depression
- Monitor renal function 
- Follow GI and nephrology recommendations
- PT evaluation for discharge planning
- DVT PPx

## 2018-03-15 VITALS — DIASTOLIC BLOOD PRESSURE: 60 MMHG | SYSTOLIC BLOOD PRESSURE: 152 MMHG

## 2018-03-15 VITALS — DIASTOLIC BLOOD PRESSURE: 60 MMHG | SYSTOLIC BLOOD PRESSURE: 174 MMHG

## 2018-03-15 VITALS — SYSTOLIC BLOOD PRESSURE: 150 MMHG | DIASTOLIC BLOOD PRESSURE: 75 MMHG

## 2018-03-15 VITALS — SYSTOLIC BLOOD PRESSURE: 183 MMHG | DIASTOLIC BLOOD PRESSURE: 88 MMHG

## 2018-03-15 LAB
ABSOLUTE GRANULOCYTE CT: 10.6 /CUMM (ref 1.4–6.5)
BASOPHILS # BLD: 0 /CUMM (ref 0–0.2)
BASOPHILS NFR BLD: 0 % (ref 0–2)
EOSINOPHIL # BLD: 0 /CUMM (ref 0–0.7)
EOSINOPHIL NFR BLD: 0.1 % (ref 0–5)
ERYTHROCYTE [DISTWIDTH] IN BLOOD BY AUTOMATED COUNT: 14.8 % (ref 11.5–14.5)
GRANULOCYTES NFR BLD: 88.4 % (ref 42.2–75.2)
HCT VFR BLD CALC: 31.5 % (ref 37–47)
LYMPHOCYTES # BLD: 0.8 /CUMM (ref 1.2–3.4)
MCH RBC QN AUTO: 30.2 PG (ref 27–31)
MCHC RBC AUTO-ENTMCNC: 34.3 G/DL (ref 33–37)
MCV RBC AUTO: 88.1 FL (ref 81–99)
MONOCYTES # BLD: 0.5 /CUMM (ref 0.1–0.6)
PLATELET # BLD: 83 /CUMM (ref 130–400)
PMV BLD AUTO: 8.4 FL (ref 7.4–10.4)
RED BLOOD CELL CT: 3.58 /CUMM (ref 4.2–5.4)
WBC # BLD AUTO: 12 /CUMM (ref 4.8–10.8)

## 2018-03-15 NOTE — PN- HOUSESTAFF
Pretty PINTO,Theodore 03/15/18 0723:
Subjective
Follow-up For:
liver masses suspected HCC or liver mets
anemia
depression
Subjective:
Patient was seen and examined at bedside.  She was resting comfortably.  She had
no acute events overnight.  She continues to have a depressed mood, but is 
willing to talk to psychiatrist today.  She is continuing to refuse some 
medications and physical therapy.  She is nauseous and is requesting something 
to improve her appetite.  She has no other complaints.
 
Review of Systems
Constitutional:
Reports: no symptoms. 
EENTM:
Reports: no symptoms. 
Cardiovascular:
Reports: no symptoms. 
Respiratory:
Reports: no symptoms. 
Gastrointestinal:
Denies: nausea, vomiting. 
Genitourinary:
Reports: no symptoms. 
Musculoskeletal:
Reports: no symptoms. 
Skin:
Reports: no symptoms. 
 
Objective
Last 24 Hrs of Vital Signs/I&O
 Vital Signs
 
 
Date Time Temp Pulse Resp B/P B/P Pulse O2 O2 Flow FiO2
 
     Mean Ox Delivery Rate 
 
03/15 0700 97.8 66 20 152/60  93 Nasal 1.0L 
 
       Cannula  
 
03/15 0000      96 Nasal 1.0L 
 
       Cannula  
 
 2244 98.1 68 20 104/60  96 Nasal  
 
       Cannula  
 
 2102  68  148/76     
 
 2102  68  148/76     
 
 1509 97.5 68 18 148/76  96   
 
 1212 98.5 63 20 144/64     
 
 1212 98.5 63 20 144/64     
 
 1212 98.5 63 20 144/64     
 
 1212 98.5 63  144/64     
 
 1211 98.5 63 20 144/64     
 
 0800       Nasal 1.0L 
 
       Cannula  
 
 
 Intake & Output
 
 
 03/15 0800 03/15 0000  1600
 
Intake Total 
 
Output Total   
 
Balance 
 
    
 
Intake,   700
 
Intake, Oral 120 240 420
 
 
 
 
Physical Exam
General Appearance: Alert, Oriented X3, Cooperative, No Acute Distress
Skin: pressure ulcer on the sacrum is decreased in size, approximately 0.3 x 0.3
with signs of new skin growth on the periphery
Skin Temp/Moisture Exam: Warm/Dry
Cardiovascular: Regular Rate, Normal S1, Normal S2, systolic murmur
Lungs: Clear to Auscultation, Normal Air Movement
Abdomen: distension resolved, diffuse mild TTP
Neurological: Normal Speech, Sensation Intact
Extremities: dependent edema of the R hand and R foot
Current Medications:
 Current Medications
 
 
  Sig/Jose E Start time  Last
 
Medication Dose Route Stop Time Status Admin
 
Albuterol Sulfate 3 ML Q4H PRN  0200 AC 
 
  INH   
 
Amlodipine Besylate 10 MG DAILY  1000 AC 
 
  PO   1212
 
Cholecalciferol 1,000 IU BID  1000 AC 
 
  PO   2102
 
Clonidine 0.3 MG Q12  1000 AC 
 
  PO   2102
 
Dextrose/Sodium  1,000 ML ONCE ONE  191 AC 
 
Chloride  IV 03/15 0834  1947
 
Dextrose/Sodium  1,000 ML Q13H  1030 DC 
 
Chloride  IV   0520
 
Docusate Sodium 100 MG BID  2341 AC 
 
  PO   1021
 
Glycerin 2 SPRAY Q2P PRN  191 AC 
 
  PO   0457
 
Guaifenesin 10 ML Q6P PRN  0200 AC 
 
  PO   
 
Heparin Sodium  5,000 UNIT Q8  2200 AC 
 
(Porcine)  SC   1730
 
Hydralazine HCl 100 MG BID  2200 AC 
 
  PO   2102
 
Isosorbide  60 MG DAILY  1000 AC 
 
Mononitrate  PO   1212
 
Melatonin 3 MG AT BEDTIME  2200 AC 
 
  PO   2102
 
Metoprolol Tartrate 50 MG DAILY  1000 AC 
 
  PO   1212
 
Omeprazole 40 MG DAILY AC  0700 AC 03/15
 
  PO   0622
 
Ondansetron HCl 4 MG .STK-MED ONE 1949 DC 
 
  IM  1950  
 
Ondansetron HCl 4 MG Q6P PRN 03/10 1045 AC 
 
  IV   1949
 
Oxycodone HCl 5 MG Q8P PRN  0615 AC 
 
  PO   2102
 
Polyethylene Glycol 17 GM BID  1000 AC 
 
  PO   1022
 
Potassium Chloride 40 MEQ ONCE ONE  191 DC 
 
  PO  191  1949
 
Senna 187 MG AT BEDTIME  2345 AC 
 
  PO   0109
 
Tramadol HCl 50 MG DAILY  1000 AC 
 
  PO   1215
 
 
 
 
Last 24 Hrs of Lab/Lio Results
Last 24 Hrs of Labs/Mics:
 Laboratory Tests
 
18 1445:
RBC 3.41  L, MCV 87.4, MCH 29.9, MCHC 34.2, RDW 14.3, MPV 8.8, Gran % 90.8  H, 
Lymphocytes % 5.4  L, Monocytes % 3.6, Eosinophils % 0.1, Basophils % 0.1, 
Absolute Granulocytes 10.9  H, Absolute Lymphocytes 0.7  L, Absolute Monocytes 
0.4, Absolute Eosinophils 0, Absolute Basophils 0
 
18 1215:
Anion Gap 10, Estimated GFR > 60, BUN/Creatinine Ratio 42.5  H
 
 
Assessment/Plan
Assessment:
Patient is a 77-year-old female with a PMH significant for CKD stage IV, CAD 
status post CABG, PVD status post angioplasty with stent, HTN, uterine and colon
cancer, liver lesions either primary or metastatic cancer, who presented to 
[facility] from rehabilitation due to worsening  poor by mouth intake, 
nausea and vomiting, worsening ascites and worsening renal function.  
 
#RAHUL resolved
Likely prerenal azotemia, RAHUL has resolved, creatinine is back to baseline
-Continue to monitor BEP
- post void bladder scan read 39 ml
 
#anemia
H/H has been stable
- follow up stool guaiac, patient has been constipated so no sample has been 
given
 
#Worsening ascites
Patient with a large volume tap with liver biopsy 3/12/18.  She tolerated the 
procedure well.  Albumin was replaced
-Follow-up liver biopsy results
 
#chronic pressure ulcer
This appears improved today, ulceration is still present with smaller in size 
with signs of new skin growth around the periphery
-Treat with DuoDERM and offloading
 
#Mild hyponatremia
Patient started on maintenance IV normal saline
 
#mild Protein calorie malnutrition
Nutrition consult was placed.
-Will follow up nutrition recommendations, for now patient is on sodium 
restricted diet
-We'll start Remeron which will both help with sleep and possibly stimulate 
appetite.
 
#Fatigue and possible depresion
Psychiatry list the patient is significantly depressed, with patient's hepatic 
pathology and CKD medicating will be difficult.
-Start Lexapro 5 mg daily, will titrate up the possible
-Encourage out of bed with physical therapy, however patient has been refusing 
treatment of this at this time
 
Diet: Heary healthy diet
DVT prophylaxis:SC heparin, ALPS
Code status: full code
Problem List:
 1. Pressure ulcer
 
Pain Ratin
Pain Location:
none
Pain Goal: Remain pain free
Pain Plan:
none
Tomorrow's Labs & Rationales:
cbc, bep
 
 
Conrado PINTO,James 03/15/18 1108:
Attending MD Review Statement
 
Attending Statement
Attending MD Statement: examined this patient, discuss w/resident/PA/NP, agreed 
w/resident/PA/NP, reviewed EMR data (avail)
Attending Assessment/Plan:
77F PMH PVD s/p angioplasty with stent, HTN, CAD s/p CABG, CKD stage 4, uterine 
and colon cancer admitted with intractable nausea and vomiting secondary to 
acute gastroenteritis leading to pre-renal azotemia/RAHUL, with recently found 
multiple liver masses suspicious for metastases, underwent paracentesis and 
liver mass biopsy on 3/12 without complication.
 
Slowly improving.  Feels fatigued and weak.
 
1. Pre-renal azotemia/RAHUL secondary to dehydration
2. Acute gastroenteritis
3. Liver metastases
4. Hypokalemia
5. Hyponatremia (resolved)
6. 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
 
Plan
- Continue on general medicine
- Psychiatry consult for depression
- Monitor renal function 
- Follow GI and nephrology recommendations
- PT evaluation for discharge planning
- DVT PPx

## 2018-03-15 NOTE — INCDNTL NT PSY
Incidental Note
Notation:
Would leave her at 5 mg and have psych/PCP reassess her in 6 weeks to possibly 
raise dose at their discretion depending on her clinical status. JASBIR

## 2018-03-15 NOTE — CONS- PSYCHIATRY
Psychiatric Consult
Date of Consult: 03/15/18
Allergies:
Coded Allergies:
Sulfa (Sulfonamide Antibiotics) (Severe, HIVES 16)
 
 
Past History
 
Past Medical History
Neurological: CVA (? L CVA w/o residual)
EENT: NONE
Cardiovascular: CAD, hypertension, hyperlipidemia, PVD
Respiratory: NONE
Gastrointestinal: colon cancer
Hepatic: NONE
Renal: chronic kidney disease (stage IV)
Musculoskeletal: chronic back pain, degen joint disease, falls, gout
Psychiatric: NONE
Endocrine: thyroid disease
Blood Disorders: anemia
Cancer(s): basal cell carcinoma, colon/rectal cancer, endometrial cancer
GYN/Reproductive: NONE
 
Past Surgical History
Surgical History: CABG, hysterectomy, PARTIAL SIGMOIDECTOMY BACK SURGERY 
CAROTIDECTOMY LE STENT PLACEMENT
 
Assessment/Plan
Impression:
Pt seen and examined bedside. She is a 77 her old female PMH CKD IV, CAD s/p 
Triple vessel CABG, colon cancer, PVD s/p carotid endarectomy, HTN, HPL, chronic
back pain who was BIBA from [facility] re: ascites, imapaired renal fxn, 
poor appetite. Recent CT scan from recent admit showed liver masses enlarged 
from previous scan concerning for malignancy. CEA positive. Also multiple renal 
cysts. Pt sent by Dr. Holland for evaluation. Recent WL 15 lbs. Won't get out 
of bed. Consult called for ?depression. Presently, pt is in bed eyes closed with
lights off. She does not want to talk and is irritable. Nurse comes to admin 
meds she refuses. She is minimally engaged with interview and wont answer most 
questions. Somatically preoccupied. Extreme nausea and pain causing her 
discomfort. She does endorse poor spirits 2/2 to "there seems to be no answer to
this mess." States she does take medications eventually. Gave psychoeducation 
about depression being an illness that could cause her to be down and irriatble 
wo motivation to do anything. Agrees. She is amenable to SSRI. Discussed R/B/
SEs. No SI HI AH VH. 
 
PPHx: No inpatient hospitalizations. No suicide attempts. No prior treatment 
although she states I dont know everything was so long ago. 
 
 Cant remember
 
SH States she is usually social and has several friends who support her. Was 
   30 years ago. She was in customer service for a tech 
company. Has 1 son, decent relationship.
 
MSE Elderly F in bed sleeping in dark room, cachectic, disheveled, irritable. 
Mood down Affect depressed TP linear TC aches and pains Speech clear Psychomotor
retardation I/J very somatic, not psychologically minded but amenable to 
treatment for her mood
 
 
 Current Medications
 
 
  Sig/Jose E Start time  Last
 
Medication Dose Route Stop Time Status Admin
 
Albuterol Sulfate 3 ML Q4H PRN  0200 AC 
 
  INH   
 
Alprazolam 0.5 MG .STK-MED ONE 03/15 0056 DC 
 
  PO 03/15 0057  
 
Amlodipine Besylate 10 MG DAILY  1000 AC 
 
  PO   1212
 
Cholecalciferol 1,000 IU BID  1000 AC 
 
  PO   2102
 
Clonidine 0.3 MG Q12  1000 AC 
 
  PO   210
 
Dextrose/Sodium  1,000 ML ONCE ONE 1915 DC 
 
Chloride  IV 03/15 0834  1947
 
Dextrose/Sodium  1,000 ML Q13H  1030 DC 
 
Chloride  IV   0520
 
Docusate Sodium 100 MG BID  2341 AC 
 
  PO   1021
 
Glycerin 2 SPRAY Q2P PRN  191 AC 
 
  PO   0457
 
Guaifenesin 10 ML Q6P PRN  0200 AC 
 
  PO   
 
Heparin Sodium  5,000 UNIT Q8  2200 AC 
 
(Porcine)  SC   1730
 
Hydralazine HCl 100 MG BID  2200 AC 
 
  PO   2102
 
Isosorbide  60 MG DAILY  1000 AC 
 
Mononitrate  PO   1212
 
Melatonin 3 MG AT BEDTIME  2200 AC 
 
  PO   210
 
Metoprolol Tartrate 50 MG DAILY  1000 AC 
 
  PO   1212
 
Omeprazole 40 MG DAILY AC  0700 AC 03/15
 
  PO   0622
 
Ondansetron HCl 4 MG .STK-MED ONE  194 DC 
 
  IM  1950  
 
Ondansetron HCl 4 MG Q6P PRN 03/10 1045 AC 
 
  IV   1949
 
Oxycodone HCl 5 MG Q8P PRN  0615 AC 
 
  PO   210
 
Polyethylene Glycol 17 GM BID  1000 AC 
 
  PO   1022
 
Potassium Chloride 40 MEQ ONCE ONE 1915 DC 
 
  PO  191  1949
 
Senna 187 MG AT BEDTIME  2345 AC 
 
  PO   0109
 
Tramadol HCl 50 MG DAILY  1000 AC 
 
  PO   1215
 
 
  
 
 Laboratory Tests
 
 
 03/15 03/14 03/14
 
 0933 6915 1215
 
Chemistry   
 
  Sodium (137 - 145 mmol/L) Pending  131  L
 
  Potassium (3.5 - 5.1 mmol/L) Pending  3.4  L
 
  Chloride (98 - 107 mmol/L) Pending  103
 
  Carbon Dioxide (22 - 30 mmol/L) Pending  18  L
 
  Anion Gap (5 - 16) Pending  10
 
  BUN (7 - 17 mg/dL) Pending  34  H
 
  Creatinine (0.5 - 1.0 mg/dL) Pending  0.8
 
  Estimated GFR (>60 ml/min)   > 60
 
  BUN/Creatinine Ratio (7 - 25 %) Pending  42.5  H
 
Hematology   
 
  CBC w Diff Pending  
 
  WBC (4.8 - 10.8 /CUMM) Pending 12.0  H 
 
  RBC (4.20 - 5.40 /CUMM) Pending 3.41  L 
 
  Hgb (12.0 - 16.0 G/DL) Pending 10.2  L 
 
  Hct (37 - 47 %) Pending 29.8  L 
 
  MCV (81.0 - 99.0 FL) Pending 87.4 
 
  MCH (27.0 - 31.0 PG) Pending 29.9 
 
  MCHC (33.0 - 37.0 G/DL) Pending 34.2 
 
  RDW (11.5 - 14.5 %) Pending 14.3 
 
  Plt Count (130 - 400 /CUMM) Pending 69  L 
 
  MPV (7.4 - 10.4 FL) Pending 8.8 
 
  Gran % (42.2 - 75.2 %)  90.8  H 
 
  Lymphocytes % (20.5 - 51.1 %)  5.4  L 
 
  Monocytes % (1.7 - 9.3 %)  3.6 
 
  Eosinophils % (0 - 5 %)  0.1 
 
  Basophils % (0.0 - 2.0 %)  0.1 
 
  Absolute Granulocytes (1.4 - 6.5 /CUMM)  10.9  H 
 
  Absolute Lymphocytes (1.2 - 3.4 /CUMM)  0.7  L 
 
  Absolute Monocytes (0.10 - 0.60 /CUMM)  0.4 
 
  Absolute Eosinophils (0.0 - 0.7 /CUMM)  0 
 
  Absolute Basophils (0.0 - 0.2 /CUMM)  0 
 
 
 
 
A/ 78 y/o F with MMP, probable malignancy in liver, ESRD who presents with 
depression in context of probable terminal illness. Exam limited by her 
irritable resistance but she is clear she is not herself and would like 
treatment. Counseled to take her meds reularly and states she will try. 
 
P/
-Pt probably not taking meds 2/2 irritability and extreme amotivation due to 
depression. States she will try to comply
-Metoprolol can occasionally cause depressive sx; at your discretion to change 
to alternative antihypertensive
-Avoid benzos and anticholinergics; could precipiate delirium in someone this 
fragile. Can use trazodone 12.5 mg prn anxiety/agitation if QTc WNL.
-Would start lexapro 5 mg qDaily with food; hepatically cleared but this is low 
dose; can raise to 10 mg after 2 days if she tolerates
-Vit D, folate, B12, lyme titer, iron, TSH
-Try to encourage her to get daylight
 
Thank you for this consult. Page #[phone] with any questions.
Ira MARTELL

## 2018-03-15 NOTE — PN- GASTROENTEROLOGY
Assessment/Plan GI
Assessment/Recommendations:
77 year old female admitted with worsening ascites and renal failure and liver 
lesions on ct scan suggestive of metastatic disease.  She underwent a diagnostic
paracentesis on March 8 which was negative for sbp by cell count and her SAAG 
was greater then 1.1 indicating the ascitic fluid is from portal hypertension 
and not necessarily from a malignancy; cytology was negative.  Her kidney 
function has improved with albumin infusions, and these have been stopped with 
continuing recovery.  Her hematocrit has remained stable.  She underwent a 
therapeutic paracentesis of almost 9 L on March 12, followed by a targeted liver
biopsy.
 
Liver biopsy results (poorly differentiated adenocarcinoma, further stains 
pending) have been discussed with the patient.  She has remote histories of 
uterine and colon cancer.
 
The patient is deconditioned, has poor appetite and oral intake, and depressed. 
Psychiatry has been consulted.
 
Recommendations:
* Oncology consult
* Physical therapy, psychiatry follow-up, consider appetite stimulant
* Ondansetron around the clock rather than as needed
* Spironolactone 50 mg, furosemide 20 mg daily.  Follow electrolytes and renal 
function
 
 
 
Subjective
Subjective:
The patient has poor appetite oral intake.  She complains of nausea but no 
vomiting.  There has been no abdominal pain.  She is depressed.  She is hardly 
getting out of bed.
 
Objective
Vital Signs and I&Os
Vital Signs
 
 
Date Time Temp Pulse Resp B/P B/P Pulse O2 O2 Flow FiO2
 
     Mean Ox Delivery Rate 
 
03/15 1638 97.9 64 18 150/75     
 
03/15 1403 97.9 64 18 150/75  94   
 
03/15 0700 97.8 66 20 152/60  93 Nasal 1.0L 
 
       Cannula  
 
03/15 0000      96 Nasal 1.0L 
 
       Cannula  
 
03/14 2244 98.1 68 20 104/60  96 Nasal  
 
       Cannula  
 
03/14 2102  68  148/76     
 
03/14 2102  68  148/76     
 
 
 Intake & Output
 
 
 03/15 1600 03/15 0400 03/14 1600 03/14 0400 03/13 1600 03/13 0400
 
Intake Total  1000 1300 405
 
Output Total      
 
Balance  1000 1300 405
 
       
 
Intake,   1300 600 600 225
 
Intake, Oral 120 240 620 400 700 180
 
Number   0   
 
Bowel      
 
Movements      
 
Patient     104 lb 
 
Weight      
 
 
 
Physical Exam:
Thin/cachectic.  Alert and oriented.  Sclera are anicteric.  No adenopathy.  
Abdomen is mildly distended, soft, nontender.  Extremities with edema of the 
right hand and right lower extremity.
Current Medications:
 Current Medications
 
 
  Sig/Jose E Start time  Last
 
Medication Dose Route Stop Time Status Admin
 
Albuterol Sulfate 3 ML Q4H PRN 03/07 0200 AC 
 
  INH   
 
Alprazolam 0.5 MG .STK-MED ONE 03/15 0056 DC 
 
  PO 03/15 0057  
 
Amlodipine Besylate 10 MG DAILY 03/07 1000 AC 03/15
 
  PO   1638
 
Cholecalciferol 1,000 IU BID 03/07 1000 AC 03/14
 
  PO   2102
 
Clonidine 0.3 MG Q12 03/07 1000 AC 03/14
 
  PO   2102
 
Dextrose/Sodium  1,000 ML ONCE ONE 03/14 1915 DC 03/14
 
Chloride  IV 03/15 0834  1947
 
Docusate Sodium 100 MG BID 03/12 2341 AC 03/13
 
  PO   1021
 
Escitalopram Oxalate 5 MG 0800 03/16 0800 AC 
 
  PO   
 
Glycerin 2 SPRAY Q2P PRN 03/11 1915 AC 03/12
 
  PO   0457
 
Guaifenesin 10 ML Q6P PRN 03/07 0200 AC 
 
  PO   
 
Heparin Sodium  5,000 UNIT Q8 03/07 2200 AC 03/12
 
(Porcine)  SC   1730
 
Hydralazine HCl 100 MG BID 03/09 2200 AC 03/14
 
  PO   2102
 
Isosorbide  60 MG DAILY 03/07 1000 AC 03/14
 
Mononitrate  PO   1212
 
Melatonin 3 MG AT BEDTIME 03/13 2200 DC 03/14
 
  PO   2102
 
Metoprolol Tartrate 50 MG DAILY 03/07 1000 AC 03/15
 
  PO   1638
 
Mirtazapine 7.5 MG AT BEDTIME 03/15 2200 AC 
 
  PO   
 
Omeprazole 40 MG DAILY AC 03/07 0700 AC 03/15
 
  PO   0622
 
Ondansetron HCl 4 MG Q6P PRN 03/10 1045 AC 03/14
 
  IV   1949
 
Oxycodone HCl 5 MG Q8P PRN 03/14 0615 AC 03/15
 
  PO   1634
 
Polyethylene Glycol 17 GM BID 03/07 1000 AC 03/13
 
  PO   1022
 
Senna 187 MG AT BEDTIME 03/12 2345 AC 03/13
 
  PO   0109
 
Tramadol HCl 50 MG DAILY 03/07 1000 AC 03/14
 
  PO   1215
 
 
 
 
Results
Pertinent Lab Results:
 Laboratory Tests
 
 
 03/15 03/14 03/14
 
 0933 1445 1215
 
Chemistry   
 
  Sodium (137 - 145 mmol/L) 134  L  131  L
 
  Potassium (3.5 - 5.1 mmol/L) 3.6  3.4  L
 
  Chloride (98 - 107 mmol/L) 108  H  103
 
  Carbon Dioxide (22 - 30 mmol/L) 19  L  18  L
 
  Anion Gap (5 - 16) 6  10
 
  BUN (7 - 17 mg/dL) 34  H  34  H
 
  Creatinine (0.5 - 1.0 mg/dL) 1.0  0.8
 
  Estimated GFR (>60 ml/min) 54  L  > 60
 
  BUN/Creatinine Ratio (7 - 25 %) 34.0  H  42.5  H
 
Hematology   
 
  CBC w Diff NO MAN DIFF REQ  
 
  WBC (4.8 - 10.8 /CUMM) 12.0  H 12.0  H 
 
  RBC (4.20 - 5.40 /CUMM) 3.58  L 3.41  L 
 
  Hgb (12.0 - 16.0 G/DL) 10.8  L 10.2  L 
 
  Hct (37 - 47 %) 31.5  L 29.8  L 
 
  MCV (81.0 - 99.0 FL) 88.1 87.4 
 
  MCH (27.0 - 31.0 PG) 30.2 29.9 
 
  MCHC (33.0 - 37.0 G/DL) 34.3 34.2 
 
  RDW (11.5 - 14.5 %) 14.8  H 14.3 
 
  Plt Count (130 - 400 /CUMM) 83  L 69  L 
 
  MPV (7.4 - 10.4 FL) 8.4 8.8 
 
  Gran % (42.2 - 75.2 %) 88.4  H 90.8  H 
 
  Lymphocytes % (20.5 - 51.1 %) 7.0  L 5.4  L 
 
  Monocytes % (1.7 - 9.3 %) 4.5 3.6 
 
  Eosinophils % (0 - 5 %) 0.1 0.1 
 
  Basophils % (0.0 - 2.0 %) 0 0.1 
 
  Absolute Granulocytes (1.4 - 6.5 /CUMM) 10.6  H 10.9  H 
 
  Absolute Lymphocytes (1.2 - 3.4 /CUMM) 0.8  L 0.7  L 
 
  Absolute Monocytes (0.10 - 0.60 /CUMM) 0.5 0.4 
 
  Absolute Eosinophils (0.0 - 0.7 /CUMM) 0 0 
 
  Absolute Basophils (0.0 - 0.2 /CUMM) 0 0 
 
 
 
 
 03/13
 
 0852
 
Chemistry 
 
  Sodium (137 - 145 mmol/L) 135  L
 
  Potassium (3.5 - 5.1 mmol/L) 3.5
 
  Chloride (98 - 107 mmol/L) 108  H
 
  Carbon Dioxide (22 - 30 mmol/L) 20  L
 
  Anion Gap (5 - 16) 7
 
  BUN (7 - 17 mg/dL) 38  H
 
  Creatinine (0.5 - 1.0 mg/dL) 1.0
 
  Estimated GFR (>60 ml/min) 54  L
 
  BUN/Creatinine Ratio (7 - 25 %) 38.0  H
 
  Total Bilirubin (0.2 - 1.3 mg/dL) 0.7
 
  Direct Bilirubin (< 0.4 mg/dL) 0.7  H
 
  AST (14 - 36 U/L) 13  L
 
  ALT (9 - 52 U/L) 23
 
  Alkaline Phosphatase (<127 U/L) 169  H
 
  Total Protein (6.3 - 8.2 g/dL) 4.2  L
 
  Albumin (3.5 - 5.0 g/dL) 2.3  L
 
Hematology 
 
  CBC w Diff NO MAN DIFF REQ
 
  WBC (4.8 - 10.8 /CUMM) 10.6
 
  RBC (4.20 - 5.40 /CUMM) 3.54  L
 
  Hgb (12.0 - 16.0 G/DL) 10.6  L
 
  Hct (37 - 47 %) 31.0  L
 
  MCV (81.0 - 99.0 FL) 87.5
 
  MCH (27.0 - 31.0 PG) 29.8
 
  MCHC (33.0 - 37.0 G/DL) 34.1
 
  RDW (11.5 - 14.5 %) 14.1
 
  Plt Count (130 - 400 /CUMM) 99  L
 
  MPV (7.4 - 10.4 FL) 7.9
 
  Gran % (42.2 - 75.2 %) 90.7  H
 
  Lymphocytes % (20.5 - 51.1 %) 5.1  L
 
  Monocytes % (1.7 - 9.3 %) 4.1
 
  Eosinophils % (0 - 5 %) 0.1
 
  Basophils % (0.0 - 2.0 %) 0
 
  Absolute Granulocytes (1.4 - 6.5 /CUMM) 9.7  H
 
  Absolute Lymphocytes (1.2 - 3.4 /CUMM) 0.5  L
 
  Absolute Monocytes (0.10 - 0.60 /CUMM) 0.4
 
  Absolute Eosinophils (0.0 - 0.7 /CUMM) 0
 
  Absolute Basophils (0.0 - 0.2 /CUMM) 0
 
 
 
Imaging/Other Studies:
Preliminary result of liver biopsy: Poorly differentiated adenocarcinoma.

## 2018-03-16 VITALS — SYSTOLIC BLOOD PRESSURE: 156 MMHG | DIASTOLIC BLOOD PRESSURE: 58 MMHG

## 2018-03-16 VITALS — SYSTOLIC BLOOD PRESSURE: 164 MMHG | DIASTOLIC BLOOD PRESSURE: 68 MMHG

## 2018-03-16 VITALS — DIASTOLIC BLOOD PRESSURE: 80 MMHG | SYSTOLIC BLOOD PRESSURE: 172 MMHG

## 2018-03-16 LAB
ABSOLUTE GRANULOCYTE CT: 11.2 /CUMM (ref 1.4–6.5)
BASOPHILS # BLD: 0 /CUMM (ref 0–0.2)
BASOPHILS NFR BLD: 0.2 % (ref 0–2)
EOSINOPHIL # BLD: 0 /CUMM (ref 0–0.7)
EOSINOPHIL NFR BLD: 0.1 % (ref 0–5)
ERYTHROCYTE [DISTWIDTH] IN BLOOD BY AUTOMATED COUNT: 14.9 % (ref 11.5–14.5)
GRANULOCYTES NFR BLD: 88.9 % (ref 42.2–75.2)
HCT VFR BLD CALC: 33.3 % (ref 37–47)
LYMPHOCYTES # BLD: 0.8 /CUMM (ref 1.2–3.4)
MCH RBC QN AUTO: 30.1 PG (ref 27–31)
MCHC RBC AUTO-ENTMCNC: 34.1 G/DL (ref 33–37)
MCV RBC AUTO: 88.5 FL (ref 81–99)
MONOCYTES # BLD: 0.5 /CUMM (ref 0.1–0.6)
PLATELET # BLD: 89 /CUMM (ref 130–400)
PMV BLD AUTO: 8.2 FL (ref 7.4–10.4)
RED BLOOD CELL CT: 3.77 /CUMM (ref 4.2–5.4)
WBC # BLD AUTO: 12.6 /CUMM (ref 4.8–10.8)

## 2018-03-16 NOTE — PN- GASTROENTEROLOGY
Assessment/Plan GI
Assessment/Recommendations:
ASSESSMENT:
1.  Metastastases to the Liver -- likely from colon.  Although pathology report 
is preliminary, it is unlikely to  options once finalized.
2.  Depression
3.  Poor performance status
 
RECOMMENDATIONS:
1.  Will speak with oncology.  Believe oncology should be involved regarding 
decision with respect to appropriateness of chemotherapy or other modalities to 
treat hepatic metastases.  I do not believe that patient is well and not have to
be seen in the outpatient setting.
2.  We'll also speak with pathology regarding finalizing pathologic report.
3.  Would increase dose of Lexapro to 20 mg as recommended by psychiatry but if 
no response to increased dose would have psychiatry see patient in follow-up as 
patient does not appear to have had any improvement in mood.
 
 
 
Subjective
Subjective:
Patient is depresssed after being told that she has metastatic cancer.  The 
pathology is still prelminary, but Dr. Alas had spoken with the pathologist 
who reported that it was definitely malignant.  Patient has not been seen by 
oncology.  Her performance status is clearly poor.  She does not want to return 
to [facility] and is waiting for a placement elsewhere.  She is not eating
much.  
 
Objective
Vital Signs and I&Os
Vital Signs
 
 
Date Time Temp Pulse Resp B/P B/P Pulse O2 O2 Flow FiO2
 
     Mean Ox Delivery Rate 
 
03/16 1536 98.1 68 20 156/58     
 
03/16 1536 98.1 68 20 156/58     
 
03/16 1536 98.1 68 20 156/58     
 
03/16 1536 98.1 68 20 156/58     
 
03/16 1536 98.1 68 20 156/58     
 
03/16 1502 98.1 68 20 156/58  98   
 
03/16 0641 98.5 66 18 172/80  97 Nasal 2.0L 
 
       Cannula  
 
03/16 0250    164/68     
 
03/16 0000       Nasal 2.0L 
 
       Cannula  
 
03/15 2330  63  174/60     
 
03/15 2304 97.3 64 20 183/88  93 Nasal 2.0L 
 
       Cannula  
 
03/15 2203  68  183/88     
 
03/15 2203  68  183/88     
 
 
 Intake & Output
 
 
 03/16 1600 03/16 0400 03/15 1600 03/15 0400 03/14 1600 03/14 0400
 
Intake Total 200 200  1000
 
Output Total      
 
Balance 200 200  1000
 
       
 
Intake, IV   600  1300 600
 
Intake, Oral 200 200 150 240 620 400
 
Number     0 
 
Bowel      
 
Movements      
 
 
 
 
Physical Exam
General Appearance: cachetic, lethargic
Head: atraumatic
Respiratory: lungs clear
Abdomen: soft, non-tender
Neurologic/Psychiatric: alert, depressed
Current Medications:
 Current Medications
 
 
  Sig/Jose E Start time  Last
 
Medication Dose Route Stop Time Status Admin
 
Albuterol Sulfate 3 ML Q4H PRN 03/07 0200 AC 
 
  INH   
 
Amlodipine Besylate 10 MG DAILY 03/07 1000 AC 03/15
 
  PO   1638
 
Cholecalciferol 1,000 IU BID 03/07 1000 AC 03/15
 
  PO   2203
 
Clonidine 0.3 MG Q12 03/07 1000 AC 03/15
 
  PO   2203
 
Docusate Sodium 100 MG BID 03/12 2341 AC 03/13
 
  PO   1021
 
Ergocalciferol 1,000 IU DAILY 03/16 1000 CAN 
 
  PO   
 
Escitalopram Oxalate 5 MG 0800 03/16 0800 AC 03/16
 
  PO   0826
 
Furosemide 20 MG DAILY 03/16 1050 AC 
 
  IV   
 
Furosemide 20 MG DAILY 03/16 1000 DC 
 
  PO   
 
Glycerin 2 SPRAY Q2P PRN 03/11 1915 AC 03/12
 
  PO   0457
 
Guaifenesin 10 ML Q6P PRN 03/07 0200 AC 
 
  PO   
 
Heparin Sodium  5,000 UNIT Q8 03/07 2200 AC 03/12
 
(Porcine)  SC   1730
 
Hydralazine HCl 100 MG BID 03/09 2200 AC 03/15
 
  PO   2203
 
Isosorbide  60 MG DAILY 03/07 1000 AC 03/14
 
Mononitrate  PO   1212
 
Metoprolol Tartrate 50 MG DAILY 03/07 1000 AC 03/15
 
  PO   1638
 
Mirtazapine 7.5 MG AT BEDTIME 03/15 2200 AC 03/15
 
  PO   2203
 
Omeprazole 40 MG DAILY AC 03/07 0700 AC 03/16
 
  PO   0710
 
Ondansetron HCl 4 MG Q6H 03/16 0710 AC 03/16
 
  IV   0826
 
Ondansetron HCl 4 MG Q6 PRN 03/16 0600 DC 
 
  IV   
 
Ondansetron HCl 4 MG Q6P PRN 03/10 1045 DC 03/15
 
  IV   2217
 
Oxycodone HCl 5 MG Q8P PRN 03/14 0615 AC 03/15
 
  PO   2317
 
Polyethylene Glycol 17 GM BID 03/07 1000 AC 03/13
 
  PO   1022
 
Senna 187 MG AT BEDTIME 03/12 2345 AC 03/13
 
  PO   0109
 
Spironolactone 50 MG DAILY 03/16 1000 AC 
 
  PO   
 
Tramadol HCl 50 MG DAILY 03/07 1000 AC 03/14
 
  PO   1215
 
 
 
 
Results
Pertinent Lab Results:
 Laboratory Tests
 
 
 03/16 03/15
 
 0848 0955
 
Chemistry  
 
  Sodium (137 - 145 mmol/L) 136  L 134  L
 
  Potassium (3.5 - 5.1 mmol/L) 3.9 3.6
 
  Chloride (98 - 107 mmol/L) 108  H 108  H
 
  Carbon Dioxide (22 - 30 mmol/L) 18  L 19  L
 
  Anion Gap (5 - 16) 10 6
 
  BUN (7 - 17 mg/dL) 34  H 34  H
 
  Creatinine (0.5 - 1.0 mg/dL) 1.0 1.0
 
  Estimated GFR (>60 ml/min) 54  L 54  L
 
  BUN/Creatinine Ratio (7 - 25 %) 34.0  H 34.0  H
 
Hematology  
 
  CBC w Diff NO MAN DIFF REQ NO MAN DIFF REQ
 
  WBC (4.8 - 10.8 /CUMM) 12.6  H 12.0  H
 
  RBC (4.20 - 5.40 /CUMM) 3.77  L 3.58  L
 
  Hgb (12.0 - 16.0 G/DL) 11.4  L 10.8  L
 
  Hct (37 - 47 %) 33.3  L 31.5  L
 
  MCV (81.0 - 99.0 FL) 88.5 88.1
 
  MCH (27.0 - 31.0 PG) 30.1 30.2
 
  MCHC (33.0 - 37.0 G/DL) 34.1 34.3
 
  RDW (11.5 - 14.5 %) 14.9  H 14.8  H
 
  Plt Count (130 - 400 /CUMM) 89  L 83  L
 
  MPV (7.4 - 10.4 FL) 8.2 8.4
 
  Gran % (42.2 - 75.2 %) 88.9  H 88.4  H
 
  Lymphocytes % (20.5 - 51.1 %) 6.5  L 7.0  L
 
  Monocytes % (1.7 - 9.3 %) 4.3 4.5
 
  Eosinophils % (0 - 5 %) 0.1 0.1
 
  Basophils % (0.0 - 2.0 %) 0.2 0
 
  Absolute Granulocytes (1.4 - 6.5 /CUMM) 11.2  H 10.6  H
 
  Absolute Lymphocytes (1.2 - 3.4 /CUMM) 0.8  L 0.8  L
 
  Absolute Monocytes (0.10 - 0.60 /CUMM) 0.5 0.5
 
  Absolute Eosinophils (0.0 - 0.7 /CUMM) 0 0
 
  Absolute Basophils (0.0 - 0.2 /CUMM) 0 0
 
 
 
 
 03/14 03/14
 
 1445 1215
 
Chemistry  
 
  Sodium (137 - 145 mmol/L)  131  L
 
  Potassium (3.5 - 5.1 mmol/L)  3.4  L
 
  Chloride (98 - 107 mmol/L)  103
 
  Carbon Dioxide (22 - 30 mmol/L)  18  L
 
  Anion Gap (5 - 16)  10
 
  BUN (7 - 17 mg/dL)  34  H
 
  Creatinine (0.5 - 1.0 mg/dL)  0.8
 
  Estimated GFR (>60 ml/min)  > 60
 
  BUN/Creatinine Ratio (7 - 25 %)  42.5  H
 
Hematology  
 
  WBC (4.8 - 10.8 /CUMM) 12.0  H 
 
  RBC (4.20 - 5.40 /CUMM) 3.41  L 
 
  Hgb (12.0 - 16.0 G/DL) 10.2  L 
 
  Hct (37 - 47 %) 29.8  L 
 
  MCV (81.0 - 99.0 FL) 87.4 
 
  MCH (27.0 - 31.0 PG) 29.9 
 
  MCHC (33.0 - 37.0 G/DL) 34.2 
 
  RDW (11.5 - 14.5 %) 14.3 
 
  Plt Count (130 - 400 /CUMM) 69  L 
 
  MPV (7.4 - 10.4 FL) 8.8 
 
  Gran % (42.2 - 75.2 %) 90.8  H 
 
  Lymphocytes % (20.5 - 51.1 %) 5.4  L 
 
  Monocytes % (1.7 - 9.3 %) 3.6 
 
  Eosinophils % (0 - 5 %) 0.1 
 
  Basophils % (0.0 - 2.0 %) 0.1 
 
  Absolute Granulocytes (1.4 - 6.5 /CUMM) 10.9  H 
 
  Absolute Lymphocytes (1.2 - 3.4 /CUMM) 0.7  L 
 
  Absolute Monocytes (0.10 - 0.60 /CUMM) 0.4 
 
  Absolute Eosinophils (0.0 - 0.7 /CUMM) 0 
 
  Absolute Basophils (0.0 - 0.2 /CUMM) 0

## 2018-03-16 NOTE — PN- ATT ADDEND
Attending Addendum
Attending Brief Note
77F PMH PVD s/p angioplasty with stent, HTN, CAD s/p CABG, CKD stage 4, uterine 
and colon cancer admitted with intractable nausea and vomiting secondary to 
acute gastroenteritis leading to pre-renal azotemia/RAHUL, with recently found 
multiple liver masses suspicious for metastases, underwent paracentesis and 
liver mass biopsy on 3/12 without complication.
 
Slowly improving.  Feels fatigued and weak.
 
1. Pre-renal azotemia/RAHUL secondary to dehydration
2. Acute gastroenteritis
3. Liver metastases
4. Hypokalemia
5. Hyponatremia (resolved)
6. 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
 
Plan
- Continue on general medicine
- Psychiatry consult for depression
- Monitor renal function 
- Follow GI and nephrology recommendations
- PT evaluation for discharge planning
- DVT PPx

## 2018-03-16 NOTE — PN- HOUSESTAFF
**See Addendum**
Subjective
Follow-up For:
liver masses suspected HCC or liver mets
anemia
depression
Subjective:
Patient was seen and examined at bedside.  She was resting comfortably.  She had
no acute events overnight.  Her nausea is improved today.  She is only 
complaining of fatigue and low energy.  Just continues to complain of right hand
swelling.  She has no other complaints.
 
 
Review of Systems
Constitutional:
Reports: no symptoms. 
EENTM:
Reports: no symptoms. 
Cardiovascular:
Reports: peripheral edema. 
Respiratory:
Reports: no symptoms. 
Gastrointestinal:
Denies: nausea, vomiting. 
Genitourinary:
Reports: no symptoms. 
Musculoskeletal:
Reports: no symptoms. 
Skin:
Reports: no symptoms. 
 
Objective
Last 24 Hrs of Vital Signs/I&O
 Vital Signs
 
 
Date Time Temp Pulse Resp B/P B/P Pulse O2 O2 Flow FiO2
 
     Mean Ox Delivery Rate 
 
 1536 98.1 68 20 156/58     
 
 1536 98.1 68 20 156/58     
 
 1536 98.1 68 20 156/58     
 
 1536 98.1 68 20 156/58     
 
 1536 98.1 68 20 156/58     
 
 1502 98.1 68 20 156/58  98   
 
 0641 98.5 66 18 172/80  97 Nasal 2.0L 
 
       Cannula  
 
 0250    164/68     
 
 0000       Nasal 2.0L 
 
       Cannula  
 
03/15 2330  63  174/60     
 
03/15 2304 97.3 64 20 183/88  93 Nasal 2.0L 
 
       Cannula  
 
03/15 2203  68  183/88     
 
03/15 2203  68  183/88     
 
 
 Intake & Output
 
 
  1600  0800  0000
 
Intake Total  200 200
 
Output Total   
 
Balance  200 200
 
    
 
Intake, Oral  200 200
 
 
 
 
Physical Exam
General Appearance: Oriented X3, Cooperative, No Acute Distress, cachexia and 
thethargic
Skin Temp/Moisture Exam: Warm/Dry
Cardiovascular: Regular Rate, Normal S1, Normal S2, systolic murmur
Lungs: Clear to Auscultation, Normal Air Movement
Abdomen: Normal Bowel Sounds, Soft, mild TTP
Neurological: Normal Speech, Sensation Intact
Extremities: 2+ pitting edema of the R hand and feet bilaterally
Current Medications:
 Current Medications
 
 
  Sig/Jose E Start time  Last
 
Medication Dose Route Stop Time Status Admin
 
Albuterol Sulfate 3 ML Q4H PRN  0200 AC 
 
  INH   
 
Amlodipine Besylate 10 MG DAILY  1000 AC 03/15
 
  PO   1638
 
Cholecalciferol 1,000 IU BID  1000 AC 03/15
 
  PO   2203
 
Clonidine 0.3 MG Q12  1000 AC 03/15
 
  PO   2203
 
Docusate Sodium 100 MG BID  2341 AC 
 
  PO   1021
 
Ergocalciferol 1,000 IU DAILY  1000 CAN 
 
  PO   
 
Escitalopram Oxalate 5 MG 0800  0800 AC 
 
  PO   0826
 
Furosemide 20 MG DAILY  1050 AC 
 
  IV   
 
Furosemide 20 MG DAILY  1000 DC 
 
  PO   
 
Glycerin 2 SPRAY Q2P PRN  1915 AC 
 
  PO   0457
 
Guaifenesin 10 ML Q6P PRN  0200 AC 
 
  PO   
 
Heparin Sodium  5,000 UNIT Q8  2200 AC 
 
(Porcine)  SC   1730
 
Hydralazine HCl 100 MG BID  2200 AC 03/15
 
  PO   2203
 
Isosorbide  60 MG DAILY  1000 AC 
 
Mononitrate  PO   1212
 
Metoprolol Tartrate 50 MG DAILY  1000 AC 03/15
 
  PO   1638
 
Mirtazapine 7.5 MG AT BEDTIME 03/15 2200 AC 03/15
 
  PO   2203
 
Omeprazole 40 MG DAILY AC  0700 AC 
 
  PO   0710
 
Ondansetron HCl 4 MG Q6H  0710 AC 
 
  IV   0826
 
Ondansetron HCl 4 MG Q6 PRN  0600 DC 
 
  IV   
 
Ondansetron HCl 4 MG Q6P PRN 03/10 1045 DC 03/15
 
  IV   2217
 
Oxycodone HCl 5 MG Q8P PRN  0615 AC 03/15
 
  PO   2317
 
Polyethylene Glycol 17 GM BID  1000 AC 
 
  PO   1022
 
Senna 187 MG AT BEDTIME  2345 AC 
 
  PO   0109
 
Spironolactone 50 MG DAILY  1000 AC 
 
  PO   
 
Tramadol HCl 50 MG DAILY  1000 AC 
 
  PO   1215
 
 
 
 
Last 24 Hrs of Lab/Lio Results
Last 24 Hrs of Labs/Mics:
 Laboratory Tests
 
18 0848:
Anion Gap 10, Estimated GFR 54  L, BUN/Creatinine Ratio 34.0  H, CBC w Diff NO 
MAN DIFF REQ, RBC 3.77  L, MCV 88.5, MCH 30.1, MCHC 34.1, RDW 14.9  H, MPV 8.2, 
Gran % 88.9  H, Lymphocytes % 6.5  L, Monocytes % 4.3, Eosinophils % 0.1, 
Basophils % 0.2, Absolute Granulocytes 11.2  H, Absolute Lymphocytes 0.8  L, 
Absolute Monocytes 0.5, Absolute Eosinophils 0, Absolute Basophils 0
 
 
Assessment/Plan
Assessment:
Patient is a 77-year-old female with a PMH significant for CKD stage IV, CAD 
status post CABG, PVD status post angioplasty with stent, HTN, uterine and colon
cancer, liver lesions either primary or metastatic cancer, who presented to 
[facility] from rehabilitation due to worsening  poor by mouth intake, 
nausea and vomiting, worsening ascites and worsening renal function.  
 
#RAHUL resolved
Likely prerenal azotemia, RAHUL has resolved, creatinine is back to baseline
- Continue to monitor BEP
- post void bladder scan read 39 ml
 
#anemia
H/H has been stable
 
#Worsening ascites, resolved with paracentesis
Patient with a large volume tap with liver biopsy 3/12/18.  She tolerated the 
procedure well.  
-albumin DCed 
 
#chronic pressure ulcer
This appears to be improving, ulceration is still present with smaller in size 
with signs of new skin growth around the periphery
-Treat with DuoDERM and offloading
 
#Mild hyponatremia
Patient started on maintenance IV normal saline
 
#mild Protein calorie malnutrition
Nutrition consult was placed.
-Will follow up nutrition recommendations, for now patient is on sodium 
restricted diet
-We'll start Remeron which will both help with sleep and possibly stimulate 
appetite.
 
#Fatigue and possible depresion
Psychiatry list the patient is significantly depressed, with patient's hepatic 
pathology and CKD medicating will be difficult.
- Continue Lexapro 5 mg daily, will titrate up the possible
 
#Physical deconditioning
Patient agreed to work with physical therapy today and went to the edge of the 
bed
-Continue to encourage to work with PT
 
Diet: Heary healthy diet
DVT prophylaxis:SC heparin, ALPS
Code status: full code
Problem List:
 1. Pressure ulcer
 
 2. Mass of multiple sites of liver
 
 3. Decreased appetite
 
Pain Ratin
Pain Location:
none
Pain Goal: Remain pain free
Pain Plan:
pain pathway
Tomorrow's Labs & Rationales:
cbc

## 2018-03-17 VITALS — DIASTOLIC BLOOD PRESSURE: 70 MMHG | SYSTOLIC BLOOD PRESSURE: 152 MMHG

## 2018-03-17 VITALS — DIASTOLIC BLOOD PRESSURE: 60 MMHG | SYSTOLIC BLOOD PRESSURE: 118 MMHG

## 2018-03-17 VITALS — DIASTOLIC BLOOD PRESSURE: 80 MMHG | SYSTOLIC BLOOD PRESSURE: 140 MMHG

## 2018-03-17 VITALS — DIASTOLIC BLOOD PRESSURE: 71 MMHG | SYSTOLIC BLOOD PRESSURE: 178 MMHG

## 2018-03-17 NOTE — PN- ATT ADDEND
Attending Addendum
Attending Brief Note
Pt was seen and evalauted by me. Chart reviwed.  As per RN, poor PO intake, 
refused her meds today.  Has been sleepy and lethargic.
 
 Vital Signs
 
 
Date Time Temp Pulse Resp B/P B/P Pulse O2 O2 Flow FiO2
 
     Mean Ox Delivery Rate 
 
03/17 0729 98.2 91 20 118/60  93 Room Air  
 
03/17 0000      95 Nasal 1.0L 
 
       Cannula  
 
03/16 2309 98.4 78 20   95   
 
03/16 2145  80  158/72     
 
03/16 2144  80  158/72     
 
03/16 1536 98.1 68 20 156/58     
 
03/16 1536 98.1 68 20 156/58     
 
03/16 1536 98.1 68 20 156/58     
 
03/16 1536 98.1 68 20 156/58     
 
03/16 1536 98.1 68 20 156/58     
 
03/16 1502 98.1 68 20 156/58  98   
 
 
 Intake & Output
 
 
 03/17 1600 03/17 0800 03/17 0000
 
Intake Total  50 100
 
Output Total   
 
Balance  50 100
 
    
 
Intake, Oral  50 100
 
 
 
GEN: sleepy but abble to answer few question
HEENT: icterus
 
Labs/Diagnostics: reviewed
 
A/P:
Ms. Wild is a 77F PMH PVD s/p angioplasty with stent, HTN, CAD s/p CABG, CKD 
stage 4, uterine and colon cancer admitted with intractable nausea and vomiting 
secondary to acute gastroenteritis leading to pre-renal azotemia/RAHUL, with 
recently found multiple liver masses suspicious for metastases, underwent 
paracentesis and liver mass biopsy on 3/12 without complication
 
GI:
--appreciate GI eval
--s/p large volume tap with liver biopsy 3/12/18.  
 
Skin:
--chronic pressure ulcer
--slow improvement 
--cont to treat with DuoDERM and offloading
 
F/E/N: mild hyponatremia
-- pt was started on maintenance IV normal saline
-- f/u nutrition consult
-- pt was started on Remeron 
 
Psych: Fatigue and possible depresion
--Psychiatry list the patient is significantly depressed, due to patient's 
hepatic pathology and CKD treatment/medication will be difficult.
-- Continue Lexapro 5 mg daily, will titrate up the possible

## 2018-03-18 VITALS — DIASTOLIC BLOOD PRESSURE: 66 MMHG | SYSTOLIC BLOOD PRESSURE: 150 MMHG

## 2018-03-18 VITALS — SYSTOLIC BLOOD PRESSURE: 140 MMHG | DIASTOLIC BLOOD PRESSURE: 75 MMHG

## 2018-03-18 NOTE — PN- HOUSESTAFF
Pretty PINTO,Theodore 18 0844:
Subjective
Follow-up For:
depression
Liver masses
 
Subjective:
Patient was seen and examined.  She was resting comfortably.  She had no acute 
events overnight.  She continues to complain of bilateral feet swelling and 
right hand swelling, nausea is improving.  She continues to have decreased 
appetite, and refuses medications occasionally however she  agreed to take all 
of her medications this morning.  She has also been refusing labs and did not 
labs to be drawn this morning.  She continues to report a depressed mood, and 
has no new complaints.
 
Review of Systems
Constitutional:
Reports: malaise.  Denies: chills, fever. 
EENTM:
Reports: no symptoms. 
Cardiovascular:
Reports: no symptoms. 
Respiratory:
Reports: no symptoms. 
Gastrointestinal:
Reports: constipation.  Denies: abdominal pain, nausea. 
Genitourinary:
Reports: no symptoms. 
Musculoskeletal:
Reports: no symptoms. 
 
Objective
Last 24 Hrs of Vital Signs/I&O
 Vital Signs
 
 
Date Time Temp Pulse Resp B/P B/P Pulse O2 O2 Flow FiO2
 
     Mean Ox Delivery Rate 
 
 0634 98.4 98 20 150/66  90 Room Air  
 
2012  87  152/70     
 
2012  87  152/70     
 
2010 98.9 87 18 152/70  93 Room Air  
 
 1830    140/80     
 
 1551       Room Air  
 
 1444 97.8 76 18 178/71     
 
 1444 97.8 76 18 178/71     
 
 1444 97.8 76 18 178/71     
 
 1444 97.8 76 18 178/71     
 
 1416 97.8 76 18   94 Room Air  
 
 
 Intake & Output
 
 
  1600  0800  0000
 
Intake Total  160 480
 
Output Total   
 
Balance  160 480
 
    
 
Intake, IV  0 
 
Intake, Oral  160 480
 
Number  0 
 
Bowel   
 
Movements   
 
 
 
 
Physical Exam
General Appearance: Cooperative, No Acute Distress, lethargic
Skin Temp/Moisture Exam: Warm/Dry
Cardiovascular: Regular Rate, Normal S1, Normal S2, systolic murmur
Lungs: Clear to Auscultation, Normal Air Movement
Abdomen: mild distension, likely reaccumulation of ascites
Neurological: Normal Speech, Normal Tone, Sensation Intact
Extremities: 2 + dependent pitting edema of the feet bilaterally and the R hand
Current Medications:
 Current Medications
 
 
  Sig/Jose E Start time  Last
 
Medication Dose Route Stop Time Status Admin
 
Albuterol Sulfate 3 ML Q4H PRN  0200 AC 
 
  INH   
 
Amlodipine Besylate 10 MG DAILY  1000 AC 03/15
 
  PO   1638
 
Cholecalciferol 1,000 IU BID  1000 AC 03/15
 
  PO   2203
 
Clonidine 0.3 MG Q12  1000 AC 
 
  PO   2012
 
Docusate Sodium 100 MG BID  2341 AC 
 
  PO   1021
 
Escitalopram Oxalate 10 MG 08 0800 UNVr 
 
  PO   
 
Escitalopram Oxalate 5 MG 0800  0800 DC 
 
  PO   0812
 
Furosemide 20 MG DAILY  1050 AC 
 
  IV   
 
Glycerin 2 SPRAY Q2P PRN  1915 AC 
 
  PO   0457
 
Guaifenesin 10 ML Q6P PRN  0200 AC 
 
  PO   
 
Heparin Sodium  5,000 UNIT Q8  2200 AC 
 
(Porcine)  SC   1730
 
Hydralazine HCl 100 MG BID  2200 AC 
 
  PO   2012
 
Isosorbide  60 MG DAILY  1000 AC 
 
Mononitrate  PO   1212
 
Metoprolol Tartrate 50 MG DAILY  1000 AC 03/15
 
  PO   1638
 
Mirtazapine 7.5 MG AT BEDTIME 03/15 2200 AC 
 
  PO   2014
 
Omeprazole 40 MG DAILY AC  0700 AC 
 
  PO   0710
 
Ondansetron HCl 4 MG Q6H  0710 AC 
 
  IV   0826
 
Oxycodone HCl 5 MG Q8P PRN  0615 AC 
 
  PO   2018
 
Polyethylene Glycol 17 GM BID  1000 AC 
 
  PO   1022
 
Senna 187 MG AT BEDTIME  2345 AC 
 
  PO   0109
 
Spironolactone 50 MG DAILY  1000 AC 
 
  PO   
 
Tramadol HCl 50 MG DAILY  1000 AC 
 
  PO   1215
 
 
 
 
Assessment/Plan
Assessment:
Patient is a 77-year-old female with a PMH significant for CKD stage IV, CAD 
status post CABG, PVD status post angioplasty with stent, HTN, uterine and colon
cancer, liver lesions either primary or metastatic cancer, who presented to 
[facility] from rehabilitation due to worsening  poor by mouth intake, 
nausea and vomiting, worsening ascites and worsening renal function.  
 
 
#Liver masses
Patient had ultrasound-guided biopsy with IR, awaiting final path report.  Our 
team had reached out to the oncology service and we were told to await final 
pathology report.  Per discussion with gastroenterologist, Dr. Jolly, she may
reach out to Dr. Ogden on Monday.
 
#anemia
H/H has been stable, patient refused lab draws this morning
 
#Worsening ascites, resolved with paracentesis
Patient with a large volume tap with liver biopsy 3/12/18.  She tolerated the 
procedure well.  
-Appears that a small volume of reaccumulation is occurring with mild abdominal 
distention from baseline post large volume tap
 
#chronic pressure ulcer
This appears to be improving, ulceration is still present with smaller in size 
with signs of new skin growth around the periphery
-Treat with DuoDERM and offloading
 
#Mild hyponatremia
Patient did not allow laboratory draws morning, maintenance fluids on hold 
currently due to signs of volume overload with dependent edema.
 
#mild Protein calorie malnutrition
Nutrition consult was placed.
-Will follow up nutrition recommendations, for now patient is on sodium 
restricted diet
-We'll start Remeron which will both help with sleep and possibly stimulate 
appetite.
 
#Fatigue and possible depresion
Psychiatry list the patient is significantly depressed, with patient's hepatic 
pathology and CKD medicating will be difficult.
-Increased Lexapro to 10 mg per psych recommendations, request reevaluation if 
patient shows no response.
 
#Physical deconditioning
Patient agreed to work with physical therapy today and went to the edge of the 
bed
-Continue to encourage to work with PT
 
#Constipation
Patient has been refusing MiraLAX secondary to the taste, she has been compliant
with Colace.
-Dulcolax suppository
 
Diet: Heary healthy diet
DVT prophylaxis:SC heparin, ALPS
Code status: full code
Problem List:
 1. Pressure ulcer
 
 2. Decreased appetite
 
 3. Mass of multiple sites of liver
 
Pain Ratin
Pain Location:
none
Pain Goal: Remain pain free
Pain Plan:
pain pathway
Tomorrow's Labs & Rationales:
cbc, bep
 
 
Xiomy PINTO,Amir 18 1143:
Attending MD Review Statement
 
Attending Statement
Attending MD Statement: examined this patient, discuss w/resident/PA/NP, agreed 
w/resident/PA/NP, reviewed EMR data (avail), discussed with nursing
Attending Assessment/Plan:
As per RN pt took all her meds today.  Has been refusing meds intermittently.  
Cont to monitor.  F/GI recs.  Rest of the plan as per resident's note

## 2018-03-19 VITALS — DIASTOLIC BLOOD PRESSURE: 62 MMHG | SYSTOLIC BLOOD PRESSURE: 144 MMHG

## 2018-03-19 VITALS — SYSTOLIC BLOOD PRESSURE: 160 MMHG | DIASTOLIC BLOOD PRESSURE: 78 MMHG

## 2018-03-19 VITALS — SYSTOLIC BLOOD PRESSURE: 130 MMHG | DIASTOLIC BLOOD PRESSURE: 60 MMHG

## 2018-03-19 NOTE — CONS- ONCOLOGY
General Information and HPI
 
Consulting Request
Date of Consult: 03/19/18
Requested By:
James Camp MD
 
Reason for Consult:
liver mass, colon cancer, uterine cancer
Source of Information: patient, old records
Exam Limitations: no limitations
History of Present Illness:
Ms. Perez is a 77-year-old female with HTN, HLD, CKD stage 4, CAD s/p triple 
vessel CABG (over 10 years ago), sigmoid colon cancer s/p resections (in the 
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 
over 10 years ago and most recent angiography done in 2014, and chronic back 
pain who presented from [facility] for increasing ascites, worsening CKD, 
and recently noted masses on MRI.  She was admitted to the [facility] in 
February for back pain and increasing abdominal distension. Imaging at that time
demonstrated enlarging liver mass (present in 2016 on CT).  Since admission, she
has had a paracentesis and liver biopy on 3/12/2018.  Preliminary result is 
malignant. She is currently feeling about the same.  She has decreased appetite 
and swelling in the right upper extremity.  She denies any new pain.  She feels 
constipated.  She does not move around much.  She is not active. Her creatinine 
has improved.  
 
Allergies/Medications
Allergies:
Coded Allergies:
Sulfa (Sulfonamide Antibiotics) (Severe, HIVES 11/20/16)
 
Home Med List:
Albuterol Sulfate 0.63 MG/3 ML VIAL.NEB   1 Vial INH/SOL 4 TIMES/DAY PRN COPD  (
Reported)
Amlodipine Besylate 10 MG TABLET   1 TAB PO DAILY HTN  (Reported)
Aspirin (Aspirin*) 81 MG TAB.CHEW   1 TAB PO DAILY HEART HEALTH  (Reported)
Cholecalciferol (Vitamin D3) (Vitamin D3) 2,000 UNIT CAPSULE   1 CAP PO BID 
SUPPLEMENT  (Reported)
Clonidine HCl 0.3 MG TABLET   1 TAB PO Q12 HTN  (Reported)
Guaifenesin/Dextromethorphan (Robitussin Cough-Chest Dm Liq) 100 MG-5 MG/5 ML 
LIQUID   10 ML PO Q4 PRN COUGH  (Reported)
Hydralazine HCl 25 MG TABLET   3 TAB PO TID HTN  (Reported)
Isosorbide Mononitrate (Isosorbide Mononitrate ER) 60 MG TAB.ER.24H   1 TAB PO 
DAILY HTN  (Reported)
     HOLD FOR SBP<100MMGH
Metoprolol Tartrate (Lopressor) 50 MG TABLET   1 TAB PO DAILY HTN  (Reported)
Omega-3 Fatty Acids/Fish Oil (Fish Oil 1,000 MG Capsule) 340 MG-1,000 MG CAPSULE
  2 CAP PO DAILY SUPPLEMENT  (Reported)
Omeprazole 40 MG CAPSULE.DR   1 CAP PO DAILY ACID REFLUX  (Reported)
Ondansetron HCl (Zofran) 4 MG TABLET   1 TAB PO TID NAUSEA  (Reported)
Oxycodone HCl 5 MG TABLET   1 TAB PO Q8P PAIN MODERATE  (Reported)
Polyethylene Glycol 3350 (Miralax) 17 GRAM/DOSE POWDER   17 GM PO BID GI
Tramadol HCl 50 MG TABLET   1 TAB PO DAILY PAIN  (Reported)
 
Current Medications:
 Current Medications
 
 
  Sig/Jose E Start time  Last
 
Medication Dose Route Stop Time Status Admin
 
Albuterol Sulfate 3 ML Q4H PRN 03/07 0200 AC 
 
  INH   
 
Amlodipine Besylate 10 MG DAILY 03/07 1000 AC 03/18
 
  PO   0933
 
Bisacodyl 10 MG ONCE ONE 03/18 1115 DC 
 
  AK 03/18 1116  
 
Cholecalciferol 1,000 IU BID 03/07 1000 AC 03/18
 
  PO   2122
 
Clonidine 0.3 MG Q12 03/07 1000 AC 03/18
 
  PO   2122
 
Docusate Sodium 100 MG BID 03/12 2341 AC 03/18
 
  PO   2123
 
Escitalopram Oxalate 10 MG 0800 03/19 0800 AC 
 
  PO   
 
Escitalopram Oxalate 5 MG 0800 03/16 0800 DC 03/18
 
  PO   0812
 
Furosemide 20 MG DAILY 03/16 1050 AC 03/18
 
  IV   0916
 
Glycerin 2 SPRAY Q2P PRN 03/11 1915 AC 03/12
 
  PO   0457
 
Guaifenesin 10 ML Q6P PRN 03/07 0200 AC 
 
  PO   
 
Heparin Sodium  5,000 UNIT Q8 03/07 2200 AC 03/12
 
(Porcine)  SC   1730
 
Hydralazine HCl 100 MG BID 03/09 2200 AC 03/18
 
  PO   2122
 
Isosorbide  60 MG DAILY 03/07 1000 AC 03/18
 
Mononitrate  PO   0917
 
Metoprolol Tartrate 50 MG DAILY 03/07 1000 AC 03/18
 
  PO   0915
 
Mirtazapine 7.5 MG AT BEDTIME 03/15 2200 AC 03/18
 
  PO   2123
 
Omeprazole 40 MG DAILY AC 03/07 0700 AC 03/19
 
  PO   0540
 
Ondansetron HCl 4 MG Q6H 03/16 0710 AC 03/16
 
  IV   0826
 
Oxycodone HCl 5 MG Q8P PRN 03/14 0615 AC 03/18
 
  PO   2122
 
Polyethylene Glycol 17 GM BID 03/07 1000 AC 03/13
 
  PO   1022
 
Senna 187 MG AT BEDTIME 03/12 2345 AC 03/18
 
  PO   2123
 
Spironolactone 50 MG DAILY 03/16 1000 AC 03/18
 
  PO   0916
 
Tramadol HCl 50 MG DAILY 03/07 1000 AC 03/14
 
  PO   1215
 
 
 
 
Review of Systems
 
Review of Systems
Constitutional:
Reports: weakness, unexplained weight loss.  Denies: chills, fever. 
Cardiovascular:
Denies: chest pain. 
Respiratory:
Denies: short of breath. 
GI:
Reports: constipation, distention.  Denies: nausea, vomiting. 
Musculoskeletal:
Reports: back pain. 
Neurological/Psychological:
Reports: anxiety. 
Hematologic/Endocrine:
Denies: bruising, bleeding. 
Immunologic/Allergic:
Denies: lymphadenopathy. 
All Other Systems: Reviewed and Negative
 
Past History
 
Travel History
Traveled to [place] past 21 day No
 
Medical History
Blood Transfusion Hx: Yes
Neurological: CVA
EENT: NONE
Cardiovascular: CAD, hypertension, hyperlipidemia, PVD
Respiratory: NONE
Gastrointestinal: NAUSEA VOMITING DIARRHEA
Hepatic: NONE
Renal: chronic kidney disease
Musculoskeletal: chronic back pain, degen joint disease, falls, gout
Psychiatric: NONE
Endocrine: thyroid disease
Blood Disorders: anemia
Cancer(s): basal cell carcinoma, colon/rectal cancer, endometrial cancer
GYN/Reproductive: NONE
 
Surgical History
Surgical History: CABG, hysterectomy, PARTIAL SIGMOIDECTOMY BACK SURGERY 
CAROTIDECTOMY LE STENT PLACEMENT
 
Family History
Relations & Conditions If Any:
MOTHER (Unknown - pt adolted).
FATHER (unknown - pt adopted).
 
 
Psychosocial History
Where Do You Live? Extended Care Facility
Who Do You Live With? self
Services at Home: None
Primary Language: English
Smoking Status: Former Smoker
ETOH Use: denies use
Living Will? yes
Power of /HCP? yes
Name of POA/HCP: Clare Alston- friend/POA
 
Functional Ability
ADLs
Independent: dressing, eating, toileting, bathing. 
Ambulation: cane
IADLs
Independent: shopping, housework, finances, food prep, telephone, transportation
, medication admin. 
 
Exam & Diagnostic Data
Vital Signs and I&O
Vital Signs
 
 
Date Time Temp Pulse Resp B/P B/P Pulse O2 O2 Flow FiO2
 
     Mean Ox Delivery Rate 
 
03/19 0634 98.1 66 18 144/62  93 Room Air  
 
03/18 2122  68  140/75     
 
03/18 2122  68  140/75     
 
03/18 1500 97.7 68 20 140/75  94   
 
03/18 0933 98.4 98 20 150/66     
 
03/18 0917 98.4 98  150/66     
 
03/18 0917 98.4 98 20 150/66     
 
03/18 0916 98.4 98 20 150/66     
 
03/18 0915 98.4 98 20 150/66     
 
 
 Intake & Output
 
 
 03/19 0800 03/19 0000 03/18 1600
 
Intake Total 360 120 300
 
Output Total   
 
Balance 360 120 300
 
    
 
Intake, Oral 360 120 300
 
Number 0  
 
Bowel   
 
Movements   
 
 
 
 
Physical Exam
General Appearance: no apparent distress, alert, awake, comfortable, thin
Head: atraumatic, normal appearance
Eyes:
Bilateral: PERRL, EOMI. 
Ears, Nose, Throat: normal pharynx
Respiratory: normal breath sounds, chest non-tender, no respiratory distress, 
quiet respiration
Cardiovascular: regular rate/rhythm
Gastrointestinal: normal bowel sounds, soft, non-tender
Extremities: normal inspection
Neurologic/Psych: awake, alert, oriented x 3
Skin: normal color, warm/dry
Lymphatic: no anterior cervical alyse
Last 48 Hours of Lab Results:
 Laboratory Tests
 
 
 03/18
 
 0600
 
Chemistry 
 
  Sodium Cancelled
 
  Potassium Cancelled
 
  Chloride Cancelled
 
  Carbon Dioxide Cancelled
 
  Anion Gap Cancelled
 
  BUN Cancelled
 
  Creatinine Cancelled
 
  BUN/Creatinine Ratio Cancelled
 
Hematology 
 
  CBC w Diff Cancelled
 
  WBC Cancelled
 
  RBC Cancelled
 
  Hgb Cancelled
 
  Hct Cancelled
 
  MCV Cancelled
 
  MCH Cancelled
 
  MCHC Cancelled
 
  RDW Cancelled
 
  Plt Count Cancelled
 
  MPV Cancelled
 
 
 
Imaging/Other Studies:
MRI Abdomen 2/27/2018:
Multiple hepatic lesions are seen which demonstrate heterogeneous enhancement. 
Given interval enlargement and increased number of lesions since CT dating back 
to 09/28/2016, findings are most concerning for hepatocellular carcinoma. 
Metastatic disease is in the differential. Large volume ascites. Fluid extends 
into a subxiphoid ventral hernia. Multiple renal cysts. Possible hemorrhagic 
cyst in the upper pole of the right kidney. Small lobulated cystic lesion noted 
at the inferior aspect of the mid pancreatic body. Please note that the current 
study is not optimized for characterization of pancreatic lesions. Possible 
gallbladder sludge.
 
CT Abdomen/Pelvis 1/29/2018:
1. Large volume of abdominal ascites. Generalized anasarca. Small left pleural 
effusion.
2. Suspicious large lesion in the liver. This can be further characterized with 
dynamic MRI.
3. Degenerative changes of the spine. Status post left-sided laminectomy L4 
vertebra.
 
Assessment/Plan
Assessment:
Ms. Perez is a 77-year-old female with HTN, HLD, CKD stage 4, CAD s/p triple 
vessel CABG (over 10 years ago), sigmoid colon cancer s/p resections (in the 
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 
over 10 years ago and most recent angiography done in 2014, and chronic back 
pain who presented from [facility] for increasing ascites, worsening CKD, 
and recently noted masses on MRI. Since admission, she has had liver biopsy with
paracentesis.  Ascitic fluid was negative on 3/8/2018.  Preliminary of the liver
biopsy on 3/12/2018 is malignant.  AFP was normal.  CEA is elevated at 11.7.  
Differentials include colon, uterine, HCC, and hepatobiliary disease. HCC is 
less likely given normal AFP.  Treatment will be dependent on the pathology. Her
performance status will be the biggest factor in her ability to tolerate 
potential therapy. She will need to have improved PS.  She is working with PT 
and nutrition.  I have also discussed option of hospice if she wish to not 
pursue therapy.  She will follow up as outpatient to discuss options.
Recommendations:
Liver mass:
-follow up pathology
-treatment options to be discussed base on pathology
-follow up as outpatient
 
Failure to thrive:
-PT evaluation
-nutrition evaluation
Problem List:
 1. Liver lesion
 
 2. Renal insufficiency
 
 3. History of colon cancer
 
 4. History of uterine cancer
 
Other Findings/Comments:
Please call [phone] with any questions or concerns.
 
Consult Acknowledgment
- Thank you for your consult request.

## 2018-03-19 NOTE — PN- HOUSESTAFF
Pretty PINTO,Theodore 18 0739:
Subjective
Follow-up For:
cacinoma of the liver
Subjective:
Patient was seen and examined at bedside.  She was sleeping comfortably.  She 
had no acute events overnight.  She is continuing to refuse medications and not 
allow the nurse to administer any meds this morning.  She continues to have a 
depressed mood, who will not work with physical therapy.  She denies any new 
complaints, is no longer having nausea, and denies any pain.
 
Review of Systems
Constitutional:
Reports: no symptoms. 
EENTM:
Reports: no symptoms. 
Cardiovascular:
Reports: no symptoms. 
Respiratory:
Reports: no symptoms. 
Gastrointestinal:
Reports: no symptoms. 
Genitourinary:
Reports: no symptoms. 
Musculoskeletal:
Reports: no symptoms. 
 
Objective
Last 24 Hrs of Vital Signs/I&O
 Vital Signs
 
 
Date Time Temp Pulse Resp B/P B/P Pulse O2 O2 Flow FiO2
 
     Mean Ox Delivery Rate 
 
 0634 98.1 66 18 144/62  93 Room Air  
 
2122  68  140/75     
 
2122  68  140/75     
 
 1500 97.7 68 20 140/75  94   
 
 0933 98.4 98 20 150/66     
 
 0917 98.4 98  150/66     
 
 0917 98.4 98 20 150/66     
 
 0916 98.4 98 20 150/66     
 
 0915 98.4 98 20 150/66     
 
 
 Intake & Output
 
 
  0800  0000  1600
 
Intake Total 360 120 300
 
Output Total   
 
Balance 360 120 300
 
    
 
Intake, Oral 360 120 300
 
Number 0  
 
Bowel   
 
Movements   
 
 
 
 
Physical Exam
General Appearance: Alert, Oriented X3, No Acute Distress
Skin Temp/Moisture Exam: Warm/Dry
Cardiovascular: Regular Rate, Normal S1, Normal S2, systolic murmur
Lungs: Clear to Auscultation, Normal Air Movement
Abdomen: mildly increased distension
Neurological: Normal Speech, Normal Tone, Sensation Intact
Extremities: 2+ dependent pitting edema of the feet bilaterally and 2 + pitting 
edema of the R hand
Current Medications:
 Current Medications
 
 
  Sig/Jose E Start time  Last
 
Medication Dose Route Stop Time Status Admin
 
Albuterol Sulfate 3 ML Q4H PRN  0200 AC 
 
  INH   
 
Amlodipine Besylate 10 MG DAILY  1000 AC 
 
  PO   0933
 
Bisacodyl 10 MG ONCE ONE  1115 DC 
 
  HI  1116  
 
Cholecalciferol 1,000 IU BID  1000 AC 
 
  PO   2122
 
Clonidine 0.3 MG Q12  1000 AC 
 
  PO   2122
 
Docusate Sodium 100 MG BID  2341 AC 
 
  PO   2123
 
Escitalopram Oxalate 10 MG 0800  0800 AC 
 
  PO   
 
Escitalopram Oxalate 5 MG 0800  0800 DC 
 
  PO   0812
 
Furosemide 20 MG DAILY  1050 AC 
 
  IV   0916
 
Glycerin 2 SPRAY Q2P PRN  1915 AC 
 
  PO   0457
 
Guaifenesin 10 ML Q6P PRN  0200 AC 
 
  PO   
 
Heparin Sodium  5,000 UNIT Q8  2200 AC 
 
(Porcine)  SC   1730
 
Hydralazine HCl 100 MG BID  2200 AC 
 
  PO   2122
 
Isosorbide  60 MG DAILY  1000 AC 
 
Mononitrate  PO   0917
 
Metoprolol Tartrate 50 MG DAILY  1000 AC 
 
  PO   0915
 
Mirtazapine 7.5 MG AT BEDTIME 03/15 2200 AC 
 
  PO   2123
 
Omeprazole 40 MG DAILY AC  0700 AC 
 
  PO   0540
 
Ondansetron HCl 4 MG Q6H  0710 AC 
 
  IV   0826
 
Oxycodone HCl 5 MG Q8P PRN  0615 AC 
 
  PO   2122
 
Polyethylene Glycol 17 GM BID  1000 AC 
 
  PO   1022
 
Senna 187 MG AT BEDTIME  2345 AC 
 
  PO   2123
 
Spironolactone 50 MG DAILY  1000 AC 
 
  PO   0916
 
Tramadol HCl 50 MG DAILY  1000 AC 
 
  PO   1215
 
 
 
 
Assessment/Plan
Assessment:
Patient is a 77-year-old female with a PMH significant for CKD stage IV, CAD 
status post CABG, PVD status post angioplasty with stent, HTN, uterine and colon
cancer, liver lesions either primary or metastatic cancer, who presented to 
[facility] from rehabilitation due to worsening  poor by mouth intake, 
nausea and vomiting, worsening ascites and worsening renal function.  
 
#Edema of the right hand
Will pursue right upper extremity ultrasound to rule out DVT in the setting of 
malignancy.
-Follow-up Doppler ultrasound results
 
#Liver masses
Preliminary pathology report shows poorly differentiated carcinoma, it has been 
sent to [facility] for further workup
-Follow-up final pathology
 
#ascites, resolved with paracentesis
Patient with a large volume tap with liver biopsy 3/12/18.  She tolerated the 
procedure well.  
-Appears that a small volume of reaccumulation is occurring with mild abdominal 
distention from baseline post large volume tap with mild abdominal distention 
from baseline post large volume tap
 
#chronic pressure ulcer
This appears to be improving, ulceration is still present with smaller in size 
with signs of new skin growth around the periphery
-Treat with DuoDERM and offloading
 
#Mild hyponatremia
Patient did not allow laboratory draws morning, maintenance fluids on hold 
currently due to signs of volume overload with dependent edema.
 
#mild Protein calorie malnutrition
Nutrition consult was placed.
-Will follow up nutrition recommendations, for now patient is on sodium 
restricted diet
-We'll start Remeron which will both help with sleep and possibly stimulate 
appetite.
 
#Fatigue and depresion
Psychiatry believes the patient is significantly depressed, with patient's 
hepatic pathology and CKD medicating will be difficult.
-Continue Lexapro 10 mg
 
#Physical deconditioning
Patient got to the side of the bed on Friday, however she has continued to 
refuse to work with physical therapy.
-Continue to encourage to work with PT
 
#Constipation
Patient has been refusing MiraLAX secondary to the taste.
-Dulcolax suppository
 
Diet: Heary healthy diet
DVT prophylaxis:SC heparin, ALPS
Code status: full code
Problem List:
 1. Carcinoma of liver
 
Pain Ratin
Pain Location:
none
Pain Goal: Remain pain free
Pain Plan:
pain pathway
Tomorrow's Labs & Rationales:
cbc, bep
 
 
James Camp MD 18 1402:
Attending MD Review Statement
 
Attending Statement
Attending MD Statement: examined this patient, discuss w/resident/PA/NP, agreed 
w/resident/PA/NP, reviewed EMR data (avail)
Attending Assessment/Plan:
Will attempt to have patient work with PT, follow psychiatry recommendations for
depression, oncology recommendations for pathology

## 2018-03-20 VITALS — DIASTOLIC BLOOD PRESSURE: 80 MMHG | SYSTOLIC BLOOD PRESSURE: 135 MMHG

## 2018-03-20 VITALS — SYSTOLIC BLOOD PRESSURE: 130 MMHG | DIASTOLIC BLOOD PRESSURE: 84 MMHG

## 2018-03-20 NOTE — PN- HOUSESTAFF
Pretty PINTO,Theodore 18 0716:
Subjective
Follow-up For:
Cancer of the liver, unclear at this time whether primary or metastatic
Failure to thrive
Subjective:
Patient was seen and examined at bedside.  She was sleeping comfortably.  She 
had no acute events overnight.  She continues to refuse medications.  She 
refused a repeat IV line placed.  She is complaining of nausea with some 
episodes of vomiting.  She is unwilling to discuss her goals of care.  She is 
also willing to work with physical therapy.
 
Review of Systems
Constitutional:
Reports: no symptoms. 
EENTM:
Reports: no symptoms. 
Cardiovascular:
Reports: no symptoms. 
Respiratory:
Reports: no symptoms. 
Gastrointestinal:
Reports: nausea, vomiting. 
Genitourinary:
Reports: no symptoms. 
Musculoskeletal:
Reports: no symptoms. 
 
Objective
Last 24 Hrs of Vital Signs/I&O
 Vital Signs
 
 
Date Time Temp Pulse Resp B/P B/P Pulse O2 O2 Flow FiO2
 
     Mean Ox Delivery Rate 
 
 0554 97.8 91 20 130/84  96 Room Air  
 
 2232 98.4 85 18 160/78  93 Room Air  
 
 1600       Room Air  
 
 1428 96.0 74 16 130/60  95   
 
 
 Intake & Output
 
 
  0800  0000  1600
 
Intake Total   0
 
Output Total   0
 
Balance   0
 
    
 
Intake, Oral   0
 
Number  1 0
 
Bowel   
 
Movements   
 
Output, Urine   0
 
 
 
 
Physical Exam
General Appearance: Alert, Oriented X3, Cooperative
Skin: ulcer on the sacrum
Skin Temp/Moisture Exam: Warm/Dry
Cardiovascular: Regular Rate, Normal S1, Normal S2, systolic murmur
Lungs: Clear to Auscultation, Normal Air Movement
Abdomen: Normal Bowel Sounds, Soft, No Tenderness, mild distension
Extremities: R hand 2 + pitting edema, bilateral feet with 2+ pitting edema
Current Medications:
 Current Medications
 
 
  Sig/Jose E Start time  Last
 
Medication Dose Route Stop Time Status Admin
 
Albuterol Sulfate 3 ML Q4H PRN  0200 AC 
 
  INH   
 
Amlodipine Besylate 10 MG DAILY  1000 AC 
 
  PO   0933
 
Cholecalciferol 1,000 IU BID  1000 AC 
 
  PO   212
 
Clonidine 0.3 MG Q12  1000 AC 
 
  PO   2122
 
Docusate Sodium 100 MG BID  2341 AC 
 
  PO   2123
 
Escitalopram Oxalate 10 MG 0800  0800 AC 
 
  PO   
 
Furosemide 20 MG DAILY  1050 AC 
 
  IV   0916
 
Glycerin 2 SPRAY Q2P PRN  1915 AC 
 
  PO   0457
 
Guaifenesin 10 ML Q6P PRN  0200 AC 
 
  PO   
 
Heparin Sodium  5,000 UNIT Q8  2200 AC 
 
(Porcine)  SC   1730
 
Hydralazine HCl 100 MG BID  2200 AC 
 
  PO   2122
 
Isosorbide  60 MG DAILY  1000 AC 
 
Mononitrate  PO   0917
 
Metoprolol Tartrate 50 MG DAILY  1000 AC 
 
  PO   0915
 
Mirtazapine 7.5 MG AT BEDTIME 03/15 2200 AC 
 
  PO   2123
 
Omeprazole 40 MG DAILY AC  0700 AC 
 
  PO   0540
 
Ondansetron HCl 4 MG Q6H  0710 AC 
 
  IV   0826
 
Oxycodone HCl 5 MG Q8P PRN  0615 AC 
 
  PO   2122
 
Patient Medication  1 ED ONE ONE  1515 TN 
 
Teaching  ED  1516  
 
Polyethylene Glycol 17 GM BID  1000 AC 
 
  PO   1022
 
Senna 187 MG AT BEDTIME  2345 AC 
 
  PO   2123
 
Spironolactone 50 MG DAILY  1000 AC 
 
  PO   0916
 
Tramadol HCl 50 MG DAILY  1000 AC 
 
  PO   1215
 
 
 
 
Assessment/Plan
Assessment:
Patient is a 77-year-old female with a PMH significant for CKD stage IV, CAD 
status post CABG, PVD status post angioplasty with stent, HTN, uterine and colon
cancer, liver lesions either primary or metastatic cancer, who presented to 
[facility] from rehabilitation due to worsening  poor by mouth intake, 
nausea and vomiting, worsening ascites and worsening renal function.  
 
Patient is persistently refusing all interventions.  I had a discussion with the
patient's son yesterday with her permission about her current condition.  I also
had a discussion with the patient's POA, Clare, who will come in for a meeting 
on 3/22 in the afternoon at around 2:30 PM to discuss goals of care.  In the 
meantime she suggested potentially getting Johnathan Mcgill MD involved the 
patient is very fond of, to see if she he is able to convince her to resume 
interventions including physical therapy and taking her regular medications.  I 
then spoke with Johnathan Mcgill MD whom will attempt to see the patient at some 
point this week, however he is back covering [facility] next week and will
likely have to see her then if not before.
 
#Edema of the right hand
Patient refused Doppler ultrasound of the right upper extremity.  Right hand 
edema persists
-Will encourage R hand elevation, Lasix, and SC heparin for DVT prophylaxis
 
#Liver masses
Preliminary pathology report shows poorly differentiated carcinoma, it has been 
sent to [place] for further workup
-Follow-up final pathology
 
#ascites, resolved with paracentesis
Patient with a large volume tap with liver biopsy 3/12/18.  She tolerated the 
procedure well.  
-Appears that a small volume of reaccumulation is occurring with mild abdominal 
distention from baseline post large volume tap with mild abdominal distention 
from baseline post large volume tap
 
#chronic pressure ulcer
This continues to improve
-Treat with DuoDERM and offloading
 
#Mild hyponatremia
Patient continues to refuse lab draws
 
#mild Protein calorie malnutrition
Patient continues to have poor by mouth intake
 
#Depression
Patient continues to refuse medications, sleeps for most of the day and refuses 
goals of care discussion
-Continue Lexapro 10 mg
 
#Physical deconditioning
Patient continues to refuse to work with physical therapy
-Continue to encourage to work with PT
 
 
Diet: Heary healthy diet
DVT prophylaxis:SC heparin, ALPS
Code status: full code
Problem List:
 1. Carcinoma of liver
 
 2. Pressure ulcer
 
Pain Ratin
Pain Location:
none
Pain Goal: Remain pain free
Pain Plan:
pain pathway
Tomorrow's Labs & Rationales:
cbc, bep, lfts
 
 
James Camp MD 18 1059:
Attending MD Review Statement
 
Attending Statement
Attending MD Statement: examined this patient, discuss w/resident/PA/NP, agreed 
w/resident/PA/NP, reviewed EMR data (avail)
Attending Assessment/Plan:
77F PMH PVD s/p angioplasty with stent, HTN, CAD s/p CABG, CKD stage 4, uterine 
and colon cancer admitted with intractable nausea and vomiting secondary to 
acute gastroenteritis leading to pre-renal azotemia/RAHUL, with recently found 
multiple liver masses suspicious for metastases, underwent paracentesis and 
liver mass biopsy on 3/12 without complication.
 
Slowly improving.  Feels fatigued and weak.
 
1. Pre-renal azotemia/RAHUL secondary to dehydration (resolved)
2. Acute gastroenteritis (resolved)
3. Liver metastases
4. Hypokalemia (resolved)
5. Hyponatremia (resolved)
6. 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
 
Plan
- Continue on general medicine
- Psychiatry consult for depression
- Monitor renal function 
- Follow GI and nephrology recommendations
- PT evaluation for discharge planning
- DVT PPx

## 2018-03-21 VITALS — DIASTOLIC BLOOD PRESSURE: 88 MMHG | SYSTOLIC BLOOD PRESSURE: 138 MMHG

## 2018-03-21 VITALS — DIASTOLIC BLOOD PRESSURE: 78 MMHG | SYSTOLIC BLOOD PRESSURE: 136 MMHG

## 2018-03-21 VITALS — SYSTOLIC BLOOD PRESSURE: 198 MMHG | DIASTOLIC BLOOD PRESSURE: 90 MMHG

## 2018-03-21 VITALS — DIASTOLIC BLOOD PRESSURE: 80 MMHG | SYSTOLIC BLOOD PRESSURE: 148 MMHG

## 2018-03-21 VITALS — SYSTOLIC BLOOD PRESSURE: 208 MMHG | DIASTOLIC BLOOD PRESSURE: 86 MMHG

## 2018-03-21 LAB
ABSOLUTE GRANULOCYTE CT: 12.8 /CUMM (ref 1.4–6.5)
ABSOLUTE GRANULOCYTE CT: 13.1 /CUMM (ref 1.4–6.5)
APTT BLD: 29 SEC (ref 25–37)
BASOPHILS # BLD: 0 /CUMM (ref 0–0.2)
BASOPHILS # BLD: 0 /CUMM (ref 0–0.2)
BASOPHILS NFR BLD: 0 % (ref 0–2)
BASOPHILS NFR BLD: 0 % (ref 0–2)
EOSINOPHIL # BLD: 0 /CUMM (ref 0–0.7)
EOSINOPHIL # BLD: 0 /CUMM (ref 0–0.7)
EOSINOPHIL NFR BLD: 0 % (ref 0–5)
EOSINOPHIL NFR BLD: 0 % (ref 0–5)
ERYTHROCYTE [DISTWIDTH] IN BLOOD BY AUTOMATED COUNT: 15.4 % (ref 11.5–14.5)
ERYTHROCYTE [DISTWIDTH] IN BLOOD BY AUTOMATED COUNT: 15.5 % (ref 11.5–14.5)
GRANULOCYTES NFR BLD: 91.8 % (ref 42.2–75.2)
GRANULOCYTES NFR BLD: 94.4 % (ref 42.2–75.2)
HCT VFR BLD CALC: 28.8 % (ref 37–47)
HCT VFR BLD CALC: 31.6 % (ref 37–47)
LYMPHOCYTES # BLD: 0.4 /CUMM (ref 1.2–3.4)
LYMPHOCYTES # BLD: 0.7 /CUMM (ref 1.2–3.4)
MCH RBC QN AUTO: 29.4 PG (ref 27–31)
MCH RBC QN AUTO: 29.9 PG (ref 27–31)
MCHC RBC AUTO-ENTMCNC: 33.8 G/DL (ref 33–37)
MCHC RBC AUTO-ENTMCNC: 34.1 G/DL (ref 33–37)
MCV RBC AUTO: 86.9 FL (ref 81–99)
MCV RBC AUTO: 87.6 FL (ref 81–99)
MONOCYTES # BLD: 0.3 /CUMM (ref 0.1–0.6)
MONOCYTES # BLD: 0.5 /CUMM (ref 0.1–0.6)
PLATELET # BLD: 125 /CUMM (ref 130–400)
PLATELET # BLD: 133 /CUMM (ref 130–400)
PMV BLD AUTO: 7.9 FL (ref 7.4–10.4)
PMV BLD AUTO: 8.2 FL (ref 7.4–10.4)
PROTHROMBIN TIME: 11.6 SEC (ref 9.4–12.5)
RED BLOOD CELL CT: 3.29 /CUMM (ref 4.2–5.4)
RED BLOOD CELL CT: 3.63 /CUMM (ref 4.2–5.4)
WBC # BLD AUTO: 13.5 /CUMM (ref 4.8–10.8)
WBC # BLD AUTO: 14.2 /CUMM (ref 4.8–10.8)

## 2018-03-21 NOTE — EVENT NOTE
**See Addendum**
Event Note
Event Note:
I just spoke with neurologist Dr. Wayne Dodd, who was consulted immediately 
after the rapid response from the general medical floor with the patient.  After
discussing the history and current condition of the patient, he suggested to get
a CAT scan of the head whenever the patient is more stable, and since the 
patient is now DNR/DNI and talks are being held about possible comfort measures/
hospice soon, he requested to be called once the CAT scan of the head is done or
he would stop by the patient tomorrow if there is no acute neurological process 
going on.
----------------
The patient is an hypertensive urgency, with blood pressure slowly lowering 
down.  Will probably get a CAT scan of the head soon.  We'll inform neurologist 
as soon as we get results of the CAT scan of head, or if any further changes in 
her neurological status.
---------------

## 2018-03-21 NOTE — CONS- CRCU
Zoe PINTO,Gerhard 03/21/18 1632:
General Information and HPI
 
Consulting Request
Date of Consult: 03/21/18
Requested By:
Dr Shivani Lipscomb
Reason for Consult:
Seizure-like activity, decreased responsiveness
Source of Information: EMS
Exam Limitations: unable to give history, poor historian
History of Present Illness:
77-year-old female with past medical history of hypertension, hyperlipidemia, CK
D4, CAD status post triple vessel CABG, sigmoid colon cancer status post 
resection in 1970s, uterine cancer status post hysterectomy in 1970s, PVD, 
carotid endarterectomy over 10 years ago with recent angiography done in 2014, 
chronic back pain, who presented from [facility] for worsening ascites, AK
I, and new mass noted on MRI.
 
She was being treated in the general medical floor for multiple medical issues, 
including HPI, liver mass status post liver biopsy on 03/12/2018, diagnostic and
therapeutic paracentesis of 8.1 L 1 03/07/2018, chronic normocytic anemia status
post 1 unit of blood transfusion, urinary tract infection with culture positive 
for Klebsiella pneumoniae treated with ceftriaxone, chronic pressure ulcer, and 
protein calorie malnutrition. According to oncologist's note today, her liver 
pathology preliminary results is suggesting UGI and pancreaticobiliary as 
potential etiology of the metastasis, and given her overall prognosis being poor
, it would be difficult treatment, and hospice would be reasonable if she does 
not wish to pursue treatment.
 
She had been refusing her oral medications recently at [facility] as well,
but was taking it in the [facility] floor. However, she recently (more since 3/18/
18) had started refusing all medications, investigations, oral intake, and 
earlier today, was found to be less responsive than usual, and with a question 
of seizure-like activity had a rapid response called following which, she was 
transferred to the critical care unit. 
 
Her temperature this morning was 96.2 hypothermia at 0924 hrs. today, with 
tachycardia at 102, respiration 22, 136/78 blood pressure, and SPO2 97% with O2 
@ 2L/min.
 
Her blood pressure was found to be to wait over 86, pulse 81, temperature 96.5, 
respiration 22 and pulse oximetry 100% on 2 L oxygen at 4 PM today during the 
rapid response, following which she received 5 units of IV metoprolol.
 
 
Allergies/Medications
Allergies:
Coded Allergies:
Sulfa (Sulfonamide Antibiotics) (Severe, HIVES 11/20/16)
 
Home Med List:
Albuterol Sulfate 0.63 MG/3 ML VIAL.NEB   1 Vial INH/SOL 4 TIMES/DAY PRN COPD  (
Reported)
Amlodipine Besylate 10 MG TABLET   1 TAB PO DAILY HTN  (Reported)
Aspirin (Aspirin*) 81 MG TAB.CHEW   1 TAB PO DAILY HEART HEALTH  (Reported)
Cholecalciferol (Vitamin D3) (Vitamin D3) 2,000 UNIT CAPSULE   1 CAP PO BID 
SUPPLEMENT  (Reported)
Clonidine HCl 0.3 MG TABLET   1 TAB PO Q12 HTN  (Reported)
Guaifenesin/Dextromethorphan (Robitussin Cough-Chest Dm Liq) 100 MG-5 MG/5 ML 
LIQUID   10 ML PO Q4 PRN COUGH  (Reported)
Hydralazine HCl 25 MG TABLET   3 TAB PO TID HTN  (Reported)
Isosorbide Mononitrate (Isosorbide Mononitrate ER) 60 MG TAB.ER.24H   1 TAB PO 
DAILY HTN  (Reported)
     HOLD FOR SBP<100MMGH
Metoprolol Tartrate (Lopressor) 50 MG TABLET   1 TAB PO DAILY HTN  (Reported)
Omega-3 Fatty Acids/Fish Oil (Fish Oil 1,000 MG Capsule) 340 MG-1,000 MG CAPSULE
  2 CAP PO DAILY SUPPLEMENT  (Reported)
Omeprazole 40 MG CAPSULE.DR   1 CAP PO DAILY ACID REFLUX  (Reported)
Ondansetron HCl (Zofran) 4 MG TABLET   1 TAB PO TID NAUSEA  (Reported)
Oxycodone HCl 5 MG TABLET   1 TAB PO Q8P PAIN MODERATE  (Reported)
Polyethylene Glycol 3350 (Miralax) 17 GRAM/DOSE POWDER   17 GM PO BID GI
Tramadol HCl 50 MG TABLET   1 TAB PO DAILY PAIN  (Reported)
 
Current Medications:
 Current Medications
 
 
  Sig/Jose E Start time  Last
 
Medication Dose Route Stop Time Status Admin
 
Albuterol Sulfate 3 ML Q4H PRN 03/07 0200 AC 
 
  INH   
 
Amlodipine Besylate 10 MG DAILY 03/07 1000 AC 03/18
 
  PO   0933
 
Ceftazidime 1,000 MG ONCE ONE 03/21 1145 DC 03/21
 
  IV 03/21 1146  1453
 
Cholecalciferol 1,000 IU BID 03/07 1000 AC 03/18
 
  PO   2122
 
Clonidine 0.3 MG Q12 03/07 1000 AC 03/18
 
  PO   2122
 
Dexamethasone 4 MG ONCE ONE 03/21 1600 DC 03/21
 
Dextrose/Water 50 ML IV 03/21 1630  1648
 
Dextrose/Sodium  1,000 ML Q6H 03/21 1100 DC 03/21
 
Chloride  IV   1453
 
Dextrose/Sodium  1,000 ML Q13H 03/21 0830 DC 03/21
 
Chloride  IV   0941
 
Docusate Sodium 100 MG BID 03/12 2341 AC 03/18
 
  PO   2123
 
Escitalopram Oxalate 10 MG 0800 03/19 0800 AC 
 
  PO   
 
Furosemide 20 MG DAILY 03/20 1308 DC 
 
  PO   
 
Glycerin 2 SPRAY Q2P PRN 03/11 1915 AC 03/12
 
  PO   0457
 
Guaifenesin 10 ML Q6P PRN 03/07 0200 AC 
 
  PO   
 
Heparin Sodium  5,000 UNIT Q8 03/07 2200 AC 03/21
 
(Porcine)  SC   1502
 
Hydralazine HCl 10 MG ONCE ONE 03/21 1615 DC 03/21
 
  IV 03/21 1616  1615
 
Hydralazine HCl 100 MG BID 03/09 2200 AC 03/18
 
  PO   2122
 
Isosorbide  60 MG DAILY 03/07 1000 AC 03/18
 
Mononitrate  PO   0917
 
Lidocaine 1 ML .STK-MED ONE 03/21 1331 DC 
 
  ID 03/21 1332  
 
Metoprolol Tartrate 5 MG ONCE ONE 03/21 1445 DC 03/21
 
  IV 03/21 1446  1456
 
Metoprolol Tartrate 50 MG DAILY 03/07 1000 AC 03/18
 
  PO   0915
 
Mirtazapine 7.5 MG AT BEDTIME 03/15 2200 AC 03/18
 
  PO   2123
 
Omeprazole 40 MG DAILY AC 03/07 0700 AC 03/19
 
  PO   0540
 
Ondansetron HCl 4 MG Q6PRN PRN 03/20 0900 AC 03/20
 
  PO   1326
 
Oxycodone HCl 5 MG Q8P PRN 03/14 0615 AC 03/18
 
  PO   2122
 
Polyethylene Glycol 17 GM BID 03/07 1000 AC 03/13
 
  PO   1022
 
Senna 187 MG AT BEDTIME 03/12 2345 AC 03/18
 
  PO   2123
 
Spironolactone 50 MG DAILY 03/16 1000 DC 03/18
 
  PO   0916
 
Tramadol HCl 50 MG DAILY 03/07 1000 AC 03/14
 
  PO   1215
 
Vancomycin HCl 1,000 MG 1400 03/21 1400 AC 03/21
 
Dextrose/Water 250 ML IV   1453
 
 
 
 
Review of Systems
 
Review of Systems
Constitutional:
Reports: no symptoms. 
EENTM:
Reports: no symptoms. 
Cardiovascular:
Reports: no symptoms. 
Respiratory:
Reports: no symptoms. 
GI:
Reports: see HPI. 
Genitourinary:
Reports: no symptoms. 
Musculoskeletal:
Reports: no symptoms. 
Skin:
Reports: no symptoms. 
Neurological/Psychological:
Reports: no symptoms. 
Hematologic/Endocrine:
Reports: no symptoms. 
All Other Systems: Reviewed and Negative
 
Past History
 
Travel History
Traveled to [place] past 21 day No
 
Medical History
Blood Transfusion Hx: Yes
Neurological: CVA
EENT: NONE
Cardiovascular: CAD, hypertension, hyperlipidemia, PVD
Respiratory: NONE
Gastrointestinal: NAUSEA VOMITING DIARRHEA
Hepatic: NONE
Renal: chronic kidney disease
Musculoskeletal: chronic back pain, degen joint disease, falls, gout
Psychiatric: NONE
Endocrine: thyroid disease
Blood Disorders: anemia
Cancer(s): basal cell carcinoma, colon/rectal cancer, endometrial cancer
GYN/Reproductive: NONE
 
Surgical History
Surgical History: CABG, hysterectomy, PARTIAL SIGMOIDECTOMY BACK SURGERY 
CAROTIDECTOMY LE STENT PLACEMENT
 
Family History
Relations & Conditions If Any:
MOTHER (Unknown - pt adolted).
FATHER (unknown - pt adopted).
 
 
Psychosocial History
Where Do You Live? Extended Care Facility
Who Do You Live With? self
Services at Home: None
Primary Language: English
Smoking Status: Former Smoker
ETOH Use: denies use
Living Will? yes
Power of /HCP? yes
Name of POA/HCP: Clare Alston- friend/POA
 
Functional Ability
ADLs
Independent: dressing, eating, toileting, bathing. 
Ambulation: cane
IADLs
Independent: shopping, housework, finances, food prep, telephone, transportation
, medication admin. 
 
Exam & Diagnostic Data
Last 24 Hrs of Vital Signs/I&O
 Vital Signs
 
 
Date Time Temp Pulse Resp B/P B/P Pulse O2 O2 Flow FiO2
 
     Mean Ox Delivery Rate 
 
03/21 1615    208/86     
 
03/21 1456  106  198/90     
 
03/21 1432  102  198/90     
 
03/21 1415 99.0 100 20 138/88  95   
 
03/21 0924 96.2 102 22 136/78  97   
 
03/21 0620 98.1 106 20 148/80  95 Room Air  
 
 
 Intake & Output
 
 
 03/21 1600 03/21 0800 03/21 0000
 
Intake Total 450  
 
Output Total 600  
 
Balance -150  
 
    
 
Intake,   
 
Number  2 
 
Bowel   
 
Movements   
 
Output, Urine 600  
 
 
 
Last 48 Hrs of Labs/Lio:
 Laboratory Tests
 
03/21/18 1700:
Sodium Cancelled, Potassium Cancelled, Chloride Cancelled, Carbon Dioxide 
Cancelled, Anion Gap Cancelled, BUN Cancelled, Creatinine Cancelled, BUN/
Creatinine Ratio Cancelled
 
03/21/18 1630:
Anion Gap 15, Estimated GFR 29  L, BUN/Creatinine Ratio 27.1  H, Lactic Acid 1.9
, Troponin I 0.02, Prolactin 10.6, PT 11.6, INR 1.06, APTT 29, CBC w Diff NO MAN
DIFF REQ, RBC 3.29  L, MCV 87.6, MCH 29.9, MCHC 34.1, RDW 15.4  H, MPV 7.9, Gran
% 91.8  H, Lymphocytes % 4.7  L, Monocytes % 3.5, Eosinophils % 0, Basophils % 0
, Absolute Granulocytes 13.1  H, Absolute Lymphocytes 0.7  L, Absolute Monocytes
0.5, Absolute Eosinophils 0, Absolute Basophils 0
 
03/21/18 1600:
pH 7.31  L, pCO2 30  L, pO2 251  H, HCO3 15  L, ABG O2 Sat (Measured) 98.0, P-50
(Temp Corrected) N, Carboxyhemoglobin 0.3  L, O2 Concentration % 5L, Temperature
97.9, O2 Delivery Method NC, Phlebotomy Draw Site RIGHT RADIAL
 
03/21/18 1345:
Lactic Acid Cancelled
 
03/21/18 1300:
Fluid WBC 1287  H, Fld Mesothelial Cells   , Fld Total RBCs Counted 82019  H
 
03/21/18 1300:
Lymphocytes 24, % Normal PMNs 32, Fluid Total Protein < 2.0, Fluid 
 
03/21/18 1046:
Lactic Acid 1.3
 
03/21/18 1040:
Urinalysis MOD  H, Urine Color YEL, Urine Clarity HAZY  H, Urine pH 6.0, Ur 
Specific Gravity 1.020, Urine Protein 100  H, Urine Ketones TRACE  H, Urine 
Nitrite NEG, Urine Bilirubin NEG@ICTO, Urine Urobilinogen 0.2, Ur Leukocyte 
Esterase LARGE  H, Ur Microscopic SEDIMENT EXAMINED, Urine RBC RARE, Urine WBC 
25-50  H, Ur Epithelial Cells FEW, Urine Bacteria FEW  H, Granular Casts RARE  H
, Urine Hemoglobin TRACE-INTACT, Urine Glucose NEG
 
03/21/18 0910:
Anion Gap 21  H, Estimated GFR 31  L, BUN/Creatinine Ratio 28.8  H, Total 
Bilirubin 1.0, Direct Bilirubin 1.0  H, AST 18, ALT 22, Alkaline Phosphatase 282
 H, Total Protein 5.3  L, Albumin 2.8  L, CBC w Diff NO MAN DIFF REQ, RBC 3.63  
L, MCV 86.9, MCH 29.4, MCHC 33.8, RDW 15.5  H, MPV 8.2, Gran % 94.4  H, 
Lymphocytes % 3.3  L, Monocytes % 2.3, Eosinophils % 0, Basophils % 0, Absolute 
Granulocytes 12.8  H, Absolute Lymphocytes 0.4  L, Absolute Monocytes 0.3, 
Absolute Eosinophils 0, Absolute Basophils 0
 
03/21/18 0755:
Sodium Cancelled, Potassium Cancelled, Chloride Cancelled, Carbon Dioxide 
Cancelled, Anion Gap Cancelled, BUN Cancelled, Creatinine Cancelled, BUN/
Creatinine Ratio Cancelled, CBC w Diff Cancelled, WBC Cancelled, RBC Cancelled, 
Hgb Cancelled, Hct Cancelled, MCV Cancelled, MCH Cancelled, MCHC Cancelled, RDW 
Cancelled, Plt Count Cancelled, MPV Cancelled
 
 
Assessment/Plan CRCU
Impression/Plan:
77-year-old female with past medical history of hypertension, hyperlipidemia, CK
D4, CAD status post triple vessel CABG, sigmoid colon cancer status post 
resection in 1970s, uterine cancer status post hysterectomy in 1970s, PVD, 
carotid endarterectomy over 10 years ago with recent angiography done in 2014, 
chronic back pain, who presented from [facility] for worsening ascites, AK
I, and new mass noted on MRI.
 
She was being treated in the general medical floor for multiple medical issues 
as mentioned below, and recently had started refusing all medications, 
investigations, oral intake, and earlier today, was found to be less responsive 
than usual, and with a question of seizure-like activity had a rapid response 
called following which, she was transferred to the critical care unit. 
 
She is currently being managed in the ICU for the following issues:
 
#Decreased responsiveness, rule out seizure
The patient had decreased responsiveness since past 3 days, possibly 
attributable to her decreased by mouth intake, but earlier this afternoon she 
was found having abnormal movement of her limbs, which lasted only a few seconds
, suggestive of seizure-like activity, but no new residual neurologic deficits.
* The patient was deemed unstable and transferred to the ICU
* CAT scan of the head was considered to rule out new metastasis to the brain or
other intracranial lesions, after the patient has stabilized
* Neurology consultation was placed, see even note.
* 4 mg of IV Decadron was given following
 
#Possible spontaneous bacterial peritonitis
Patient had worsening sinusitis on presentation, and given her hypothermia and 
worsening physical health, and urgent diagnostic paracentesis was done earlier 
today, which shows neutrophil count of 411, suggestive of a spontaneous 
bacterial peritonitis.
* The patient already is on vancomycin and ceftazidime which covers the usual 
causative agent of SBP.
* Will wait for the body fluid culture results
 
#Hypertensive urgency
She was found to be hypertensive at 198/98 in the general medical floor and a 
one-time dose of IV metoprolol 5 mg was given.  After the rapid response, her 
systolic blood pressure was still of 200 thus a one-time order of 10 mg IV 
hydralazine was given to lower the blood pressure to permissible range.
* Clonidine patch, while allowing permissive HTN
 
#Liver mass with ascites
Patient has a there are mass of unclear primary, has already underwent liver 
biopsy on 03/12/2018 and her pathology preliminary report is suggestive of 
adenocarcinoma of UGI/pancreaticobiliary origin, not finalized yet. With her 
existing medical conditions, history of cancers, and recent developments, the 
prognosis seems guarded, and her power of  has notified us that her CODE
STATUS is DNR/DNI, and we are having family/power of  meeting tomorrow 
to possibly discuss about goals of care which will include hospice care 
discussion as well.
 
#Protein calorie malnutrition
Primary due to malignancy and poor oral intake. Encouraging her as much as she 
desires.
 
#Other medical conditions:
-Chronic normocytic anemia, status post blood transfusion 1
-UTI with Klebsiella pneumoniae, status post treatment with IV ceftriaxone for 5
days
-Chronic pressure ulcer, wound care consult on board, being treated with Duoderm
and offloading
-RAHUL on CKD-3, Creatinine better than at presentation (1.7 today) but still 
above her recent baseline 0.8.
 
#Rodriguez placed today (3/21/18) in the ICU.
 
 
#Diet: Regular diet with 2 Gm Na restriction
#DVT ppx: SQ Heparin
#Code status: DNR/DNI as of today
 
 
 
Consult Acknowledgment
- Thank you for your consult request.
 
 
Rosendo PINTO,[facility] 03/21/18 9125:
Assessment/Plan CRCU
Other Findings/Comments:
Pt with multiple malignacy history now with poorly diff ca in the liver with sig
ascitis nwo with mulitple organ dysfunction with very poor performance status 
and pt not a candidate for any rx now with seizure and decompensated mental 
status
ISsues
Advanced GI Malignancy which is terminal and pt wished no sig intervention 
before
Very poor performance status with cachexia 
Sepsis with prob sbp 
Seizure and poor mental status which is contributing to her deterioration
Prob intracranial mets
Uncontrolled HTN
Other details as above
 
REC
COnt conservative rx given pt is dnr and dni
IVF
IV abx
HEAD ct
PRn iv hydrallazine
Clonidine patch
Dexa 8 mg bid for now 
Pt not a candidate for other agg intervention given terminal state and pt is now
dnr and dni
If vasopressors are needed use peripheral levo up to 10 mics and other rx will 
be medically futile and detrimental to the patients condition
IV ppi
IV keppra if seizure recurrs
PRog poor 
 
 
 
 
 
Consult Acknowledgment
- Thank you for your consult request.

## 2018-03-21 NOTE — EVENT NOTE
Event Note
Event Note:
S: Approximately 0800 patient was examined at bedside.  She was found to have 
altered mental status, below her baseline, she was arousable but unable to 
communicate verbally and not following commands well.
 
B: Patient has had a prolonged inpatient course, initially admitted with failed 
thrive, worsening ascites, and is currently treated for a UTI.  She has been 
refusing all lab draws, medications, imaging studies.  She was assessed by 
psychiatry and evaluated to be depressed.  She had known liver lesions and a 
history of cancer
 
AR: A stat set of vitals was taken, as well as EKG and CXR.  Vital signs at that
time were temperature 94 initially however when repeated rectally was 96.2, 
pulse 102, RR 22, , pulse ox 97% on room air.  Fingerstick glucose 134.  
On exam the patient was lethargic, arousable to her name, able to keep her eyes 
open but not able to communicate verbally.  This is significantly below her 
baseline.  She was able to move her upper and lower extremities however she was 
not responding to commands.  Pupils were equal round and reactive to light.  She
had no signs of tenderness to palpation of her abdomen, abdomen is mildly 
distended likely representing re-collection of ascitic fluid.  Stat CBC, BEP 
showed mild leukocytosis which have been present throughout her hospitalization,
BEP showed elevated anion gap, RAHUL.  CXR was undiagnosed is due to rotation.  UA
and urine culture were sent and blood cultures were also drawn at this time.  
She was sent for urgent diagnostic paracentesis.  IV access was obtained, 
patient had been refusing IV access previously, she was started on D5 half-
normal saline and empirically started on IV vancomycin and IV ceftazidime.  With
an elevated anion gap leukocytosis and tachycardia infection is her current 
working diagnosis with urinary source or SBP being top of our differential.  
While PE is also being considered with her tachycardia and hypercoagulable state
given malignancy, lack of compliance with DVT prophylaxis, and immobility she is
not significantly tachypnic with nomral 
 
I personally called the patient's POA, Clare Alston [phone], to obtain 
consents for diagnostic paracentesis, and update her on the patient's status.  
After the paracentesis I recheck her again to update her on the plan and see 
discuss the update of the patient's cancer diagnosis as discussed with me by Dr. Alin Cain.  At this time I brought up the patient's CODE STATUS is being full
code, Clare mentioned that she believed the patient have a living will 
expressing a DNR status however she was reluctant to change her CODE STATUS at 
this time.  She is still planning to come in tomorrow afternoon for a meeting 
CODE STATUS and goals of care.

## 2018-03-21 NOTE — TRANSFER OF CARE SUMMARY
Hospital Course
 
Course
Hospital Course:
Reason for transfer to ICU: Rapid decline in patient's condition including 
suspected infection and seizure.
 
HPI: Patient is a 77-year-old female with a PMH significant for CAD status post 
CABG, CKD, HTN, PVD status post angioplasty with stents, uterine and colon 
cancer who had a recent admission in January of this year for evaluation of new 
onset ascites and was ultimately diagnosed with multiple liver lesions by 
outpatient MRI, who presented to the [facility] ED from [facility] 
on 3/06/18 with recurrence of ascites, failure to thrive, RAHUL.  She had been 
refusing to eat while at [facility] claiming to not like the food and had 
overall poor by mouth intake including by mouth hydration.  
 
Interval events in the Floor: On the floor she was followed by nephrology, she 
follows with Johnathan Mcgill MD as an outpatient, and by GI, she follows with Dr. Jolly as an outpatient.  A diagnostic paracentesis was done and SBP was ruled
out at that time, with SAAG suggestive of transudate.  RAHUL resolved with IV 
fluid rehydration.  UA was indicative of UTI, she was incontinent of urine and 
she was treated with a 5 day course (3/9-3/13) of IV ceftriaxone.  When her RAHUL 
had resolved, we proceeded with a large volume paracentesis and liver lesion 
biopsy on 3/12/18.   Preliminary results were of poorly differentiated carcinoma
Oncology was consulted.  Based on discussions with Dr. Alin Cain who was in 
touch with the [facility] pathologist this is likely upper GI in origin.    
 
Of note throughout her stay the patient has been lethargic but easily arousable,
refusing medications, refusing labwork, refusing new IV lines, refusing to work 
with physical therapy, and complaining of nausea, nausea was ultimately 
controlled with scheduled IV Zofran.  She continued to have poor by mouth 
intake.  Beginning around 3/13/18 patient was refusing all medications and 
having an increasingly depressed mood, psychiatry was consulted who recommended 
starting Lexapro.
 
On the morning of 3/21/18 the patient was found to have altered mental status.  
She was more lethargic and although she was arousable with verbal stimuli she 
would only open her eyes and could not communicate verbally.  Initially her 
temperature was read as hypothermic to 94 however repeat rectal temperature 
showed 96.  Other vital signs were within normal limits.  Stat CBC, BEP, EKG, 
CXR were done.  Says are significant for mild elevation in WBC and elevated 
anion gap.  She was sent for an urgent diagnostic paracentesis, blood cultures 
were drawn.  Empiric therapy with IV vancomycin and IV ceftazidime was ordered. 
Later in the afternoon she was noted to be hypertensive 198/98, she was given a 
one-time dose of IV 5 mg metoprolol.  A rapid response was  then called when the
patient was found seizing, she does not have a history of seizure.  Stat lab 
work was done and she was transferred to the ICU.  IV decadron was ordered.
 
NIPPV: No 
 
Antibiotics: IV Vancomycin, IV Ceftazidime
 
Catheters/ IV access: 2 Peripheral IV
 
Things to follow up: Neurology consult, EDC, BEP, troponin, lactic acid, urine 
cultures, blood cultures, ascitic fluid studies
 
DVT prophylaxis: Subcutaneous heparin has been ordered however the patient has 
been refusing
 
Consultants: 
Consult was placed to Dr. Black of neurology after the seizure
Dr. Alin Cain oncology
GI and nephrology has signed off
 
Code status: DNR/DNI
 
Family updated: Yes, POA updated, please contact Clare Lynnlenora [phone] 
with any significant changes of condition.  She has agreed to central line, 
pressors, and blood transfusions as needed.
Assessment/Plan:
This new onset of AMS kole with immunosuppressed state in the setting of 
malignancy is likely secondary to infection, with SBP and urinary tract being 
our top differential.  The seizure with current metastatic cancer raises concern
for brain mets.
 
Plan:
continue with emperic atibiotic therapy until a source is idetntified
once stabilized patient should go for a head CT
follow up neurology recommendations
patient was given a dose of decadron following the seizure

## 2018-03-21 NOTE — PN- ONCOLOGY
Subjective
Subjective:
She does not want to talk this morning.
Review of Systems:
Unable to obtain due to lack of cooperation.
 
Objective
Vital Signs and I&Os
Vital Signs
 
 
Date Time Temp Pulse Resp B/P B/P Pulse O2 O2 Flow FiO2
 
     Mean Ox Delivery Rate 
 
03/21 0620 98.1 106 20 148/80  95 Room Air  
 
03/20 1420 97.9 110 21 135/80  95   
 
 
 Intake & Output
 
 
 03/21 0800 03/21 0000 03/20 1600 03/20 0800 03/20 0000 03/19 1600
 
Intake Total   100 50  0
 
Output Total      0
 
Balance   100 50  0
 
       
 
Intake, IV    0  
 
Intake, Oral   100 50  0
 
Number 2  1 0 1 0
 
Bowel      
 
Movements      
 
Output, Urine      0
 
 
 
 
Physical Exam
General Appearance: no apparent distress, thin
Respiratory: normal breath sounds, chest non-tender, no respiratory distress, 
quiet respiration
Cardiovascular: regular rate/rhythm
Abdomen: normal bowel sounds, soft, non-tender
Extremities: RUE 2+ pitting edema
Neurologic/Psychiatric: limited due to cooperation issue
Skin: intact, warm/dry
Current Medications:
 Current Medications
 
 
  Sig/Jose E Start time  Last
 
Medication Dose Route Stop Time Status Admin
 
Albuterol Sulfate 3 ML Q4H PRN 03/07 0200 AC 
 
  INH   
 
Amlodipine Besylate 10 MG DAILY 03/07 1000 AC 03/18
 
  PO   0933
 
Cholecalciferol 1,000 IU BID 03/07 1000 AC 03/18
 
  PO   2122
 
Clonidine 0.3 MG Q12 03/07 1000 AC 03/18
 
  PO   2122
 
Docusate Sodium 100 MG BID 03/12 2341 AC 03/18
 
  PO   2123
 
Escitalopram Oxalate 10 MG 0800 03/19 0800 AC 
 
  PO   
 
Furosemide 20 MG DAILY 03/20 1308 AC 
 
  PO   
 
Furosemide 20 MG DAILY 03/16 1050 DC 03/18
 
  IV   0916
 
Glycerin 2 SPRAY Q2P PRN 03/11 1915 AC 03/12
 
  PO   0457
 
Guaifenesin 10 ML Q6P PRN 03/07 0200 AC 
 
  PO   
 
Heparin Sodium  5,000 UNIT Q8 03/07 2200 AC 03/12
 
(Porcine)  SC   1730
 
Hydralazine HCl 100 MG BID 03/09 2200 AC 03/18
 
  PO   2122
 
Isosorbide  60 MG DAILY 03/07 1000 AC 03/18
 
Mononitrate  PO   0917
 
Metoprolol Tartrate 50 MG DAILY 03/07 1000 AC 03/18
 
  PO   0915
 
Mirtazapine 7.5 MG AT BEDTIME 03/15 2200 AC 03/18
 
  PO   2123
 
Omeprazole 40 MG DAILY AC 03/07 0700 AC 03/19
 
  PO   0540
 
Ondansetron HCl 4 MG Q6PRN PRN 03/20 0900 AC 03/20
 
  PO   1326
 
Ondansetron HCl 4 MG Q6H 03/16 0710 DC 03/16
 
  IV   0826
 
Oxycodone HCl 5 MG Q8P PRN 03/14 0615 AC 03/18
 
  PO   2122
 
Polyethylene Glycol 17 GM BID 03/07 1000 AC 03/13
 
  PO   1022
 
Senna 187 MG AT BEDTIME 03/12 2345 AC 03/18
 
  PO   2123
 
Spironolactone 50 MG DAILY 03/16 1000 AC 03/18
 
  PO   0916
 
Tramadol HCl 50 MG DAILY 03/07 1000 AC 03/14
 
  PO   1215
 
 
 
 
Assessment/Plan
Assessment/Recommendations:
Ms. Perez is a 77-year-old female with HTN, HLD, CKD stage 4, CAD s/p triple 
vessel CABG (over 10 years ago), sigmoid colon cancer s/p resections (in the 
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 
over 10 years ago and most recent angiography done in 2014, and chronic back 
pain who presented from [facility] for increasing ascites, worsening CKD, 
and recently noted masses on MRI. Since admission, she has had liver biopsy with
paracentesis.  Ascitic fluid was negative on 3/8/2018.  Preliminary of the liver
biopsy on 3/12/2018 is malignant.  AFP was normal.  CEA is elevated at 11.7.  
Differentials include colon, uterine, HCC, and hepatobiliary disease. HCC is 
less likely given normal AFP. Treatment will be dependent on the pathology. 
 
Pathology result from [facility] suggest UGI and pancreatobiliary 
as potential etiology.  Cholangiocarcinoma is in the differential. Ideally, she 
would need upper endoscopy with ERCP for evaluation.  She should have CT of the 
chest with contrast to evaluate for metastatic disease.  She should have CA 19-9
checked for possible pancreatic carcinoma or cholangiocarcinoma.  Her PS and 
cooperation will be the main issues for her.  Her overall prognosis is poor with
pancreatic ro cholangiocarcinoma.  It will be difficult to treat her at her 
current condition. She will need PT and nutritional improvement before she would
be a candidate for therapy.  Hospice is reasonable if she does not wish to 
pursue treatment.
 
Metastatic carcinoma of likely GI/pancreatobililary origin:
-check CT chest
-check 
-discuss goals of care
-follow up as outpatient
-hospice candidate given deconditioning
 
Failure to thrive:
-PT evaluation
-nutrition evaluation
 
Please call [phone] with any questions or concerns.
Problem List:
 1. Carcinoma of liver
 
 2. History of colon cancer
 
 3. History of uterine cancer
 
 4. FTT (failure to thrive) in adult

## 2018-03-21 NOTE — PN- HOUSESTAFF
Pretty PINTO,Theodore 18 0730:
Subjective
Follow-up For:
Liver mets
failure to thrive
Subjective:
Patient was seen and examined at [facility].  She was lethargic with AMS, below her
baseline, and unable to comminucate. She opened her eyes to verbal stimuli but 
would not answer questions or follow commands.  ROS was unatainable.
 
Review of Systems
Constitutional:
Reports: see HPI. 
 
Objective
Last 24 Hrs of Vital Signs/I&O
 Vital Signs
 
 
Date Time Temp Pulse Resp B/P B/P Pulse O2 O2 Flow FiO2
 
     Mean Ox Delivery Rate 
 
 0620 98.1 106 20 148/80  95 Room Air  
 
 1420 97.9 110 21 135/80  95   
 
 
 Intake & Output
 
 
  0800  0000  1600
 
Intake Total   100
 
Output Total   
 
Balance   100
 
    
 
Intake, Oral   100
 
Number 2  1
 
Bowel   
 
Movements   
 
 
 
 
Physical Exam
General Appearance: lethargic and not able to follow commands, opens eyes to 
verbal stimuli, moves all 4 extremities in response to painful stimuli
Cardiovascular: tachycardic, systolic murmur
Lungs: diminished breath sounds secondary to not taking deep breaths
Abdomen: distended
Extremities: dependent pitting edema of the feet, R hand with 2+pitting edema 
extending up the forearm
Current Medications:
 Current Medications
 
 
  Sig/Jose E Start time  Last
 
Medication Dose Route Stop Time Status Admin
 
Albuterol Sulfate 3 ML Q4H PRN  0200 AC 
 
  INH   
 
Amlodipine Besylate 10 MG DAILY  1000 AC 
 
  PO   0933
 
Cholecalciferol 1,000 IU BID  1000 AC 
 
  PO   212
 
Clonidine 0.3 MG Q12  1000 AC 
 
  PO   2122
 
Docusate Sodium 100 MG BID  2341 AC 
 
  PO   2123
 
Escitalopram Oxalate 10 MG 0800  0800 AC 
 
  PO   
 
Furosemide 20 MG DAILY  1308 AC 
 
  PO   
 
Furosemide 20 MG DAILY  1050 DC 
 
  IV   0916
 
Glycerin 2 SPRAY Q2P PRN  1915 AC 
 
  PO   0457
 
Guaifenesin 10 ML Q6P PRN  0200 AC 
 
  PO   
 
Heparin Sodium  5,000 UNIT Q8  2200 AC 
 
(Porcine)  SC   1730
 
Hydralazine HCl 100 MG BID  2200 AC 
 
  PO   2122
 
Isosorbide  60 MG DAILY  1000 AC 
 
Mononitrate  PO   0917
 
Metoprolol Tartrate 50 MG DAILY  1000 AC 
 
  PO   0915
 
Mirtazapine 7.5 MG AT BEDTIME 03/15 2200 AC 
 
  PO   2123
 
Omeprazole 40 MG DAILY AC  0700 AC 
 
  PO   0540
 
Ondansetron HCl 4 MG Q6PRN PRN  0900 AC 
 
  PO   1326
 
Ondansetron HCl 4 MG Q6H  0710 DC 
 
  IV   0826
 
Oxycodone HCl 5 MG Q8P PRN  0615 AC 
 
  PO   2122
 
Polyethylene Glycol 17 GM BID  1000 AC 
 
  PO   1022
 
Senna 187 MG AT BEDTIME  2345 AC 
 
  PO   2123
 
Spironolactone 50 MG DAILY  1000 AC 
 
  PO   0916
 
Tramadol HCl 50 MG DAILY  1000 AC 
 
  PO   1215
 
 
 
 
Last 24 Hrs of Lab/Lio Results
Last 24 Hrs of Labs/Mics:
 Laboratory Tests
 
18 1700:
Sodium Cancelled, Potassium Cancelled, Chloride Cancelled, Carbon Dioxide 
Cancelled, Anion Gap Cancelled, BUN Cancelled, Creatinine Cancelled, BUN/
Creatinine Ratio Cancelled
 
18 1630:
Anion Gap 15, Estimated GFR 29  L, BUN/Creatinine Ratio 27.1  H, Lactic Acid 1.9
, Calcium 10.2, Phosphorus 3.4, Magnesium 1.6, Troponin I 0.02, Prolactin 10.6, 
PT 11.6, INR 1.06, APTT 29, CBC w Diff NO MAN DIFF REQ, RBC 3.29  L, MCV 87.6, 
MCH 29.9, MCHC 34.1, RDW 15.4  H, MPV 7.9, Gran % 91.8  H, Lymphocytes % 4.7  L,
Monocytes % 3.5, Eosinophils % 0, Basophils % 0, Absolute Granulocytes 13.1  H, 
Absolute Lymphocytes 0.7  L, Absolute Monocytes 0.5, Absolute Eosinophils 0, 
Absolute Basophils 0
 
18 1600:
pH 7.31  L, pCO2 30  L, pO2 251  H, HCO3 15  L, ABG O2 Sat (Measured) 98.0, P-50
(Temp Corrected) N, Carboxyhemoglobin 0.3  L, O2 Concentration % 5L, Temperature
97.9, O2 Delivery Method NC, Phlebotomy Draw Site RIGHT RADIAL
 
18 1345:
Lactic Acid Cancelled
 
18 1300:
Fluid WBC 1287  H, Fld Mesothelial Cells   , Fld Total RBCs Counted 41178  H
 
18 1300:
Lymphocytes 24, % Normal PMNs 32, Fluid Total Protein < 2.0, Fluid 
 
18 1046:
Lactic Acid 1.3
 
18 1040:
Urinalysis MOD  H, Urine Color YEL, Urine Clarity HAZY  H, Urine pH 6.0, Ur 
Specific Gravity 1.020, Urine Protein 100  H, Urine Ketones TRACE  H, Urine 
Nitrite NEG, Urine Bilirubin NEG@ICTO, Urine Urobilinogen 0.2, Ur Leukocyte 
Esterase LARGE  H, Ur Microscopic SEDIMENT EXAMINED, Urine RBC RARE, Urine WBC 
25-50  H, Ur Epithelial Cells FEW, Urine Bacteria FEW  H, Granular Casts RARE  H
, Urine Hemoglobin TRACE-INTACT, Urine Glucose NEG
 
18 0910:
Anion Gap 21  H, Estimated GFR 31  L, BUN/Creatinine Ratio 28.8  H, Total 
Bilirubin 1.0, Direct Bilirubin 1.0  H, AST 18, ALT 22, Alkaline Phosphatase 282
 H, Total Protein 5.3  L, Albumin 2.8  L, CBC w Diff NO MAN DIFF REQ, RBC 3.63  
L, MCV 86.9, MCH 29.4, MCHC 33.8, RDW 15.5  H, MPV 8.2, Gran % 94.4  H, 
Lymphocytes % 3.3  L, Monocytes % 2.3, Eosinophils % 0, Basophils % 0, Absolute 
Granulocytes 12.8  H, Absolute Lymphocytes 0.4  L, Absolute Monocytes 0.3, 
Absolute Eosinophils 0, Absolute Basophils 0
 
18 0755:
Sodium Cancelled, Potassium Cancelled, Chloride Cancelled, Carbon Dioxide 
Cancelled, Anion Gap Cancelled, BUN Cancelled, Creatinine Cancelled, BUN/
Creatinine Ratio Cancelled, CBC w Diff Cancelled, WBC Cancelled, RBC Cancelled, 
Hgb Cancelled, Hct Cancelled, MCV Cancelled, MCH Cancelled, MCHC Cancelled, RDW 
Cancelled, Plt Count Cancelled, MPV Cancelled
 Microbiology
 1605  UPPER RESP: Surveillance Culture - RECD
 1605  GI: Surveillance Culture - RECD
 1300  BODY FLUID: Body Fluid Culture - RECD
 1300  BODY FLUID: Gram Stain - RECD
 1048  BLOOD: Blood Culture - RECD
 1040  URINE ROUT: Urine Culture - RECD
 0954  BLOOD: Blood Culture - RECD
 
 
 
Assessment/Plan
Assessment:
Patient is a 77-year-old female with a PMH significant for CKD stage IV, CAD 
status post CABG, PVD status post angioplasty with stent, HTN, uterine and colon
cancer, liver lesions either primary or metastatic cancer, who presented to 
[facility] from rehabilitation due to worsening  poor by mouth intake, 
nausea and vomiting, worsening ascites and worsening renal function.  
 
Patient had altered mental status this morning, VS at that time were significant
for hypothermia, stat labs revealed anion gap acidosis and a UA indicative of 
UTI.  Patient went for diagnostic paracentesis.  When she returned she was in 
hypertensive urgency and could not take PO meds.  She was given a 1 time dose of
IV metoprolol 5 mg.  Soon after she had a witnessed seizure and was still 
hypertensive, a rapid response was called.  She was transferred to the ICU and 
her POA was informed.  See event notes for full details.
 
#UTI and possible SBP
Started emperically on IV ceftazidime and vancomycin
- follow up paracentesis results
- follow up blood, urine and ascitic fluid cultures
 
#Seizure
-Head CT to rule out brain mets and other pathology
-check prolactin
 
#Edema of the right hand
Patient refused Doppler ultrasound of the right upper extremity at the onset of 
this swelling
- if she shows signs suspicous of PE, consider CTA
 
#Liver masses
Per discussion with Dr. Ogden the pathology is showing these lesions are mets 
likely from the upper GI tract.
-when the patient is stable this may be worked up further with CA-19-9, chest CT
, however at this point given the patient's condition, a discussion about 
comfort measures or hospice would be appropriate
 
#ascites, resolved with paracentesis
Patient with a large volume tap with liver biopsy 3/12/18.  She tolerated the 
procedure well.  
-Appears that a small volume of reaccumulation is occurring with mild abdominal 
distention from baseline post large volume tap with mild abdominal distention 
from baseline post large volume tap
 
#chronic pressure ulcer
This continues to improve
-Treat with DuoDERM and offloading
 
Diet: Heary healthy diet
DVT prophylaxis:SC heparin, ALPS
Code status: DNR/DNI
Problem List:
 1. FTT (failure to thrive) in adult
 
 2. Carcinoma of liver
 
 3. Pressure ulcer
 
 4. Mass of multiple sites of liver
 
Pain Ratin
Pain Location:
none
Pain Goal: Remain pain free
Pain Plan:
pain pathway
Tomorrow's Labs & Rationales:
cbc, ICU bundle
 
 
Ruel PINTO,Shivani 18 1347:
Attending MD Review Statement
 
Attending Statement
Attending MD Statement: examined this patient, discuss w/resident/PA/NP, agreed 
w/resident/PA/NP, reviewed EMR data (avail), discussed with nursing, discussed 
with case mgmt, reviewed images
Attending Assessment/Plan:
57-year-old female fairly extensive past medical history including hypertension,
hyperlipidemia, coronary artery disease who has been here since  after 
she presented with ascites, RAHUL, UTI and was found to have a malignancy on liver
biopsy.  It's an adeno CA with the primary likely being pancreatic or 
cholangiocarcinoma as per oncology's note today.  Patient's main issues have 
been severely depressed mental status and non-cooperative behavior.  Today she 
is lethargic and was noted on labs that were drawn to have a leukocytosis with 
an anion gap acidosis and RAHUL with hypothermia on exam.  Possible sources of 
infection causing this RAHUL and anion gap acidosis include urinary given the 
positive UA and the possibility of SBP given her last tap showing a high SAAG 
and a low total protein.  At this point will get blood cultures, a diagnostic 
tap, chest x-ray, urine culture and after all those obtained will empirically 
start on IV vancomycin to cover for the possibility of MRSA and ceftaz  to cover
the gram negatives including Pseudomonas given that she's been here since  and she is immunosuppressed.  Her POA was informed and consented to the 
treatment.  The plan is that she will come in tomorrow for a family meeting to 
discuss further options.  Oncology is not optimistic about being able to treat 
given overall poor by mouth intake, depression and poor mobility status.

## 2018-03-21 NOTE — EVENT NOTE
Event Note
Event Note:
Rapid response was called thsi afternoon for patient who was found seizing by RN
when she went to evaluate her.  Patient had just previously recieved 5 mg dose 
of IV metoprolol slowly over 30 minutes for elevated blood pressure of 198/90. 
Patient had been refusing all her oral medications, food and PT for the past 
couple of days and this morning was found to be more lethargic than usual and 
hypothermic so she was worked up for sepsis. She was also sent for a therapeutic
tap to r/o SBP and blood was drawn for cultures. She was then started on IV 
antibiotics. She has no known history of seizures.
 
On evaluation patient was jerking in bed, seizure-like episode lasted only a few
seconds and resolved on its own.  Patient did not have any loss of urinary or 
bowel function and did not bite her tongue or drool.  Patient is cachetic, lying
on her left side, pupils slowly reactive and she was responsive to painful 
stimuli by opening eyes only. Chest clear to auscultation, RRR, abdomen non-
tender, pitting edema on both the right lower and right upper extremities only.
 
Vital signs were blood pressure of 200/90, heart rate was 75, respiratory rate 
16, Temp  97.9
 
 
Plan
Transfer patient to ICU
Follow-up ABG, CBC, BEP, EKG, Troponin, lactic acid
Continue antibiotics IV ceftaz and vancomycin for possible sepsis
Consider CT head to r/o new metastasis to the brain or other intracranial lesion
Control blood pressure per ICU team
Neurology eval
 
***Dr. Lipscomb in room during the rapid response and has discussed this patient 
with ICU attending Angelito Robbins MD. who recommends a dose of IV decadron x 1
 
 
***Patient's POA, Clare Alston [phone] a close friend of the patient, 
was contacted and informed of patient's transfer to the ICU.  She consented to 
have the patient admitted to the unit and agrees to have a central line, 
pressors, blood transfusions done as necessary, however she had also spoken with
the patient's co-POA who is also a close friend and they have decided to make 
the patient's CODE STATUS DNR/DNI at this time. Clare is planning to come in to
the hospital tomorrow for further goals of care discussion.
 
Clare also requests that any updates overnight be related to her at the number 
listed above.

## 2018-03-21 NOTE — ULTRASOUND REPORT
EXAMINATION:
PARACENTESIS
 
CLINICAL INFORMATION:
Ascites . Decreased responsiveness. Concern for spontaneous bacterial
peritonitis.
 
COMPARISON:
Several prior paracenteses, the most recent 03/12/2018
 
TECHNIQUE:
Direct ultrasound guidance using a 20-gauge spinal needle
 
FINDINGS:
Informed consent was obtained from the patient's power of  prior to
the procedure. During this process, the procedure alternatives were
explained, along with the intended outcome and benefits. The risks of the
procedure, as well as the risk of not doing the procedure, was discussed. The
patient's power of  was given the opportunity to ask questions
regarding the procedure. A consent form which documents this discussion was
placed in the medical record.
 
Ultrasound evaluation of the abdomen for ascites was performed. Moderate
amount of ascites is noted in the right upper quadrant. The site was marked.
A timeout procedure was performed. The area was prepped and draped in usual
sterile fashion.
 
Using standard interventional and sterile techniques, lidocaine was used to
anesthetize the region.  A 20-gauge spinal needle was advanced into the fluid
under direct sonographic visualization. Approximately 60 mL's of
blood-tinged, nonclotting fluid was aspirated. The needle was removed and
good hemostasis was achieved with manual pressure. Dermabond was placed.
 
The patient tolerated the procedure well. The patient was discharged from the
department in stable condition.
 
COMPLICATIONS:
None.
 
IMPRESSION:
Successful ultrasound-guided diagnostic paracentesis yielding 60 mL's of
fluid.

## 2018-03-21 NOTE — EVENT NOTE
Event Note
Event Note:
Rapid response was called after a seizure.   See rapid response note for full 
details.
 
Patient's POA, Clare Lynnlenora [phone], was contacted and informed of the 
situation.  She consented to have the patient admitted to the unit and agrees to
have a central line, pressors, blood transfusions be done as necessary however 
she had also spoken with the patient's co-POA who is also a close friend and 
they have decided to make the patient's CODE STATUS DNR/DNI at this time.  
Clare is still planning to come in to the hospital tomorrow for further goals 
of care discussion.
 
Clare also requests that any updates overnight be related to her at the number 
listed above.

## 2018-03-21 NOTE — RADIOLOGY REPORT
EXAMINATION:
XR PORTABLE CHEST
 
CLINICAL INFORMATION:
Labored breathing and hypothermic with altered mental status.
 
COMPARISON:
Chest x-ray 03/06/2018.
 
TECHNIQUE:
Portable frontal view of the chest was obtained.
 
FINDINGS:
Exam is not diagnostic secondary to significant patient rotation. The right
lung appears well aerated and the left lung is not diagnostically assessed.
There are median sternotomy wires. Cardiac silhouette not diagnostically
assessed. No acute osseous findings. Surgical clips project over the lower
neck.
 
IMPRESSION:
Nondiagnostic chest x-ray secondary to significant patient rotation. The left
lung is not diagnostically assessed and the right lung appears well-aerated.
Cardiac silhouette not diagnostically assessed. A repeat chest x-ray with the
patient in AP positioning is recommended.

## 2018-03-22 ENCOUNTER — HOSPITAL ENCOUNTER (INPATIENT)
Dept: HOSPITAL 68 - CRI | Age: 78
LOS: 7 days | DRG: 436 | End: 2018-03-29
Attending: INTERNAL MEDICINE | Admitting: INTERNAL MEDICINE
Payer: COMMERCIAL

## 2018-03-22 VITALS — SYSTOLIC BLOOD PRESSURE: 165 MMHG | DIASTOLIC BLOOD PRESSURE: 77 MMHG

## 2018-03-22 VITALS — SYSTOLIC BLOOD PRESSURE: 214 MMHG | DIASTOLIC BLOOD PRESSURE: 90 MMHG

## 2018-03-22 VITALS — SYSTOLIC BLOOD PRESSURE: 180 MMHG | DIASTOLIC BLOOD PRESSURE: 80 MMHG

## 2018-03-22 DIAGNOSIS — C79.31: ICD-10-CM

## 2018-03-22 DIAGNOSIS — E78.5: ICD-10-CM

## 2018-03-22 DIAGNOSIS — R56.9: ICD-10-CM

## 2018-03-22 DIAGNOSIS — E07.9: ICD-10-CM

## 2018-03-22 DIAGNOSIS — Z95.1: ICD-10-CM

## 2018-03-22 DIAGNOSIS — M19.90: ICD-10-CM

## 2018-03-22 DIAGNOSIS — Z85.828: ICD-10-CM

## 2018-03-22 DIAGNOSIS — Z85.038: ICD-10-CM

## 2018-03-22 DIAGNOSIS — Z51.5: ICD-10-CM

## 2018-03-22 DIAGNOSIS — N18.9: ICD-10-CM

## 2018-03-22 DIAGNOSIS — I73.9: ICD-10-CM

## 2018-03-22 DIAGNOSIS — M10.9: ICD-10-CM

## 2018-03-22 DIAGNOSIS — I12.9: ICD-10-CM

## 2018-03-22 DIAGNOSIS — Z85.42: ICD-10-CM

## 2018-03-22 DIAGNOSIS — C78.7: Primary | ICD-10-CM

## 2018-03-22 DIAGNOSIS — R62.7: ICD-10-CM

## 2018-03-22 DIAGNOSIS — Z86.73: ICD-10-CM

## 2018-03-22 DIAGNOSIS — I25.10: ICD-10-CM

## 2018-03-22 LAB
ABSOLUTE GRANULOCYTE CT: 12.7 /CUMM (ref 1.4–6.5)
BASOPHILS # BLD: 0 /CUMM (ref 0–0.2)
BASOPHILS NFR BLD: 0 % (ref 0–2)
EOSINOPHIL # BLD: 0 /CUMM (ref 0–0.7)
EOSINOPHIL NFR BLD: 0 % (ref 0–5)
ERYTHROCYTE [DISTWIDTH] IN BLOOD BY AUTOMATED COUNT: 15.3 % (ref 11.5–14.5)
GRANULOCYTES NFR BLD: 97.5 % (ref 42.2–75.2)
HCT VFR BLD CALC: 29.8 % (ref 37–47)
LYMPHOCYTES # BLD: 0.2 /CUMM (ref 1.2–3.4)
MCH RBC QN AUTO: 29.6 PG (ref 27–31)
MCHC RBC AUTO-ENTMCNC: 34 G/DL (ref 33–37)
MCV RBC AUTO: 87.1 FL (ref 81–99)
MONOCYTES # BLD: 0.2 /CUMM (ref 0.1–0.6)
PLATELET # BLD: 106 /CUMM (ref 130–400)
PMV BLD AUTO: 8.4 FL (ref 7.4–10.4)
RED BLOOD CELL CT: 3.42 /CUMM (ref 4.2–5.4)
WBC # BLD AUTO: 13 /CUMM (ref 4.8–10.8)

## 2018-03-22 NOTE — HISTORY & PHYSICAL
General Information and HPI
Chief Complaint:
admit to hospice
Source of Information: old records, friend
Exam Limitations: unable to give history, not alert/orientated
Associated Symptoms:
seizure, responsive only to painful stimuli, no oral intake
History of Present Illness:
Patient is a 77-year-old female with a PMH significant for CAD status post CABG,
CKD, HTN, PVD status post angioplasty with stents, uterine and colon cancer in 
1970's who had a recent admission in January of this year for evaluation of new 
onset ascites and was ultimately diagnosed with multiple liver lesions by 
outpatient MRI, who presented to the [facility] ED from [facility] 
on 3/06/18 with recurrence of ascites, failure to thrive, RAHUL.  She had been 
refusing to eat while at [facility] claiming to not like the food and had 
overall poor by mouth intake including by mouth hydration.  
 
Pt. had a large volume paracentesis and liver lesion biopsy on 3/12/18, with 
results of poorly differentiated carcinoma, likely upper GI in origin.  She has 
had significant deterioration in mental status, hypertensive urgency, developed 
seizure last carmelo, Head CT suggestive of brain mets.  Given deteriorating 
condition and very poor prognosis, her friends/POAs have agreed to hospice care 
after discussion with medical staff.
 
Allergies/Medications
Allergies:
Coded Allergies:
Sulfa (Sulfonamide Antibiotics) (Severe, HIVES 11/20/16)
 
 
Past History
 
Medical History
Neurological: CVA
EENT: NONE
Cardiovascular: CAD, hypertension, hyperlipidemia, PVD
Respiratory: NONE
Gastrointestinal: NAUSEA VOMITING DIARRHEA
Hepatic: NONE
Renal: chronic kidney disease
Musculoskeletal: chronic back pain, degen joint disease, falls, gout
Psychiatric: NONE
Endocrine: thyroid disease
Blood Disorders: anemia
Cancer(s): basal cell carcinoma, colon/rectal cancer, endometrial cancer
GYN/Reproductive: NONE
History of MRSA: No
History of VRE: No
History of CDIFF: No
 
Surgical History
Surgical History: CABG, hysterectomy, PARTIAL SIGMOIDECTOMY BACK SURGERY 
CAROTIDECTOMY LE STENT PLACEMENT
 
Past Family/Social History
Family History:
Unknown
Psychosocial History:
, 1 son from whom she is estranged.  2 girlfriends who share POA.
Functional Ability:
currently dependent
 
Review of Systems
 
Review of Systems
Constitutional:
Reports: see HPI. 
 
Exam & Diagnostic Data
Last 24 Hrs of Vital Signs/I&O
T-96.7, HR-96, RR-20, BP-165/77
 
Physical Exam
General Appearance NAD, lethargic with spontaneous cough and head movement., 
thin, ill-appearing
HEENT Atraumatic, Mucous Membr. moist/pink
Cardiovascular Regular Rate, Normal S1, Normal S2, murmur 3/6 systolic
Lungs Clear to Auscultation
Abdomen Soft, No Tenderness
Neurological unable to follow commands, does not spontaneously open eyes
Extremities edema to right hand, bilat feet. No mottling
Reproductive (FEMALE) mccullough catheter in place with yellow urine
Last 24 Hrs of Labs/Lio:
3/22/18 CBC with wbc 13, hgb 10.1, Hct 29.8, plt 106, gran 97.5
chem with bun 46, cr 1.5, alb 2.7
 
Diagnostic Data
Other Results
head CT 3/22/18:IMPRESSION:
1. No acute intracranial hemorrhage.
2. There are new areas of vasogenic edema in both frontal and occipital lobes
which, in a patient with history of liver metastases, are suspicious for
brain metastases.
3. Left sphenoid sinusitis.
4. Chronic opacification of left mastoid air cells; correlate for any
clinical symptoms/signs of active mastoiditis.
 
 
Assessment/Plan
Assessment:
77-year-old female with history of uterine and colon cancer in the 1970's, now 
with liver metastases from primary site likely GI in origin with seizure 
activity probably due to brain metastases.
Plan:
Admit to hospice.
Morphine 2mg IV every 2 hrs as needed for pain/dyspnea
Ativan 0.5mg IV every 4 hrs as needed for anxiety
Phenobarb 130mg IV q12hrs x 4 for seizures, then 60mg IV every 8hrs.
Scopolamine patch and Robinul as needed for secretions
Continue clonidine patch for htn
Discussed with Kain, nursing.

## 2018-03-22 NOTE — CT SCAN REPORT
EXAMINATION:
CT HEAD WITHOUT CONTRAST
 
CLINICAL INFORMATION:
Seizure-like activity. History of cancer and new liver metastases. Evaluate
for brain metastases and infarct.
 
COMPARISON:
11/22/2016
 
TECHNIQUE:
Contiguous axial imaging was performed from the skull base to vertex without
intravenous administration of contrast.
 
DLP:
863 mGy-cm
 
FINDINGS:
Brain parenchyma: Compared to 11/22/2016, there are new areas of vasogenic
edema in both frontal and both occipital lobes. These findings are suspicious
for cerebral metastases, which may be better detected and evaluated on a
contrast head CT or brain MRI performed without and with IV contrast.
Grey-white matter differentiation is preserved. No evidence of hemorrhage or
midline shift.
 
Vessels: Chronic atherosclerotic calcification of vertebral and cavernous
carotid arteries.
 
Cerebrospinal fluid spaces: Unremarkable. No hydrocephalus or extra-axial
fluid collections.
 
Cerebellum and brainstem: Unremarkable. The 4th ventricle is midline in
position.  The cerebellopontine angles are normal.
 
Calvarium and temporomandibular joints: There is hyperostosis frontalis
interna. Calvarium is intact. Chronic opacification of left mastoid air
cells. The TMJs are unremarkable.
 
Paranasal sinuses and orbits: Mucosal thickening and secretions of the left
sphenoid sinus. No acute intraorbital findings.
 
Other: No acute findings in the visualized extracranial soft tissues.
 
IMPRESSION:
1. No acute intracranial hemorrhage.
2. There are new areas of vasogenic edema in both frontal and occipital lobes
which, in a patient with history of liver metastases, are suspicious for
brain metastases.
3. Left sphenoid sinusitis.
4. Chronic opacification of left mastoid air cells; correlate for any
clinical symptoms/signs of active mastoiditis.

## 2018-03-22 NOTE — PN- PULMONARY
Subjective
HPI/Critical Care Issues:
DOing poorly
Unresponsive at times arousable
 
 
Objective
Current Medications:
 Current Medications
 
 
  Sig/Jose E Start time  Last
 
Medication Dose Route Stop Time Status Admin
 
Albuterol Sulfate 3 ML Q4H PRN 03/07 0200 AC 
 
  INH   
 
Amlodipine Besylate 10 MG DAILY 03/07 1000 AC 03/18
 
  PO   0933
 
Ceftazidime 1,000 MG ONCE ONE 03/21 1145 DC 03/21
 
  IV 03/21 1146  1453
 
Cholecalciferol 1,000 IU BID 03/07 1000 AC 03/18
 
  PO   2122
 
Clonidine 1 PAT Q168 03/28 1000 DC 
 
  TOP   
 
Clonidine 1 PAT Q168 03/21 2100 AC 03/21
 
  TOP   2201
 
Clonidine 0.3 MG Q12 03/07 1000 AC 03/18
 
  PO   2122
 
Dexamethasone 8 MG BID 03/22 1000 AC 
 
  IV   
 
Dexamethasone 4 MG .STK-MED ONE 03/21 1624 DC 
 
  IM 03/21 1625  
 
Dexamethasone 4 MG ONCE ONE 03/21 1600 DC 03/21
 
Dextrose/Water 50 ML IV 03/21 1630  1648
 
Dextrose/Sodium  1,000 ML Q13H 03/21 1815 AC 03/22
 
Chloride  IV   0151
 
Dextrose/Sodium  1,000 ML Q6H 03/21 1100 DC 03/21
 
Chloride  IV   1453
 
Dextrose/Sodium  1,000 ML Q13H 03/21 0830 DC 03/21
 
Chloride  IV   0941
 
Docusate Sodium 100 MG BID 03/12 2341 AC 03/18
 
  PO   2123
 
Escitalopram Oxalate 10 MG 0800 03/19 0800 AC 
 
  PO   
 
Furosemide 20 MG DAILY 03/20 1308 DC 
 
  PO   
 
Glycerin 2 SPRAY Q2P PRN 03/11 1915 AC 03/12
 
  PO   0457
 
Guaifenesin 10 ML Q6P PRN 03/07 0200 AC 
 
  PO   
 
Heparin Sodium  5,000 UNIT Q8 03/07 2200 AC 03/22
 
(Porcine)  SC   0650
 
Hydralazine HCl 10 MG TID 03/22 0830 AC 03/22
 
  IV   0830
 
Hydralazine HCl 10 MG ONCE ONE 03/22 0215 DC 03/22
 
  IV 03/22 0216  0220
 
Hydralazine HCl 10 MG ONCE ONE 03/21 1615 DC 03/21
 
  IV 03/21 1616  1615
 
Hydralazine HCl 100 MG BID 03/09 2200 DC 03/18
 
  PO   2122
 
Isosorbide  60 MG DAILY 03/07 1000 AC 03/18
 
Mononitrate  PO   0917
 
Lidocaine 1 ML .STK-MED ONE 03/21 1331 DC 
 
  ID 03/21 1332  
 
Lorazepam 2 MG .STK-MED ONE 03/21 1538 DC 
 
  IM 03/21 1539  
 
Metoprolol Tartrate 5 MG ONCE ONE 03/21 1445 DC 03/21
 
  IV 03/21 1446  1456
 
Metoprolol Tartrate 50 MG DAILY 03/07 1000 AC 03/18
 
  PO   0915
 
Mirtazapine 7.5 MG AT BEDTIME 03/15 2200 AC 03/18
 
  PO   2123
 
Omeprazole 40 MG DAILY AC 03/07 0700 AC 03/19
 
  PO   0540
 
Ondansetron HCl 4 MG Q6PRN PRN 03/20 0900 AC 03/20
 
  PO   1326
 
Oxycodone HCl 5 MG Q8P PRN 03/14 0615 AC 03/18
 
  PO   2122
 
Pantoprazole Sodium 40 MG DAILY 03/21 1810 AC 03/21
 
  IV   1919
 
Polyethylene Glycol 17 GM BID 03/07 1000 AC 03/13
 
  PO   1022
 
Senna 187 MG AT BEDTIME 03/12 2345 AC 03/18
 
  PO   2123
 
Spironolactone 50 MG DAILY 03/16 1000 DC 03/18
 
  PO   0916
 
Tramadol HCl 50 MG DAILY 03/07 1000 AC 03/14
 
  PO   1215
 
Vancomycin HCl 1,000 MG 1400 03/21 1400 AC 03/21
 
Dextrose/Water 250 ML IV   1453
 
 
 
 
Vital Signs & I&O
Last 24 Hrs of Vitals and I&O:
 Vital Signs
 
 
Date Time Temp Pulse Resp B/P B/P Pulse O2 O2 Flow FiO2
 
     Mean Ox Delivery Rate 
 
03/22 0830  92  210/106     
 
03/22 0600      100 Nasal 2.0L 
 
       Cannula  
 
03/22 0220 97.6 91 24 190/80     
 
03/22 0000 97.6 95 24 180/80  100 Nasal 2.0L 
 
       Cannula  
 
03/22 0000      100 Nasal 2.0L 
 
       Cannula  
 
03/22 0000      100 Nasal 2.0L 
 
       Cannula  
 
03/21 2201  90 20 190/90     
 
03/21 2000      100 Nasal 2.0L 
 
       Cannula  
 
03/21 1615    208/86     
 
03/21 1600      100 Nasal 2.0L 
 
       Cannula  
 
03/21 1600 96.5 81 22 208/86  100 Nasal 6.0L 
 
       Cannula  
 
03/21 1456  106  198/90     
 
03/21 1432  102  198/90     
 
03/21 1415 99.0 100 20 138/88  95   
 
03/21 0924 96.2 102 22 136/78  97   
 
 
 Intake & Output
 
 
 03/22 1600 03/22 0800 03/22 0000
 
Intake Total  596 575
 
Output Total  168 100
 
Balance  428 475
 
    
 
Intake, IV  596 575
 
Number   2
 
Bowel   
 
Movements   
 
Output, Urine  168 100
 
Patient  106 lb 98 lb
 
Weight   
 
Weight   Bed scale
 
Measurement   
 
Method   
 
 
 Laboratory Tests
 
 
 03/22 03/21
 
 0400 2150
 
Chemistry  
 
  Sodium (137 - 145 mmol/L) 139 
 
  Potassium (3.5 - 5.1 mmol/L) 3.8 
 
  Chloride (98 - 107 mmol/L) 107 
 
  Carbon Dioxide (22 - 30 mmol/L) 18  L 
 
  Anion Gap (5 - 16) 14 
 
  BUN (7 - 17 mg/dL) 46  H 
 
  Creatinine (0.5 - 1.0 mg/dL) 1.5  H 
 
  Estimated GFR (>60 ml/min) 34  L 
 
  Glucose (65 - 99 mg/dL) 221  H 
 
  Calcium (8.4 - 10.2 mg/dL) 10.0 
 
  Phosphorus (2.5 - 4.5 mg/dL) 3.1 
 
  Magnesium (1.6 - 2.3 mg/dL) 1.6 
 
  Total Bilirubin (0.2 - 1.3 mg/dL) 0.9 
 
  AST (14 - 36 U/L) 15 
 
  ALT (9 - 52 U/L) 21 
 
  Albumin (3.5 - 5.0 g/dL) 2.7  L 
 
Hematology  
 
  CBC w Diff MAN DIFF ORDERED 
 
  WBC (4.8 - 10.8 /CUMM) 13.0  H 
 
  RBC (4.20 - 5.40 /CUMM) 3.42  L 
 
  Hgb (12.0 - 16.0 G/DL) 10.1  L 
 
  Hct (37 - 47 %) 29.8  L 
 
  MCV (81.0 - 99.0 FL) 87.1 
 
  MCH (27.0 - 31.0 PG) 29.6 
 
  MCHC (33.0 - 37.0 G/DL) 34.0 
 
  RDW (11.5 - 14.5 %) 15.3  H 
 
  Plt Count (130 - 400 /CUMM) 106  L 
 
  MPV (7.4 - 10.4 FL) 8.4 
 
  Gran % (42.2 - 75.2 %) 97.5  H 
 
  Lymphocytes % (20.5 - 51.1 %) 1.3  L 
 
  Monocytes % (1.7 - 9.3 %) 1.2  L 
 
  Eosinophils % (0 - 5 %) 0 
 
  Basophils % (0.0 - 2.0 %) 0 
 
  Absolute Granulocytes (1.4 - 6.5 /CUMM) 12.7  H 
 
  Segmented Neutrophils (42.2 - 75.2 %) 99  H 
 
  Absolute Lymphocytes (1.2 - 3.4 /CUMM) 0.2  L 
 
  Lymphocytes (20.5 - 51.1 %) 1  L 
 
  Absolute Monocytes (0.10 - 0.60 /CUMM) 0.2 
 
  Absolute Eosinophils (0.0 - 0.7 /CUMM) 0 
 
  Absolute Basophils (0.0 - 0.2 /CUMM) 0 
 
  Platelet Estimate (ADEQUATE) ADEQUATE 
 
  Polychromasia 1+ 
 
  Poikilocytosis 2+ 
 
  Ovalocytes 1+ 
 
  [place] Cells 1+ 
 
  Elliptocytes FEW 
 
Other Body Source  
 
  Fld Total RBCs Counted (%) 100 
 
Urines  
 
  Urinalysis  MOD  H
 
  Urine Color (YEL,AMB,STR)  YEL
 
  Urine Clarity (CLEAR)  CLDY  H
 
  Urine pH (5.0 - 8.0)  6.0
 
  Ur Specific Gravity (1.001 - 1.035)  1.025
 
  Urine Protein (NEG,<30 MG/DL)  100  H
 
  Urine Ketones (NEG)  TRACE  H
 
  Urine Nitrite (NEG)  NEG
 
  Urine Bilirubin (NEG)  NEG@ICTO
 
  Urine Urobilinogen (0.1  -  1.0 EU/dl)  0.2
 
  Ur Leukocyte Esterase (NEG)  MOD  H
 
  Ur Microscopic  SEDIMENT EXAMINED
 
  Urine RBC (0 - 5 /HPF)  1-3
 
  Urine WBC (0 - 2 /HPF)  50-75  H
 
  Ur Epithelial Cells (NONE,FEW)  FEW
 
  Urine Bacteria (NEG/NONE)  FEW  H
 
  Micro UA Comment  WAXY CASTS  H
 
  Urine Hemoglobin (NEG)  NEG
 
  Urine Glucose (N MG/DL)  NEG
 
 
 
 
 03/21 03/21
 
 1700 1630
 
Chemistry  
 
  Sodium (137 - 145 mmol/L) Cancelled 140
 
  Potassium (3.5 - 5.1 mmol/L) Cancelled 3.9
 
  Chloride (98 - 107 mmol/L) Cancelled 107
 
  Carbon Dioxide (22 - 30 mmol/L) Cancelled 18  L
 
  Anion Gap (5 - 16) Cancelled 15
 
  BUN (7 - 17 mg/dL) Cancelled 46  H
 
  Creatinine (0.5 - 1.0 mg/dL) Cancelled 1.7  H
 
  Estimated GFR (>60 ml/min)  29  L
 
  BUN/Creatinine Ratio (7 - 25 %) Cancelled 27.1  H
 
  Lactic Acid (0.7 - 2.1 mmol/L)  1.9
 
  Calcium (8.4 - 10.2 mg/dL)  10.2
 
  Phosphorus (2.5 - 4.5 mg/dL)  3.4
 
  Magnesium (1.6 - 2.3 mg/dL)  1.6
 
  Troponin I (< 0.11 ng/ml)  0.02
 
  Prolactin (3.0 - 18.6 ng/mL)  10.6
 
Coagulation  
 
  PT (9.4 - 12.5 SEC)  11.6
 
  INR (0.90 - 1.19)  1.06
 
  APTT (25 - 37 SEC)  29
 
Hematology  
 
  CBC w Diff  NO MAN DIFF REQ
 
  WBC (4.8 - 10.8 /CUMM)  14.2  H
 
  RBC (4.20 - 5.40 /CUMM)  3.29  L
 
  Hgb (12.0 - 16.0 G/DL)  9.8  L
 
  Hct (37 - 47 %)  28.8  L
 
  MCV (81.0 - 99.0 FL)  87.6
 
  MCH (27.0 - 31.0 PG)  29.9
 
  MCHC (33.0 - 37.0 G/DL)  34.1
 
  RDW (11.5 - 14.5 %)  15.4  H
 
  Plt Count (130 - 400 /CUMM)  133
 
  MPV (7.4 - 10.4 FL)  7.9
 
  Gran % (42.2 - 75.2 %)  91.8  H
 
  Lymphocytes % (20.5 - 51.1 %)  4.7  L
 
  Monocytes % (1.7 - 9.3 %)  3.5
 
  Eosinophils % (0 - 5 %)  0
 
  Basophils % (0.0 - 2.0 %)  0
 
  Absolute Granulocytes (1.4 - 6.5 /CUMM)  13.1  H
 
  Absolute Lymphocytes (1.2 - 3.4 /CUMM)  0.7  L
 
  Absolute Monocytes (0.10 - 0.60 /CUMM)  0.5
 
  Absolute Eosinophils (0.0 - 0.7 /CUMM)  0
 
  Absolute Basophils (0.0 - 0.2 /CUMM)  0
 
 
 
 
 03/21 03/21 03/21 03/21 03/21
 
 1600 1345 1300 1300 1046
 
Blood Gas     
 
  pH (7.35 - 7.45 PH) 7.31  L    
 
  pCO2 (35 - 45 TORR) 30  L    
 
  pO2 (80 - 100 TORR) 251  H    
 
  HCO3 (21 - 28 MEQ/L) 15  L    
 
  ABG O2 Sat (Measured) (>96.0 %) 98.0    
 
  P-50 (Temp Corrected) N    
 
  Carboxyhemoglobin (1.5 - 5.0 %) 0.3  L    
 
  O2 Concentration % 5L    
 
  Temperature (97.0 - 100.0 FARH) 97.9    
 
  O2 Delivery Method NC    
 
Chemistry     
 
  Lactic Acid (0.7 - 2.1 mmol/L)  Cancelled   1.3
 
Hematology     
 
  Lymphocytes (%)    24 
 
  % Normal PMNs (%)    32 
 
Miscellaneous     
 
  Phlebotomy Draw Site RIGHT RADIAL    
 
Other Body Source     
 
  Fluid WBC (0 - 5 /CUMM)   1287  H  
 
  Fld Mesothelial Cells (%)       
 
  Fld Total RBCs Counted (0 /CUMM)   93892  H  
 
  Fluid Total Protein (g/dL)    < 2.0 
 
  Fluid LDH (U/L)    237 
 
 
 
 
 03/21 03/21
 
 1040 0910
 
Chemistry  
 
  Sodium (137 - 145 mmol/L)  142
 
  Potassium (3.5 - 5.1 mmol/L)  4.5
 
  Chloride (98 - 107 mmol/L)  109  H
 
  Carbon Dioxide (22 - 30 mmol/L)  12  L
 
  Anion Gap (5 - 16)  21  H
 
  BUN (7 - 17 mg/dL)  46  H
 
  Creatinine (0.5 - 1.0 mg/dL)  1.6  H
 
  Estimated GFR (>60 ml/min)  31  L
 
  BUN/Creatinine Ratio (7 - 25 %)  28.8  H
 
  Total Bilirubin (0.2 - 1.3 mg/dL)  1.0
 
  Direct Bilirubin (< 0.4 mg/dL)  1.0  H
 
  AST (14 - 36 U/L)  18
 
  ALT (9 - 52 U/L)  22
 
  Alkaline Phosphatase (<127 U/L)  282  H
 
  Total Protein (6.3 - 8.2 g/dL)  5.3  L
 
  Albumin (3.5 - 5.0 g/dL)  2.8  L
 
Hematology  
 
  CBC w Diff  NO MAN DIFF REQ
 
  WBC (4.8 - 10.8 /CUMM)  13.5  H
 
  RBC (4.20 - 5.40 /CUMM)  3.63  L
 
  Hgb (12.0 - 16.0 G/DL)  10.7  L
 
  Hct (37 - 47 %)  31.6  L
 
  MCV (81.0 - 99.0 FL)  86.9
 
  MCH (27.0 - 31.0 PG)  29.4
 
  MCHC (33.0 - 37.0 G/DL)  33.8
 
  RDW (11.5 - 14.5 %)  15.5  H
 
  Plt Count (130 - 400 /CUMM)  125  L
 
  MPV (7.4 - 10.4 FL)  8.2
 
  Gran % (42.2 - 75.2 %)  94.4  H
 
  Lymphocytes % (20.5 - 51.1 %)  3.3  L
 
  Monocytes % (1.7 - 9.3 %)  2.3
 
  Eosinophils % (0 - 5 %)  0
 
  Basophils % (0.0 - 2.0 %)  0
 
  Absolute Granulocytes (1.4 - 6.5 /CUMM)  12.8  H
 
  Absolute Lymphocytes (1.2 - 3.4 /CUMM)  0.4  L
 
  Absolute Monocytes (0.10 - 0.60 /CUMM)  0.3
 
  Absolute Eosinophils (0.0 - 0.7 /CUMM)  0
 
  Absolute Basophils (0.0 - 0.2 /CUMM)  0
 
Urines  
 
  Urinalysis MOD  H 
 
  Urine Color (YEL,AMB,STR) YEL 
 
  Urine Clarity (CLEAR) HAZY  H 
 
  Urine pH (5.0 - 8.0) 6.0 
 
  Ur Specific Gravity (1.001 - 1.035) 1.020 
 
  Urine Protein (NEG,<30 MG/DL) 100  H 
 
  Urine Ketones (NEG) TRACE  H 
 
  Urine Nitrite (NEG) NEG 
 
  Urine Bilirubin (NEG) NEG@ICTO 
 
  Urine Urobilinogen (0.1  -  1.0 EU/dl) 0.2 
 
  Ur Leukocyte Esterase (NEG) LARGE  H 
 
  Ur Microscopic SEDIMENT EXAMINED 
 
  Urine RBC (0 - 5 /HPF) RARE 
 
  Urine WBC (0 - 2 /HPF) 25-50  H 
 
  Ur Epithelial Cells (NONE,FEW) FEW 
 
  Urine Bacteria (NEG/NONE) FEW  H 
 
  Granular Casts (NONE /LPF) RARE  H 
 
  Urine Hemoglobin (NEG) TRACE-INTACT 
 
  Urine Glucose (N MG/DL) NEG 
 
 
 
 
 03/21
 
 0755
 
Chemistry 
 
  Sodium Cancelled
 
  Potassium Cancelled
 
  Chloride Cancelled
 
  Carbon Dioxide Cancelled
 
  Anion Gap Cancelled
 
  BUN Cancelled
 
  Creatinine Cancelled
 
  BUN/Creatinine Ratio Cancelled
 
Hematology 
 
  CBC w Diff Cancelled
 
  WBC Cancelled
 
  RBC Cancelled
 
  Hgb Cancelled
 
  Hct Cancelled
 
  MCV Cancelled
 
  MCH Cancelled
 
  MCHC Cancelled
 
  RDW Cancelled
 
  Plt Count Cancelled
 
  MPV Cancelled
 
 
 Microbiology
 
 
Date/Time Procedure - Status
 
Source Growth
 
03/21 2150 Urine Culture - RES
 
URINE ROUT 
 
03/21 1605 Surveillance Culture - RECD
 
UPPER RESP 
 
03/21 1605 Surveillance Culture - RECD
 
GI 
 
03/21 1300 Body Fluid Culture - RES
 
BODY FLUID 
 
03/21 1300 Gram Stain - RES
 
BODY FLUID 
 
03/21 1048 Blood Culture - RECD
 
BLOOD 
 
03/21 1040 Urine Culture - RES
 
URINE ROUT 
 
03/21 0954 Blood Culture - RECD
 
BLOOD 
 
 
 
 
Impression/Plan
 
Impression/Plan
Impression/Plan:
Ms. Perez is a 77-year-old female with HTN, HLD, CKD stage 4, CAD s/p triple 
vessel CABG (over 10 years ago), sigmoid colon cancer s/p resections (in the 
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 
over 10 years ago and most recent angiography done in 2014, and chronic back 
pain who presented from [facility] for increasing ascites, worsening CKD, 
and recently noted masses on MRI. Since admission, she has had liver biopsy with
paracentesis.  Ascitic fluid was negative on 3/8/2018.  Preliminary of the liver
biopsy on 3/12/2018 is malignant.  AFP was normal.  CEA is elevated at 11.7.  
Differentials include colon, uterine, HCC, and hepatobiliary disease. HCC is 
less likely given normal AFP. Pathology result from [facility] 
suggest UGI and pancreatobiliary as potential etiologies.  Cholangiocarcinoma is
in the differential. 
ISSUES
SInce yesterday she has had sig deterioration with seizures and now with poor 
mental status and her CT head is pending, prob intracranial mets
Advanced GI Malignancy which is terminal and pt wished no sig intervention 
before
Very poor performance status with cachexia 
Sepsis with prob sbp 
Seizure and poor mental status which is contributing to her deterioration
Prob intracranial mets
Uncontrolled HTN
Other details as above
 
REC
Due to worsening status and pt's prior wishes pt's POA did talk to me today over
ther phone and wished only comfort care for the patient
She will be coming in for a meeting today and pt will be eventually made hospice
after she arrives
Will make pt comfort care only with meds to help ease her suffering
Will use prn benzo for seizures, prn morphine for dyspnea 
DC all other meds

## 2018-03-22 NOTE — PN- RESIDENT CRCU
Subjective
HPI/CRCU Issues:
Patient is still in the ICU, due to seizure-like activity yesterday.
 
I followed up and examined the patient today.  She is resting comfortably in bed
, is still refusing food, medications, water orally. Low body temperatures 
recorded overnight, no other issues.
 
Of note, Dr Robbins spoke with Clare SALTER) over phone this morning who, 
together with the other POCONY Schmitt decided her to be on comfort measures
only and no further investigations to be done.
 
 
Objective
 
Vital Signs & I&O
Last 8 Hrs of Vitals and I&O:
Vital Signs
 
 
Date Time Temp Pulse Resp B/P B/P Pulse O2 O2 Flow FiO2
 
     Mean Ox Delivery Rate 
 
 1400 96.7        
 
 1045 94.9        
 
 0845  96  165/77     
 
 0830  92  210/106     
 
 0800 94.7 92 20 214/90  100 Nasal 2.0L 
 
       Cannula  
 
 0600      100 Nasal 2.0L 
 
       Cannula  
 
 0220 97.6 91 24 190/80     
 
 0000 97.6 95 24 180/80  100 Nasal 2.0L 
 
       Cannula  
 
 0000      100 Nasal 2.0L 
 
       Cannula  
 
 0000      100 Nasal 2.0L 
 
       Cannula  
 
 2201  90 20 190/90     
 
 2000      100 Nasal 2.0L 
 
       Cannula  
 
 
 
 
Exam
General Appearance: awake, cachetic, lethargic, thin
Other Physical Findings:
refused physical examination as yesterday
Rodriguez
   Site: urethra
   Date In: 18
Current Medications:
 Current Medications
 
 
  Sig/Jose E Start time  Last
 
Medication Dose Route Stop Time Status Admin
 
Acetaminophen 650 MG Q4P PRN  1015 DCD 
 
  KS   
 
Albuterol Sulfate 3 ML Q4H PRN  0200 DC 
 
  INH   
 
Amlodipine Besylate 10 MG DAILY  1000 DC 
 
  PO   0933
 
Bisacodyl 10 MG DAILY AS NEEDED PRN  1015 DCD 
 
  KS   
 
Cholecalciferol 1,000 IU BID  1000 DC 
 
  PO   2122
 
Clonidine 1 PAT Q168  1000 DC 
 
  TOP   
 
Clonidine 1 PAT Q168  2100 DCD 
 
  TOP   2201
 
Clonidine 0.3 MG Q12  1000 DC 
 
  PO   2122
 
Dexamethasone 8 MG BID  1000 DC 
 
  IV   
 
Dextrose/Sodium  1,000 ML Q13H  1815 DC 
 
Chloride  IV   0151
 
Docusate Sodium 100 MG BID  2341 DC 
 
  PO   2123
 
Escitalopram Oxalate 10 MG 0800  0800 DC 
 
  PO   
 
Glycerin 2 SPRAY Q2P PRN  1030 DCD 
 
  PO   
 
Glycerin 2 SPRAY Q2P PRN  1915 DC 
 
  PO   0457
 
Guaifenesin 10 ML Q6P PRN  0200 DC 
 
  PO   
 
Heparin Sodium  5,000 UNIT Q8  2200 DC 
 
(Porcine)  SC   0650
 
Hydralazine HCl 10 MG TID  0830 DC 
 
  IV   0830
 
Hydralazine HCl 10 MG ONCE ONE  0215 DC 
 
  IV  0216  0220
 
Hydralazine HCl 100 MG BID  2200 DC 
 
  PO   212
 
Isosorbide  60 MG DAILY  1000 DC 
 
Mononitrate  PO   0917
 
Lorazepam 1 MG Q4P PRN  1015 DCD 
 
  IV   
 
Metoprolol Tartrate 50 MG DAILY  1000 DC 
 
  PO   0915
 
Mirtazapine 7.5 MG AT BEDTIME 03/15 2200 DC 
 
  PO   2123
 
Morphine Sulfate 2 MG Q2P PRN  1015 DCD 
 
  IV   
 
Omeprazole 40 MG DAILY AC  0700 DC 
 
  PO   0540
 
Ondansetron HCl 4 MG Q6PRN PRN  0900 DC 
 
  PO   1326
 
Oxycodone HCl 5 MG Q8P PRN  0615 DC 
 
  PO   212
 
Pantoprazole Sodium 40 MG DAILY  1810 DC 
 
  IV   1919
 
Polyethylene Glycol 17 GM BID  1000 DC 
 
  PO   1022
 
Scopolamine HBr 1 PAT Q72  1015 DCD 
 
  TOP   
 
Senna 187 MG AT BEDTIME  2345 DC 
 
  PO   2123
 
Tramadol HCl 50 MG DAILY  1000 DC 
 
  PO   1215
 
Vancomycin HCl 1,000 MG 1400  1400 DC 
 
Dextrose/Water 250 ML IV   1453
 
 
 
 
Impression/Plan
 
Impression/Problem List
Impression:
77-year-old female with past medical history of hypertension, hyperlipidemia, CK
D4, CAD status post triple vessel CABG, sigmoid colon cancer status post 
resection in 1970s, uterine cancer status post hysterectomy in 1970s, PVD, 
carotid endarterectomy over 10 years ago with recent angiography done in 2014, 
chronic back pain, who presented from [facility] for worsening ascites, AK
I, and new mass noted on MRI.
 
She was being treated in the general medical floor for multiple medical issues 
as mentioned below, and recently had started refusing all medications, 
investigations, oral intake, and earlier today, was found to be less responsive 
than usual, and with a question of seizure-like activity had a rapid response 
called following which, she was transferred to the critical care unit. 
 
She is currently being managed in the ICU for the following issues:
 
#Decreased responsiveness, rule out seizure
The patient had decreased responsiveness since past 3 days, possibly 
attributable to her decreased by mouth intake, but yesterday she was found 
having abnormal movement of her limbs, which lasted only a few seconds, 
suggestive of seizure-like activity, but no new residual neurologic deficits. 
CAT Scan of the head showed no acute stroke but brain edema and a likelihood of 
brain mets.
 
#Possible spontaneous bacterial peritonitis
Patient had worsening sinusitis on presentation, and given her hypothermia and 
worsening physical health, and urgent diagnostic paracentesis was done earlier 
today, which shows neutrophil count of 411, suggestive of a spontaneous 
bacterial peritonitis. The patient already is on vancomycin and ceftazidime 
which covers the usual causative agent of SBP.
 
#Hypertensive urgency
She was found to be hypertensive at 198/98 in the general medical floor and a 
one-time dose of IV metoprolol 5 mg was given.  After the rapid response, her 
systolic blood pressure was still of 200 thus a one-time order of 10 mg IV 
hydralazine was given to lower the blood pressure to permissible range. 
Clonidine patch, while allowing permissive HTN has been ordered.
 
#Liver mass with ascites
Patient has a there are mass of unclear primary, has already underwent liver 
biopsy on 2018 and her pathology preliminary report is suggestive of 
adenocarcinoma of UGI/pancreaticobiliary origin, not finalized yet. With her 
existing medical conditions, history of cancers, and recent developments, the 
prognosis seems guarded, and her power of  has notified us that her CODE
STATUS is DNR/DNI.
 
#Plan of care: With the issues mentioned above, that the prognosis of the 
patient is very guarded, and she probably would not be able to tolerate 
aggressive therapy against the cancer at this point, a power of  meeting
was held today to discuss about goals of care including hospice care discussion 
as well. After discussion, both the power of attorneys decided to make her as 
comfortable as possible by giving her hospice care.  This was related to the 
 and the hospice nurse, and after hospice consultation, the patient 
was discharged to in-hospital hospice care.
 
#Comfort acre/hospice medications:
Benzos for agitation, seizure, anxiety; Morphine for pain, dyspnea; Suppository 
for constipation; Scopolamine patch for excessive sectetions; Clonidine patch 
for HTN.
 
#Protein calorie malnutrition
Primary due to malignancy and poor oral intake. Comfort feeding only.
 
#Other medical conditions:
-Chronic normocytic anemia, status post blood transfusion 1
-UTI with Klebsiella pneumoniae, status post treatment with IV ceftriaxone for 5
days
-Chronic pressure ulcer, wound care consult on board, being treated with Duoderm
and offloading
-RAHUL on CKD-3, Creatinine better than at presentation (1.5 today) but still 
above her recent baseline 0.8.
 
#Rodriguez placed on 3/21/18 in the ICU.
 
 
#Diet: Regular diet with 2 Gm Na restriction.
#DVT ppx: DC'ed SQ Heparin (Comfort measures)
#Code status: DNR/DNI--->Comfort measures only--->HOSPICE CARE
 
 
 
Problem List:
 1. Hospice care
 
Pain Ratin
Tomorrow's Labs & Rationales:
-
 
Plan
DVT/Prophylaxis: none, hospice care
Code Status: Full Code

## 2018-03-22 NOTE — PN- ONCOLOGY
Subjective
Subjective:
She is unable to communicate this morning.  She is arousable.  She was 
transferred to the ICU after found to have possible seizure, unresponsiveness, 
and hypertension.
Review of Systems:
Unable to obtain due to unresponsiveness and clinical status.
 
Objective
Vital Signs and I&Os
Vital Signs
 
 
Date Time Temp Pulse Resp B/P B/P Pulse O2 O2 Flow FiO2
 
     Mean Ox Delivery Rate 
 
03/22 0220 97.6 91 24 190/80     
 
03/22 0000 97.6 95 24 180/80  100 Nasal 2.0L 
 
       Cannula  
 
03/22 0000      100 Nasal 2.0L 
 
       Cannula  
 
03/21 2201  90 20 190/90     
 
03/21 2000      100 Nasal 2.0L 
 
       Cannula  
 
03/21 1615    208/86     
 
03/21 1600      100 Nasal 2.0L 
 
       Cannula  
 
03/21 1600 96.5 81 22 208/86  100 Nasal 6.0L 
 
       Cannula  
 
03/21 1456  106  198/90     
 
03/21 1432  102  198/90     
 
03/21 1415 99.0 100 20 138/88  95   
 
03/21 0924 96.2 102 22 136/78  97   
 
 
 Intake & Output
 
 
 03/22 1600 03/22 0800 03/22 0000 03/21 1600 03/21 0800 03/21 0000
 
Intake Total  596 575 450  
 
Output Total  168 100 600  
 
Balance  428 475 -150  
 
       
 
Intake, IV  596 575 450  
 
Number   2  2 
 
Bowel      
 
Movements      
 
Output, Urine  168 100 600  
 
Patient  48.081 kg 44.452 kg   
 
Weight      
 
Weight   Bed scale   
 
Measurement      
 
Method      
 
 
 
 
Physical Exam
General Appearance: no apparent distress, cachetic, thin
Respiratory: normal breath sounds, chest non-tender, no respiratory distress
Cardiovascular: regular rate/rhythm
Abdomen: normal bowel sounds, soft, no organomegaly
Extremities: no edema, RUE edema
Neurologic/Psychiatric: disoriented x 3
Skin: warm/dry
Current Medications:
 Current Medications
 
 
  Sig/Jose E Start time  Last
 
Medication Dose Route Stop Time Status Admin
 
Albuterol Sulfate 3 ML Q4H PRN 03/07 0200 AC 
 
  INH   
 
Amlodipine Besylate 10 MG DAILY 03/07 1000 AC 03/18
 
  PO   0933
 
Ceftazidime 1,000 MG ONCE ONE 03/21 1145 DC 03/21
 
  IV 03/21 1146  1453
 
Cholecalciferol 1,000 IU BID 03/07 1000 AC 03/18
 
  PO   2122
 
Clonidine 1 PAT Q168 03/28 1000 DC 
 
  TOP   
 
Clonidine 1 PAT Q168 03/21 2100 AC 03/21
 
  TOP   2201
 
Clonidine 0.3 MG Q12 03/07 1000 AC 03/18
 
  PO   2122
 
Dexamethasone 8 MG BID 03/22 1000 AC 
 
  IV   
 
Dexamethasone 4 MG .STK-MED ONE 03/21 1624 DC 
 
  IM 03/21 1625  
 
Dexamethasone 4 MG ONCE ONE 03/21 1600 DC 03/21
 
Dextrose/Water 50 ML IV 03/21 1630  1648
 
Dextrose/Sodium  1,000 ML Q13H 03/21 1815 AC 03/22
 
Chloride  IV   0151
 
Dextrose/Sodium  1,000 ML Q6H 03/21 1100 DC 03/21
 
Chloride  IV   1453
 
Dextrose/Sodium  1,000 ML Q13H 03/21 0830 DC 03/21
 
Chloride  IV   0941
 
Docusate Sodium 100 MG BID 03/12 2341 AC 03/18
 
  PO   2123
 
Escitalopram Oxalate 10 MG 0800 03/19 0800 AC 
 
  PO   
 
Furosemide 20 MG DAILY 03/20 1308 DC 
 
  PO   
 
Glycerin 2 SPRAY Q2P PRN 03/11 1915 AC 03/12
 
  PO   0457
 
Guaifenesin 10 ML Q6P PRN 03/07 0200 AC 
 
  PO   
 
Heparin Sodium  5,000 UNIT Q8 03/07 2200 AC 03/22
 
(Porcine)  SC   0650
 
Hydralazine HCl 10 MG TID 03/22 0830 AC 
 
  IV   
 
Hydralazine HCl 10 MG ONCE ONE 03/22 0215 DC 03/22
 
  IV 03/22 0216  0220
 
Hydralazine HCl 10 MG ONCE ONE 03/21 1615 DC 03/21
 
  IV 03/21 1616  1615
 
Hydralazine HCl 100 MG BID 03/09 2200 AC 03/18
 
  PO   2122
 
Isosorbide  60 MG DAILY 03/07 1000 AC 03/18
 
Mononitrate  PO   0917
 
Lidocaine 1 ML .STK-MED ONE 03/21 1331 DC 
 
  ID 03/21 1332  
 
Lorazepam 2 MG .STK-MED ONE 03/21 1538 DC 
 
  IM 03/21 1539  
 
Metoprolol Tartrate 5 MG ONCE ONE 03/21 1445 DC 03/21
 
  IV 03/21 1446  1456
 
Metoprolol Tartrate 50 MG DAILY 03/07 1000 AC 03/18
 
  PO   0915
 
Mirtazapine 7.5 MG AT BEDTIME 03/15 2200 AC 03/18
 
  PO   2123
 
Omeprazole 40 MG DAILY AC 03/07 0700 AC 03/19
 
  PO   0540
 
Ondansetron HCl 4 MG Q6PRN PRN 03/20 0900 AC 03/20
 
  PO   1326
 
Oxycodone HCl 5 MG Q8P PRN 03/14 0615 AC 03/18
 
  PO   2122
 
Pantoprazole Sodium 40 MG DAILY 03/21 1810 AC 03/21
 
  IV   1919
 
Polyethylene Glycol 17 GM BID 03/07 1000 AC 03/13
 
  PO   1022
 
Senna 187 MG AT BEDTIME 03/12 2345 AC 03/18
 
  PO   2123
 
Spironolactone 50 MG DAILY 03/16 1000 DC 03/18
 
  PO   0916
 
Tramadol HCl 50 MG DAILY 03/07 1000 AC 03/14
 
  PO   1215
 
Vancomycin HCl 1,000 MG 1400 03/21 1400 AC 03/21
 
Dextrose/Water 250 ML IV   1453
 
 
 
 
Results
Last 24 Hours of Lab Results:
 Laboratory Tests
 
 
 03/22 03/21
 
 0400 2150
 
Chemistry  
 
  Sodium (137 - 145 mmol/L) 139 
 
  Potassium (3.5 - 5.1 mmol/L) 3.8 
 
  Chloride (98 - 107 mmol/L) 107 
 
  Carbon Dioxide (22 - 30 mmol/L) 18  L 
 
  Anion Gap (5 - 16) 14 
 
  BUN (7 - 17 mg/dL) 46  H 
 
  Creatinine (0.5 - 1.0 mg/dL) 1.5  H 
 
  Estimated GFR (>60 ml/min) 34  L 
 
  Glucose (65 - 99 mg/dL) 221  H 
 
  Calcium (8.4 - 10.2 mg/dL) 10.0 
 
  Phosphorus (2.5 - 4.5 mg/dL) 3.1 
 
  Magnesium (1.6 - 2.3 mg/dL) 1.6 
 
  Total Bilirubin (0.2 - 1.3 mg/dL) 0.9 
 
  AST (14 - 36 U/L) 15 
 
  ALT (9 - 52 U/L) 21 
 
  Albumin (3.5 - 5.0 g/dL) 2.7  L 
 
Hematology  
 
  CBC w Diff MAN DIFF ORDERED 
 
  WBC (4.8 - 10.8 /CUMM) 13.0  H 
 
  RBC (4.20 - 5.40 /CUMM) 3.42  L 
 
  Hgb (12.0 - 16.0 G/DL) 10.1  L 
 
  Hct (37 - 47 %) 29.8  L 
 
  MCV (81.0 - 99.0 FL) 87.1 
 
  MCH (27.0 - 31.0 PG) 29.6 
 
  MCHC (33.0 - 37.0 G/DL) 34.0 
 
  RDW (11.5 - 14.5 %) 15.3  H 
 
  Plt Count (130 - 400 /CUMM) 106  L 
 
  MPV (7.4 - 10.4 FL) 8.4 
 
  Gran % (42.2 - 75.2 %) 97.5  H 
 
  Lymphocytes % (20.5 - 51.1 %) 1.3  L 
 
  Monocytes % (1.7 - 9.3 %) 1.2  L 
 
  Eosinophils % (0 - 5 %) 0 
 
  Basophils % (0.0 - 2.0 %) 0 
 
  Absolute Granulocytes (1.4 - 6.5 /CUMM) 12.7  H 
 
  Segmented Neutrophils (42.2 - 75.2 %) 99  H 
 
  Absolute Lymphocytes (1.2 - 3.4 /CUMM) 0.2  L 
 
  Lymphocytes (20.5 - 51.1 %) 1  L 
 
  Absolute Monocytes (0.10 - 0.60 /CUMM) 0.2 
 
  Absolute Eosinophils (0.0 - 0.7 /CUMM) 0 
 
  Absolute Basophils (0.0 - 0.2 /CUMM) 0 
 
  Platelet Estimate (ADEQUATE) ADEQUATE 
 
  Polychromasia 1+ 
 
  Poikilocytosis 2+ 
 
  Ovalocytes 1+ 
 
  [place] Cells 1+ 
 
  Elliptocytes FEW 
 
Other Body Source  
 
  Fld Total RBCs Counted (%) 100 
 
Urines  
 
  Urinalysis  MOD  H
 
  Urine Color (YEL,AMB,STR)  YEL
 
  Urine Clarity (CLEAR)  CLDY  H
 
  Urine pH (5.0 - 8.0)  6.0
 
  Ur Specific Gravity (1.001 - 1.035)  1.025
 
  Urine Protein (NEG,<30 MG/DL)  100  H
 
  Urine Ketones (NEG)  TRACE  H
 
  Urine Nitrite (NEG)  NEG
 
  Urine Bilirubin (NEG)  NEG@ICTO
 
  Urine Urobilinogen (0.1  -  1.0 EU/dl)  0.2
 
  Ur Leukocyte Esterase (NEG)  MOD  H
 
  Ur Microscopic  SEDIMENT EXAMINED
 
  Urine RBC (0 - 5 /HPF)  1-3
 
  Urine WBC (0 - 2 /HPF)  50-75  H
 
  Ur Epithelial Cells (NONE,FEW)  FEW
 
  Urine Bacteria (NEG/NONE)  FEW  H
 
  Micro UA Comment  WAXY CASTS  H
 
  Urine Hemoglobin (NEG)  NEG
 
  Urine Glucose (N MG/DL)  NEG
 
 
 
 
 03/21 03/21
 
 1700 1630
 
Chemistry  
 
  Sodium (137 - 145 mmol/L) Cancelled 140
 
  Potassium (3.5 - 5.1 mmol/L) Cancelled 3.9
 
  Chloride (98 - 107 mmol/L) Cancelled 107
 
  Carbon Dioxide (22 - 30 mmol/L) Cancelled 18  L
 
  Anion Gap (5 - 16) Cancelled 15
 
  BUN (7 - 17 mg/dL) Cancelled 46  H
 
  Creatinine (0.5 - 1.0 mg/dL) Cancelled 1.7  H
 
  Estimated GFR (>60 ml/min)  29  L
 
  BUN/Creatinine Ratio (7 - 25 %) Cancelled 27.1  H
 
  Lactic Acid (0.7 - 2.1 mmol/L)  1.9
 
  Calcium (8.4 - 10.2 mg/dL)  10.2
 
  Phosphorus (2.5 - 4.5 mg/dL)  3.4
 
  Magnesium (1.6 - 2.3 mg/dL)  1.6
 
  Troponin I (< 0.11 ng/ml)  0.02
 
  Prolactin (3.0 - 18.6 ng/mL)  10.6
 
Coagulation  
 
  PT (9.4 - 12.5 SEC)  11.6
 
  INR (0.90 - 1.19)  1.06
 
  APTT (25 - 37 SEC)  29
 
Hematology  
 
  CBC w Diff  NO MAN DIFF REQ
 
  WBC (4.8 - 10.8 /CUMM)  14.2  H
 
  RBC (4.20 - 5.40 /CUMM)  3.29  L
 
  Hgb (12.0 - 16.0 G/DL)  9.8  L
 
  Hct (37 - 47 %)  28.8  L
 
  MCV (81.0 - 99.0 FL)  87.6
 
  MCH (27.0 - 31.0 PG)  29.9
 
  MCHC (33.0 - 37.0 G/DL)  34.1
 
  RDW (11.5 - 14.5 %)  15.4  H
 
  Plt Count (130 - 400 /CUMM)  133
 
  MPV (7.4 - 10.4 FL)  7.9
 
  Gran % (42.2 - 75.2 %)  91.8  H
 
  Lymphocytes % (20.5 - 51.1 %)  4.7  L
 
  Monocytes % (1.7 - 9.3 %)  3.5
 
  Eosinophils % (0 - 5 %)  0
 
  Basophils % (0.0 - 2.0 %)  0
 
  Absolute Granulocytes (1.4 - 6.5 /CUMM)  13.1  H
 
  Absolute Lymphocytes (1.2 - 3.4 /CUMM)  0.7  L
 
  Absolute Monocytes (0.10 - 0.60 /CUMM)  0.5
 
  Absolute Eosinophils (0.0 - 0.7 /CUMM)  0
 
  Absolute Basophils (0.0 - 0.2 /CUMM)  0
 
 
 
 
 03/21 03/21 03/21 03/21 03/21
 
 1600 1345 1300 1300 1046
 
Blood Gas     
 
  pH (7.35 - 7.45 PH) 7.31  L    
 
  pCO2 (35 - 45 TORR) 30  L    
 
  pO2 (80 - 100 TORR) 251  H    
 
  HCO3 (21 - 28 MEQ/L) 15  L    
 
  ABG O2 Sat (Measured) (>96.0 %) 98.0    
 
  P-50 (Temp Corrected) N    
 
  Carboxyhemoglobin (1.5 - 5.0 %) 0.3  L    
 
  O2 Concentration % 5L    
 
  Temperature (97.0 - 100.0 FARH) 97.9    
 
  O2 Delivery Method NC    
 
Chemistry     
 
  Lactic Acid (0.7 - 2.1 mmol/L)  Cancelled   1.3
 
Hematology     
 
  Lymphocytes (%)    24 
 
  % Normal PMNs (%)    32 
 
Miscellaneous     
 
  Phlebotomy Draw Site RIGHT RADIAL    
 
Other Body Source     
 
  Fluid WBC (0 - 5 /CUMM)   1287  H  
 
  Fld Mesothelial Cells (%)       
 
  Fld Total RBCs Counted (0 /CUMM)   98288  H  
 
  Fluid Total Protein (g/dL)    < 2.0 
 
  Fluid LDH (U/L)    237 
 
 
 
 
 03/21 03/21
 
 1040 0910
 
Chemistry  
 
  Sodium (137 - 145 mmol/L)  142
 
  Potassium (3.5 - 5.1 mmol/L)  4.5
 
  Chloride (98 - 107 mmol/L)  109  H
 
  Carbon Dioxide (22 - 30 mmol/L)  12  L
 
  Anion Gap (5 - 16)  21  H
 
  BUN (7 - 17 mg/dL)  46  H
 
  Creatinine (0.5 - 1.0 mg/dL)  1.6  H
 
  Estimated GFR (>60 ml/min)  31  L
 
  BUN/Creatinine Ratio (7 - 25 %)  28.8  H
 
  Total Bilirubin (0.2 - 1.3 mg/dL)  1.0
 
  Direct Bilirubin (< 0.4 mg/dL)  1.0  H
 
  AST (14 - 36 U/L)  18
 
  ALT (9 - 52 U/L)  22
 
  Alkaline Phosphatase (<127 U/L)  282  H
 
  Total Protein (6.3 - 8.2 g/dL)  5.3  L
 
  Albumin (3.5 - 5.0 g/dL)  2.8  L
 
Hematology  
 
  CBC w Diff  NO MAN DIFF REQ
 
  WBC (4.8 - 10.8 /CUMM)  13.5  H
 
  RBC (4.20 - 5.40 /CUMM)  3.63  L
 
  Hgb (12.0 - 16.0 G/DL)  10.7  L
 
  Hct (37 - 47 %)  31.6  L
 
  MCV (81.0 - 99.0 FL)  86.9
 
  MCH (27.0 - 31.0 PG)  29.4
 
  MCHC (33.0 - 37.0 G/DL)  33.8
 
  RDW (11.5 - 14.5 %)  15.5  H
 
  Plt Count (130 - 400 /CUMM)  125  L
 
  MPV (7.4 - 10.4 FL)  8.2
 
  Gran % (42.2 - 75.2 %)  94.4  H
 
  Lymphocytes % (20.5 - 51.1 %)  3.3  L
 
  Monocytes % (1.7 - 9.3 %)  2.3
 
  Eosinophils % (0 - 5 %)  0
 
  Basophils % (0.0 - 2.0 %)  0
 
  Absolute Granulocytes (1.4 - 6.5 /CUMM)  12.8  H
 
  Absolute Lymphocytes (1.2 - 3.4 /CUMM)  0.4  L
 
  Absolute Monocytes (0.10 - 0.60 /CUMM)  0.3
 
  Absolute Eosinophils (0.0 - 0.7 /CUMM)  0
 
  Absolute Basophils (0.0 - 0.2 /CUMM)  0
 
Urines  
 
  Urinalysis MOD  H 
 
  Urine Color (YEL,AMB,STR) YEL 
 
  Urine Clarity (CLEAR) HAZY  H 
 
  Urine pH (5.0 - 8.0) 6.0 
 
  Ur Specific Gravity (1.001 - 1.035) 1.020 
 
  Urine Protein (NEG,<30 MG/DL) 100  H 
 
  Urine Ketones (NEG) TRACE  H 
 
  Urine Nitrite (NEG) NEG 
 
  Urine Bilirubin (NEG) NEG@ICTO 
 
  Urine Urobilinogen (0.1  -  1.0 EU/dl) 0.2 
 
  Ur Leukocyte Esterase (NEG) LARGE  H 
 
  Ur Microscopic SEDIMENT EXAMINED 
 
  Urine RBC (0 - 5 /HPF) RARE 
 
  Urine WBC (0 - 2 /HPF) 25-50  H 
 
  Ur Epithelial Cells (NONE,FEW) FEW 
 
  Urine Bacteria (NEG/NONE) FEW  H 
 
  Granular Casts (NONE /LPF) RARE  H 
 
  Urine Hemoglobin (NEG) TRACE-INTACT 
 
  Urine Glucose (N MG/DL) NEG 
 
 
 
 
Assessment/Plan
Assessment/Recommendations:
Ms. Perez is a 77-year-old female with HTN, HLD, CKD stage 4, CAD s/p triple 
vessel CABG (over 10 years ago), sigmoid colon cancer s/p resections (in the 
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 
over 10 years ago and most recent angiography done in 2014, and chronic back 
pain who presented from [facility] for increasing ascites, worsening CKD, 
and recently noted masses on MRI. Since admission, she has had liver biopsy with
paracentesis.  Ascitic fluid was negative on 3/8/2018.  Preliminary of the liver
biopsy on 3/12/2018 is malignant.  AFP was normal.  CEA is elevated at 11.7.  
Differentials include colon, uterine, HCC, and hepatobiliary disease. HCC is 
less likely given normal AFP. Pathology result from [facility] 
suggest UGI and pancreatobiliary as potential etiologies.  Cholangiocarcinoma is
in the differential. 
 
Ideally, she would need upper endoscopy with ERCP for evaluation.  She should 
have CT of the chest with contrast to evaluate for metastatic disease.  
should be checked.  With her current clinical condition, she is not a candidate 
for therapy.  Her overall prognosis is poor. 
 
Metastatic carcinoma of likely GI/pancreatobililary origin:
-check CT chest
-check 
-discuss goals of care
-follow up as outpatient
-hospice candidate given current clinical condition
 
Please call [phone] with any questions or concerns.
Problem List:
 1. Carcinoma of liver
 
 2. FTT (failure to thrive) in adult

## 2018-03-23 VITALS — SYSTOLIC BLOOD PRESSURE: 144 MMHG | DIASTOLIC BLOOD PRESSURE: 80 MMHG

## 2018-03-23 NOTE — PN- HOSPICE
Subjective
Subjective:
Has not received any morphine or ativan, only scheduled phenobarb.  No seizure 
activity noted.  No oral intake, still making urine.  Slightly dyspneic, just 
repositioned in bed.
 
Review of Systems
Constitutional:
Reports: see HPI. 
 
Objective
Last 24 Hrs of Vital Signs/I&O
Vital Signs
 
 
Date Time Temp Pulse Resp B/P B/P Pulse O2 O2 Flow FiO2
 
     Mean Ox Delivery Rate 
 
03/23 0800       Room Air  
 
03/23 0637 97.2 101 20 144/80  93 Room Air  
 
03/23 0000       Room Air  
 
03/22 1911 97.6 105 20 140/80     
 
 
 Intake & Output
 
 
 03/23 1600 03/23 0800 03/23 0000
 
Intake Total   20
 
Output Total  150 100
 
Balance  -150 -80
 
    
 
Intake, IV   20
 
Intake, Oral   0
 
Output, Urine  150 100
 
 
 
 
Physical Exam
General Appearance: Thin, ill-appearing female, slightly dyspneic
Head: atraumatic
Ears, Nose, Throat: oral mucosa dry
Respiratory: increased effort, RR-28, lungs clear
Cardiovascular: tachycardia
Abdomen: soft, non-tender
Extremities: edema to right hand, bilat LEs, slight mottling right great toe
Current Medications:
 Current Medications
 
 
  Sig/Jose E Start time  Last
 
Medication Dose Route Stop Time Status Admin
 
Acetaminophen 650 MG Q4P PRN 03/22 1545 AC 
 
  SD   
 
Bisacodyl 10 MG DAILY AS NEEDED PRN 03/22 1545 AC 
 
  SD   
 
Clonidine 1 PAT Q168 03/22 1600 AC 03/22
 
  TOP   1911
 
Glycerin/Mineral Oil 1 SARAH Q8P PRN 03/22 1545 AC 03/22
 
  TOP   1912
 
Glycopyrrolate 400 MCG Q4P PRN 03/22 1545 AC 
 
  IV   
 
Lorazepam 0.5 MG Q4P PRN 03/22 1545 AC 
 
  IV   
 
Morphine Sulfate 2 MG Q2P PRN 03/22 1545 AC 
 
  IV   
 
Phenobarbital 60 MG Q8H 03/24 1700 AC 
 
  IV   
 
Phenobarbital 130 MG Q12H 03/23 0800 AC 03/23
 
  IV 03/24 0801  0909
 
Phenobarbital 130 MG Q12H 03/22 2000 DC 
 
  IV 03/23 2001  
 
Phenobarbital 130 MG Q12H 03/22 1545 DC 03/22
 
  IV 03/24 0346  1912
 
Phenobarbital 130 MG Q12H 03/22 0800 DC 
 
  IV 03/23 0801  
 
Scopolamine HBr 1 PAT Q72 PRN 03/22 1545 AC 
 
  TOP   
 
 
 
 
Assessment/Plan Hospice
Assessment/Recommendations:
77-year-old female with history of uterine and colon cancer in the 1970's, now 
with liver metastases from primary site likely GI in origin with seizure 
activity probably due to brain metastases.
 
dyspnea after repositioning.  Discussed with nursing if RR does not decrease 5-
10 min, give morphine.
Problem List:
 1. Metastasis to liver with unknown primary site
 
 2. Seizure
 
 3. Hospice care

## 2018-03-24 VITALS — SYSTOLIC BLOOD PRESSURE: 126 MMHG | DIASTOLIC BLOOD PRESSURE: 70 MMHG

## 2018-03-24 NOTE — PN- ATT ADDEND
Attending Addendum
Attending Brief Note
Patient seen and examined.  Plan of care discussed with the medical team and the
son was at the bedside.  Available lab work and radiology test reports were 
reviewed.  
 
Patient is comatose and does not respond to verbal or tactile stimulation
 
 Vital Signs
 
 
Date Time Temp Pulse Resp B/P B/P Pulse O2 O2 Flow FiO2
 
     Mean Ox Delivery Rate 
 
03/24 0620 98.9 101 16 126/70  93 Room Air  
 
 
 Intake & Output
 
 
 03/24 1600 03/24 0800 03/24 0000
 
Intake Total  0 0
 
Output Total  200 350
 
Balance  -200 -350
 
    
 
Intake, Oral  0 0
 
Output, Urine  200 350
 
 
 
Exam:
General: Patient  is comatose thout any distress 
CVS: S1 plus S2 without any murmur or gallops 
Chest: Few scattered crepitation without any wheeze.  There is no respiratory 
distress. 
Abdomen: Soft non-tender, bowel sound present, no guarding or rebound 
CNS: Patient comatose and does not respond
Extremities: No edema; no clubbing or cyanosis noted 
 
 
Assessment 
colon cancer in the 1970's, 
liver metastases from primary site likely GI in origin 
seizure activity
 
Plans to continue current management

## 2018-03-25 VITALS — SYSTOLIC BLOOD PRESSURE: 140 MMHG | DIASTOLIC BLOOD PRESSURE: 78 MMHG

## 2018-03-25 NOTE — PN- ATT ADDEND
Attending Addendum
Attending Brief Note
Patient seen and examined.  Plan of care discussed with the medical team and the
son was at the bedside.  Available lab work and radiology test reports were 
reviewed.  
 
Patient is comatose and does not respond to verbal or tactile stimulation.
 
Exam:
General: Patient  is comatose thout any distress 
CVS: S1 plus S2 without any murmur or gallops 
Chest: Few scattered crepitation without any wheeze.  There is no respiratory 
distress. 
Abdomen: Soft non-tender, bowel sound present, no guarding or rebound 
CNS: Patient comatose and does not respond
Extremities: No edema; no clubbing or cyanosis noted 
 
 
Assessment 
colon cancer in the 1970's, 
liver metastases from primary site likely GI in origin 
seizure activity
 
Plans to continue current management.  Overall patient is comfortable and does 
not require any adjustment of medications at this point.
 
Vital Signs
 
 
Date Time Temp Pulse Resp B/P B/P Pulse O2 O2 Flow FiO2
 
     Mean Ox Delivery Rate 
 
03/25 0620 98.0 90 18 140/78  92 Room Air

## 2018-03-26 VITALS — DIASTOLIC BLOOD PRESSURE: 90 MMHG | SYSTOLIC BLOOD PRESSURE: 170 MMHG

## 2018-03-26 NOTE — PN- HOSPICE
Subjective
Subjective:
Appears comfortable, unresponsive, received morphine this am.  Minimal urine 
output.
 
Review of Systems
Constitutional:
Reports: see HPI. 
 
Objective
Last 24 Hrs of Vital Signs/I&O
Vital Signs
 
 
Date Time Temp Pulse Resp B/P B/P Pulse O2 O2 Flow FiO2
 
     Mean Ox Delivery Rate 
 
03/26 0620 98.5 96 16 170/90  93 Room Air  
 
 
 Intake & Output
 
 
 03/26 1600 03/26 0800 03/26 0000
 
Intake Total  10 0
 
Output Total  100 300
 
Balance  -90 -300
 
    
 
Intake, IV  10 
 
Intake, Oral   0
 
Number  0 
 
Bowel   
 
Movements   
 
Output, Urine  100 300
 
 
 
 
Physical Exam
General Appearance: no apparent distress, sedated
Head: atraumatic
Ears, Nose, Throat: dry oral mucosa
Respiratory: no distress, no congestion
Cardiovascular: tachycardia
Extremities: mottling left foot
Current Medications:
 Current Medications
 
 
  Sig/Jose E Start time  Last
 
Medication Dose Route Stop Time Status Admin
 
Acetaminophen 650 MG Q4P PRN 03/22 1545 AC 
 
  DE   
 
Bisacodyl 10 MG DAILY AS NEEDED PRN 03/22 1545 AC 
 
  DE   
 
Clonidine 1 PAT Q168 03/22 1600 AC 03/22
 
  TOP   1911
 
Glycerin/Mineral Oil 1 SARAH Q8P PRN 03/22 1545 AC 03/22
 
  TOP   1912
 
Glycopyrrolate 400 MCG Q4P PRN 03/22 1545 AC 
 
  IV   
 
Lorazepam 0.5 MG Q4P PRN 03/22 1545 AC 03/25
 
  IV   1002
 
Morphine Sulfate 2 MG Q2P PRN 03/22 1545 AC 03/26
 
  IV   0834
 
Phenobarbital 60 MG Q8H 03/24 1700 AC 03/26
 
  IV   0834
 
Scopolamine HBr 1 PAT Q72 PRN 03/22 1545  
 
  TOP   
 
 
 
 
Assessment/Plan Hospice
Assessment/Recommendations:
77-year-old female with history of uterine and colon cancer in the 1970's, now 
with liver metastases from primary site likely GI in origin with seizure 
activity probably due to brain metastases.
 
Comfortable.  Continue as needed morphine and ativan.
Problem List:
 1. Metastasis to liver with unknown primary site
 
 2. Seizure
 
 3. Hospice care

## 2018-03-27 VITALS — SYSTOLIC BLOOD PRESSURE: 152 MMHG | DIASTOLIC BLOOD PRESSURE: 78 MMHG

## 2018-03-27 NOTE — PN- HOSPICE
Subjective
Subjective:
Pt. sedated, appears comfortable.  Nursing reports she was responsive earlier 
this am after am care. Able to answer that she was comfortable. She is also 
getting agitated with touching of IV or admission of medications through IV.  No
use of as needed medications today.
 
Review of Systems
Constitutional:
Reports: see HPI. 
 
Objective
Last 24 Hrs of Vital Signs/I&O
Vital Signs
 
 
Date Time Temp Pulse Resp B/P B/P Pulse O2 O2 Flow FiO2
 
     Mean Ox Delivery Rate 
 
03/27 0657 97.7 93 16 152/78  94   
 
 
 Intake & Output
 
 
 03/27 1600 03/27 0800 03/27 0000
 
Intake Total  0 0
 
Output Total   
 
Balance  0 0
 
    
 
Intake, Oral  0 0
 
 
 
 
Physical Exam
General Appearance: no apparent distress, sedated
Head: atraumatic
Ears, Nose, Throat: dry oral mucosa
Respiratory: no respiratory distress, no congestion, RR-16
Cardiovascular: regular rate/rhythm
Abdomen: soft, non-tender
Extremities: right 1st digit mottled
Other Physical Findings:
mccullough with small amount yellow urine
Current Medications:
 Current Medications
 
 
  Sig/Jose E Start time  Last
 
Medication Dose Route Stop Time Status Admin
 
Acetaminophen 650 MG Q4P PRN 03/22 1545 AC 
 
  TX   
 
Bisacodyl 10 MG DAILY AS NEEDED PRN 03/22 1545 AC 
 
  TX   
 
Clonidine 1 PAT Q168 03/22 1600  03/22
 
  TOP   1911
 
Glycerin/Mineral Oil 1 SARAH Q8P PRN 03/22 1545 AC 03/22
 
  TOP   1912
 
Glycopyrrolate 400 MCG Q4P PRN 03/22 1545 AC 
 
  IV   
 
Lorazepam 0.5 MG Q4P PRN 03/22 1545 AC 03/25
 
  IV   1002
 
Morphine Sulfate 2 MG Q2P PRN 03/22 1545 AC 03/26
 
  IV   1746
 
Phenobarbital 60 MG Q8H 03/24 1700 AC 03/27
 
  IV   0955
 
Scopolamine HBr 1 PAT Q72 PRN 03/22 1545  
 
  TOP   
 
 
 
 
Assessment/Plan Hospice
Assessment/Recommendations:
77-year-old female with history of uterine and colon cancer in the 1970's, now 
with liver metastases from primary site likely GI in origin with seizure 
activity probably due to brain metastases.
 
Comfortable, though IV bothering her.  Will change oral meds. Ativan and 
morphine changed to subcutaneous and phenobarb changed to rectal.
Problem List:
 1. Metastasis to liver with unknown primary site
 
 2. Seizure
 
 3. Hospice care

## 2018-03-28 VITALS — DIASTOLIC BLOOD PRESSURE: 76 MMHG | SYSTOLIC BLOOD PRESSURE: 160 MMHG

## 2018-03-28 NOTE — PN- HOSPICE
Subjective
Subjective:
Appears comfortable, minimally responsive.  Urine output very low.  Received 
morphine x 1, no ativan.  Nursing reports moaning with care.
 
Review of Systems
Constitutional:
Reports: see HPI. 
 
Objective
Last 24 Hrs of Vital Signs/I&O
Vital Signs
 
 
Date Time Temp Pulse Resp B/P B/P Pulse O2 O2 Flow FiO2
 
     Mean Ox Delivery Rate 
 
03/28 0635 98.4 90 16 160/76  94   
 
 
 Intake & Output
 
 
 03/28 1600 03/28 0800 03/28 0000
 
Intake Total  0 
 
Output Total  150 200
 
Balance  -150 -200
 
    
 
Intake, IV  0 
 
Intake, Oral  0 
 
Number  0 
 
Bowel   
 
Movements   
 
Output, Urine  150 200
 
 
 
 
Physical Exam
General Appearance: sedated
Head: temporal wasting
Ears, Nose, Throat: oral mucosa dry
Respiratory: no respiratory distress, no congestion
Cardiovascular: regular rate/rhythm
Extremities: mottling left great toe
Neurologic/Psychiatric: minimally responsive
Current Medications:
 Current Medications
 
 
  Sig/Jose E Start time  Last
 
Medication Dose Route Stop Time Status Admin
 
Acetaminophen 650 MG Q4P PRN 03/22 1545 AC 
 
  AL   
 
Bisacodyl 10 MG DAILY AS NEEDED PRN 03/22 1545 AC 
 
  AL   
 
Clonidine 1 PAT Q168 03/22 1600 AC 03/22
 
  TOP   1911
 
Glycerin/Mineral Oil 1 SARAH Q8P PRN 03/22 1545 AC 03/22
 
  TOP   1912
 
Glycopyrrolate 400 MCG Q4P PRN 03/22 1545 AC 
 
  IV   
 
Lorazepam 0.5 MG Q4P PRN 03/27 1400 AC 
 
  SC   
 
Lorazepam 0.5 MG Q4P PRN 03/22 1545 DC 03/25
 
  IV   1002
 
Morphine Sulfate 2 MG Q2P PRN 03/27 1400 AC 03/28
 
  SC   0919
 
Morphine Sulfate 2 MG Q2P PRN 03/22 1545 DC 03/26
 
  IV   1746
 
Phenobarbital 60 MG Q8H 03/27 1700 AC 03/28
 
  AL   0921
 
Phenobarbital 60 MG Q8H 03/24 1700 DC 03/27
 
  IV   0955
 
Scopolamine HBr 1 PAT Q72 PRN 03/22 1545  
 
  TOP   
 
 
 
 
Assessment/Plan Hospice
Assessment/Recommendations:
77-year-old female with history of uterine and colon cancer in the 1970's, now 
with liver metastases from primary site likely GI in origin with seizure 
activity probably due to brain metastases.
 
Uncomfortable with care--will schedule morphine and ativan q12hr, with care, and
continue as needed medication
Problem List:
 1. Metastasis to liver with unknown primary site
 
 2. Hospice care
 
 3. Seizure

## 2018-03-29 VITALS — SYSTOLIC BLOOD PRESSURE: 144 MMHG | DIASTOLIC BLOOD PRESSURE: 70 MMHG

## 2018-03-29 NOTE — DISCHARGE SUMMARY
Visit Information
 
Visit Dates
Admission Date:
18
 
Discharge Date:
18
 
 
Hospital Course
 
Course
Attending Physician:
James Camp MD
 
Primary Care Physician:
Adalberto De La O MD
 
Hospital Course:
77-year-old female with history of uterine and colon cancer in the 1970's, now 
with liver metastases from primary site likely GI in origin with seizure 
activity probably due to brain metastases. She was started on phenobarb for 
seizure activity and morphine and ativan were used for pain/dyspnea and anxiety.
Pt. was kept comfortable until she passed away peacefully this morning.  
Allergies:
Coded Allergies:
Sulfa (Sulfonamide Antibiotics) (Severe, HIVES 16)
 
 
Disposition Summary
 
Disposition
Principal Diagnosis:
Metastatic liver carcinoma, primary site unknown
Seizure disorder
Additional Diagnosis:
History of uterine cancer
History of colon cancer
Discharge Disposition: 
 
Discharge Instructions
 
General Discharge Information
Code Status: Hospice
Patient's Diet:
N/A
Patient's Activity:
N/A
Follow-Up Instructions/Appts:
N/A
Copies To:
Adalberto De La O MD

## 2020-11-11 NOTE — HISTORY & PHYSICAL
Awadalla MD,Jodi 01/29/18 1919:
General Information and HPI
MD Statement:
I have seen and personally examined BROOK QUIROZ and documented this H&P.
 
The patient is a 77 year old F who presented with a patient stated chief 
complaint of [back pain, lower extremity weakness].
 
Source of Information: patient, old records
History of Present Illness:
77-year-old female with past medical history of CAD status post CABG (2000), PVD
(status post angioplasty with stent placement), HTN,stage IV chronic kidney 
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 
uterine cancer, colon cancer, ? cva admitted with chief complaint of back pain 
for 2 weeks which is associated with lower extremity weakness and frequent 
falls.  She describes the pain as dull aching and radiating down her lower 
extremities especially on the left side.  Of note the patient has history of 
back injury in due to falling down and she has history of lumbar laminectomy.  
She also reports history of diarrhea for 2 months of associated with fecal 
incontinence.  Patient reports walking independently before the beginning of the
pain, and she lives home alone and was able to perform HER activities of daily 
living on her own.
 Patient denies any back injury, numbness, tingling, fever, chills
Nonsmoker, she quit smoking 40 years ago, denies alcohol or recreational drug 
use
 
 
Allergies/Medications
Allergies:
Coded Allergies:
Sulfa (Sulfonamide Antibiotics) (Severe, HIVES 11/20/16)
 
 
Past History
 
Travel History
Traveled to [place] past 21 day No
 
Medical History
Neurological: CVA (? L CVA w/o residual)
EENT: NONE
Cardiovascular: CAD, hypertension, hyperlipidemia, PVD, CABG 2000/PAD with LE 
stent
Respiratory: NONE
Gastrointestinal: n & v x 1 month PTA 10/22/1990:  remote sigmoid resection for 
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
Hepatic: NONE
Renal: chronic kidney disease (stage IV)
Musculoskeletal: chronic back pain, degen joint disease, falls, gout
Psychiatric: NONE
Endocrine: thyroid disease
Blood Disorders: anemia
Cancer(s): basal cell carcinoma, colon/rectal cancer, endometrial cancer
GYN/Reproductive: NONE
History of MRSA: No
History of VRE: No
History of CDIFF: No
 
Surgical History
Surgical History: hysterectomy (TAHBSO for uterine Ca, f/b RT ), PARTIAL 
COLECTOMY (SIGMOID) 10/1990  DUE TO CANCER OF COLON
 
Past Family/Social History
 
Family History
Relations & Conditions if any
MOTHER (Unknown - pt adolted).
FATHER (unknown - pt adopted).
 
 
Psychosocial History
Who Do You Live With? self
Services at Home: None
Primary Language: English
Living Will? yes
Power of /HCP? yes
Name of POA/HCP: Clare Alston- friend/POA
 
Functional Ability
ADLs
Independent: dressing, eating, toileting, bathing. 
Ambulation: cane
IADLs
Independent: shopping, housework, finances, food prep, telephone, transportation
, medication admin. 
 
Review of Systems
 
Review of Systems
Constitutional:
Reports: malaise, weakness.  Denies: chills, diaphoresis. 
Cardiovascular:
Denies: no symptoms. 
Respiratory:
Denies: no symptoms. 
GI:
Reports: bloating, diarrhea. 
Genitourinary:
Denies: no symptoms. 
Musculoskeletal:
Reports: back pain, muscle pain. 
Skin:
Denies: no symptoms. 
Neurological/Psychological:
Denies: no symptoms. 
Hematologic/Endocrine:
Denies: no symptoms. 
 
Exam & Diagnostic Data
Last 24 Hrs of Vital Signs/I&O
 Vital Signs
 
 
Date Time Temp Pulse Resp B/P B/P Pulse O2 O2 Flow FiO2
 
     Mean Ox Delivery Rate 
 
01/29 2024 98.7 69 20 170/60  97 Room Air  
 
01/29 1917 98.4 68 20 186/84  95 Room Air  
 
01/29 1717 98.1 71 18 191/84  96 Room Air  
 
01/29 1716       Room Air  
 
01/29 1455 98.1 80 18 190/86  98 Room Air  
 
 
 
 
Physical Exam
General Appearance Alert, Oriented X3, Cooperative, No Acute Distress
HEENT Atraumatic, PERRLA, EOMI, Mucous Membr. moist/pink
Neck Supple, No JVD
Cardiovascular Normal S1, Normal S2, ejection systolic murmur on aortic area
Lungs Clear to Auscultation
Abdomen Normal Bowel Sounds, Soft, distended, nontender, liver is palpable, 
irregular and firm
Neurological Normal Speech, strenghth is 3/5 in the LLE
Extremities RLE 2 + pitting edema
Vascular Normal Pulses
 
Assessment/Plan
Assessment:
77-year-old female with past medical history of CAD status post CABG (2000), PVD
(status post angioplasty with stent placement), HTN,stage IV chronic kidney 
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 
uterine cancer, colon cancer, ? cva admitted with chief complaint of back pain 
for 2 weeks which is associated with lower extremity weakness and frequent 
falls.
Vital signs on admission were insignificant except for blood pressure 190/86
Labs on admission: Significant for WBC 12, hemoglobin 10, creatinine 1.3, 
glucose 132,
CT abdomen: Small left pleural effusion, large lesion in the liver, degeneration
of the spine especially left-sided laminectomy L4 vertebra
 
 
#Back pain:
Most likely is due to degenerative joint disease versus metastasis, senses the 
patient has history of fecal incontinence and she has a history of colon cancer 
and uterine cancer however CT didn't show any signs of bone metastasis
Pain management with when necessary Vicodin, Percocet, Tylenol
PT evaluation
Obtain neurosurgical evaluation
 
#? RLE DVT:
Right lower extremity is more swollen than the left,
Follow up on Doppler ultrasound to rule out DVT
 
 
#Ascitis/liver mass
Most likely malignant ascites, liver mass was visualized in the previous CT, 
need to contact radiology for comparison of the size
Ultrasound-guided paracentesis
Follow-up on ascitic fluid tap pathology, glucose, LDH, pH
Follow up on culture and sensitivity,
Patient is likely will require MRI to identify the liver mass 
 
#Chronic medical conditions including hypertension,, CAD, hyperlipidemia
Continue home meds
 
 
Patient is full code
DVT prophylaxis with subcutaneous heparin
Heart healthy diet
 
 
 
As Ranked By This Provider
Problem List:
 1. Intractable back pain
 
 2. Liver lesion
 
 3. Ascites
 
 
Core Measures/Misc (9/17)
 
Acute Coronary Syndrome
ACS Diagnosis: No
 
Congestive Heart Failure
Congestive Heart Failure Diagnosis No
 
Cerebrovascular Accident
CVA/TIA Diagnosis: No
 
VTE (View Protocol)
VTE Risk Factors Age>40
No Mechanical VTE Prophylaxis d/t N/A MechProphylax Ordered
No VTE Pharm Prophylaxis d/t NA PharmProphylax ordered
 
Sepsis (View protocol)
Sepsis Present: No
 
RhonaSaAnson 01/29/18 1947:
General Information and HPI
 
Allergies/Medications
Home Med list
Amlodipine Besylate (Norvasc) 10 MG TABLET   1 TAB PO DAILY htn  (Reported)
Aspirin (Children's Aspirin) 81 MG TAB.CHEW   1 TAB PO DAILY HEART  (Reported)
Atorvastatin Calcium (Lipitor) 20 MG TABLET   1 TAB PO AT BEDTIME HIGH 
CHOLESTROL  (Reported)
Cholecalciferol (Vitamin D3) (Vitamin D3) 2,000 UNIT CAPSULE   1 CAP PO BID 
SUPPLEMENT  (Reported)
Cilostazol 50 MG TAB   1 TAB PO BID CLOTTING  (Reported)
Clonidine (Catapres) 0.2 MG TABLET   1 TAB PO BID HEART  (Reported)
Hydralazine HCl 50 MG TABLET   1 TAB PO BID htn  (Reported)
Isosorbide Mononitrate (Imdur) 60 MG TER   1 TAB PO DAILY HEART  (Reported)
Metoprolol Succinate 50 MG TAB.ER.24H   1 TAB PO DAILY htn  (Reported)
Omega-3 Fatty Acids/Fish Oil (Fish Oil 1,000 MG Capsule) 340 MG-1,000 MG CAPSULE
  2 CAP PO DAILY SUPPLEMENT  (Reported)
Omeprazole 40 MG CAPSULE.DR   1 CAP PO DAILY gerd
 
 
 
Resident Review Statement
Resident Statement: examined this patient, discussed with intern, agreed with 
intern, discussed with family, reviewed EMR data (avail), discussed with nursing
, discussed with case mgmt, reviewed images, amended to note
Other Findings:
This is 77-year-old female with medical history of CAD status post CABG (2000), 
PVD (status post angioplasty with stent placement), HTN, stage IV chronic kidney
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 
uterine cancer, colon cancer,  CVA.  Presented to the emergency department with 
a chief complain off back pain.  She stated that the back pain started 2 weeks 
ago and it is associated with lower extremity weakness and frequent falls.  She 
describes the pain as dull aching and radiating down her lower extremities 
especially on the left side. patient has history of back injury in due to 
falling down and she has history of lumbar laminectomy. the P.t stated that 
since last summer she started to noted her ABD size increased and that she 
wanted abdomen change.  Also she report  history of diarrhea for 2 months of 
associated with fecal incontinence before this back pain started.
 
In the ED patient did not receive any medication.  CT scan abdomen and pelvis 
without IV contrast reporting large.  Abdominal situs with generalized anasarca 
and small left pleural effusion.  Large lesion in the liver noted for. There is 
no signs of acute vertebral fracture.
 
Problem list:
-Lower back pain
-Generalized weakness
-Multiple fall no head injury
-Large abdominal ascites/anasarca
-Ventral hernia
-Chronic kidney disease stage IV
-Chronic anemia
 
Plan:
-Admit patient to general medicine floor
-Vitals every shift, Fall precaution
-Ultrasound-guided paracentesis in a.m. 
-Patient will need outpatient further assessment for possibility of underlying 
malignancy
-General surgery consultation for further assessment of the large abdominal wall
pocket of fluid and hernia.
-If patient spike a fever we'll start the patient IV ceftriaxone as prophylaxis 
we'll hold off antibiotic for now.
-Neurosurgery consultation and for further assessment
-Pain medication
-Physical therapy consultation in a.m.
-Obtain right lower extremity ultrasound to rule out DVT
-Check GGT to assess for elevated alkaline phosphatase
-Heart healthy diet
-DVT prophylaxis
-Full code
 
 
Jerson Mann MD 01/29/18 2242:
Attending MD Review Statement
 
Attending Statement
Attending MD Statement: examined this patient, discuss w/resident/PA/NP, agreed 
w/resident/PA/NP, reviewed EMR data (avail), reviewed images, amended to note
Attending Assessment/Plan:
The patient is a 78 yo female with h/o CAD (S/P CABG), PVD (S/P Stent), HTN, CKD
(IV) secondary to nephrosclerosis, HL, Uterine ca (treated with surgery 1980) 
and colon ca (also treated with surgical resection), chronic back pain (s/p 
laminectomy L4-5 in past) who presented in the ED with progressively increasing 
back pain (worse on left side) for 2 weeks. She has also noted increased 
abdominal girth since last summer and had seen her PCP (Dr. De La O). An US did 
show ascites and he had ordered a CT of the Abd/Pel, however the patient was 
unable to have it done due to back pain and difficultly ambulating. She was 
taking Tramadol at home w/o relief of pain. Denied any fevers, abdominal pain, 
cough, dyspnea, or other symptoms. Has had some LE weakness. She has had some 
falls in the past, however no specific injury triggering increased back pain. 
She had distant L4-5 lami (Dr. Rodriguez) in past and had done well with PT 
after her surgery. She describes several months of loose bowel movements/
diarrhea w/o blood or pain. 
 
Physical Exam:
VS: T 98.1, P 80, R 18, /86, PO 98% RA
HEENT: eyes- PERRLA, EOMI
           shamika- moist mucosa w/o lesions
Neck: supple, no adenopathy, thyromegaly, or bruits/JVD
Chest: clear except sl decreased breath sounds at bases
Cor: RRR nl S1, S2 w/+ 2/6 sys murm
Abd: BS+, soft, distended with ascites/shifting fluid wave, non-tender, + irreg 
liver/enlarged
Ext: RLE 2+ edema, non-tender/no cords, pulses 1+
Neuro: alert & oriented x 3, + diminished strength 3/5 LLE
 
Labs/Tests- as above
 
Impression/Plan:
#Intractable Back Pain- patient with h/o some degree of chronic back pain and 
did have prior disk surgery in distant past (Dr. Rodriguez) with success after
PT. Now with increased symptoms. CT showing neuroforaminal narrowing (no 
metastases or fractures) with spinal stenosis. Concern regarding some LLE 
weakness.
Plan: Admit to medical floor.
        Analgesia with Dilaudid/oxycodone/Tylenol.
        PT consult.
        Neurosurgical consult.
        May need STR.
 
#Abdominal Ascites/Liver Mass- prior CT of liver did show a small mass (?not 
worked up in past), now CT reports large mass. Radiology suggested dynamic 
contrast MRI, however patient is claustrophobic and the MRI scanner is down. 
Potential malignant ascites is of concern due to h/o ca in past (colon 1990). 
Does not appear to be SBP. The fact that dynamic MRI suggested may suggest 
radiology was considering hemangioma (?would not typically cause ascites). 
Plan: Diagnostic and therapeutic paracentesis tomorrow.
        Sent ascitic fluid for studies, including cytology. 
        Patient would prefer open MRI as OP.
        Check albumin, etc. 
       
#RLE Swelling/Edema- ? Has been ongoing.
Plan: Agree with Doppler evaluation for DVT. 
 
#HTN/CAD- normally on Amlodipine/Clonidine/Metoprolol/Isosorbide/Hydralazine.
Plan: Continue usual meds and monitor BP.
 
#HL- on Atorvastatin.
Plan: Hold Atorvastatin at present with abnormal liver. 
 
#GERD- on Omeprazole.
Plan: Continue Omeprazole.
 
#CKD4- has been stable.
Plan: Will follow. Avoid nephrotoxins. Cardiac Monitor/Defib/ACLS/Rescue Kit/O2/BVM